# Patient Record
Sex: FEMALE | Race: WHITE | Employment: OTHER | URBAN - NONMETROPOLITAN AREA
[De-identification: names, ages, dates, MRNs, and addresses within clinical notes are randomized per-mention and may not be internally consistent; named-entity substitution may affect disease eponyms.]

---

## 2017-04-13 ENCOUNTER — OFFICE VISIT (OUTPATIENT)
Dept: FAMILY MEDICINE CLINIC | Age: 72
End: 2017-04-13
Payer: MEDICARE

## 2017-04-13 VITALS
WEIGHT: 154 LBS | BODY MASS INDEX: 27.29 KG/M2 | TEMPERATURE: 98.8 F | SYSTOLIC BLOOD PRESSURE: 128 MMHG | HEART RATE: 110 BPM | DIASTOLIC BLOOD PRESSURE: 84 MMHG | OXYGEN SATURATION: 96 % | HEIGHT: 63 IN

## 2017-04-13 DIAGNOSIS — K57.30 DIVERTICULOSIS OF LARGE INTESTINE WITHOUT HEMORRHAGE: ICD-10-CM

## 2017-04-13 DIAGNOSIS — I10 ESSENTIAL HYPERTENSION: Primary | ICD-10-CM

## 2017-04-13 DIAGNOSIS — C50.911 MALIGNANT NEOPLASM OF RIGHT FEMALE BREAST, UNSPECIFIED SITE OF BREAST: ICD-10-CM

## 2017-04-13 DIAGNOSIS — Z23 NEED FOR PNEUMOCOCCAL VACCINATION: ICD-10-CM

## 2017-04-13 DIAGNOSIS — R73.09 ELEVATED GLUCOSE: ICD-10-CM

## 2017-04-13 PROCEDURE — 99203 OFFICE O/P NEW LOW 30 MIN: CPT | Performed by: NURSE PRACTITIONER

## 2017-04-13 PROCEDURE — 3014F SCREEN MAMMO DOC REV: CPT | Performed by: NURSE PRACTITIONER

## 2017-04-13 PROCEDURE — 4040F PNEUMOC VAC/ADMIN/RCVD: CPT | Performed by: NURSE PRACTITIONER

## 2017-04-13 PROCEDURE — 1123F ACP DISCUSS/DSCN MKR DOCD: CPT | Performed by: NURSE PRACTITIONER

## 2017-04-13 PROCEDURE — 3017F COLORECTAL CA SCREEN DOC REV: CPT | Performed by: NURSE PRACTITIONER

## 2017-04-13 PROCEDURE — 1090F PRES/ABSN URINE INCON ASSESS: CPT | Performed by: NURSE PRACTITIONER

## 2017-04-13 PROCEDURE — G8427 DOCREV CUR MEDS BY ELIG CLIN: HCPCS | Performed by: NURSE PRACTITIONER

## 2017-04-13 PROCEDURE — G8420 CALC BMI NORM PARAMETERS: HCPCS | Performed by: NURSE PRACTITIONER

## 2017-04-13 PROCEDURE — G8399 PT W/DXA RESULTS DOCUMENT: HCPCS | Performed by: NURSE PRACTITIONER

## 2017-04-13 PROCEDURE — 1036F TOBACCO NON-USER: CPT | Performed by: NURSE PRACTITIONER

## 2017-04-13 RX ORDER — RALOXIFENE HYDROCHLORIDE 60 MG/1
60 TABLET, FILM COATED ORAL DAILY
COMMUNITY
End: 2017-04-13 | Stop reason: SDUPTHER

## 2017-04-13 RX ORDER — FUROSEMIDE 20 MG/1
20 TABLET ORAL DAILY
COMMUNITY
End: 2017-04-13 | Stop reason: SDUPTHER

## 2017-04-13 RX ORDER — KETOPROFEN 200 MG
200 CAPSULE, EXTENDED RELEASE PELLETS 24 HR ORAL PRN
COMMUNITY

## 2017-04-13 RX ORDER — LISINOPRIL 5 MG/1
5 TABLET ORAL 2 TIMES DAILY
Qty: 180 TABLET | Refills: 1 | Status: SHIPPED | OUTPATIENT
Start: 2017-04-13 | End: 2017-10-26 | Stop reason: SDUPTHER

## 2017-04-13 RX ORDER — FUROSEMIDE 20 MG/1
20 TABLET ORAL DAILY
Qty: 90 TABLET | Refills: 1 | Status: SHIPPED | OUTPATIENT
Start: 2017-04-13 | End: 2017-10-26 | Stop reason: SDUPTHER

## 2017-04-13 RX ORDER — LISINOPRIL 5 MG/1
5 TABLET ORAL 2 TIMES DAILY
COMMUNITY
End: 2017-04-13 | Stop reason: SDUPTHER

## 2017-04-13 RX ORDER — RALOXIFENE HYDROCHLORIDE 60 MG/1
60 TABLET, FILM COATED ORAL DAILY
Qty: 90 TABLET | Refills: 1 | Status: SHIPPED | OUTPATIENT
Start: 2017-04-13 | End: 2017-10-26 | Stop reason: SDUPTHER

## 2017-04-13 ASSESSMENT — ENCOUNTER SYMPTOMS
DIARRHEA: 0
ABDOMINAL PAIN: 0
SHORTNESS OF BREATH: 0
CONSTIPATION: 0
TROUBLE SWALLOWING: 0

## 2017-05-08 ENCOUNTER — HOSPITAL ENCOUNTER (OUTPATIENT)
Dept: WOMENS IMAGING | Age: 72
Discharge: HOME | End: 2017-05-08
Payer: MEDICARE

## 2017-05-08 DIAGNOSIS — Z12.31 VISIT FOR SCREENING MAMMOGRAM: ICD-10-CM

## 2017-05-08 PROCEDURE — 77063 BREAST TOMOSYNTHESIS BI: CPT

## 2017-05-12 ENCOUNTER — HOSPITAL ENCOUNTER (OUTPATIENT)
Dept: ULTRASOUND IMAGING | Age: 72
Discharge: HOME | End: 2017-05-12
Payer: MEDICARE

## 2017-05-12 ENCOUNTER — HOSPITAL ENCOUNTER (OUTPATIENT)
Dept: WOMENS IMAGING | Age: 72
End: 2017-05-12
Payer: MEDICARE

## 2017-05-12 DIAGNOSIS — Z85.3 PERSONAL HISTORY OF BREAST CANCER: ICD-10-CM

## 2017-05-12 DIAGNOSIS — C50.511 MALIGNANT NEOPLASM OF LOWER-OUTER QUADRANT OF RIGHT FEMALE BREAST (HCC): ICD-10-CM

## 2017-05-12 PROCEDURE — 76642 ULTRASOUND BREAST LIMITED: CPT

## 2017-06-01 ENCOUNTER — HOSPITAL ENCOUNTER (OUTPATIENT)
Dept: MRI IMAGING | Age: 72
Discharge: HOME | End: 2017-06-01
Payer: MEDICARE

## 2017-06-01 DIAGNOSIS — R93.89 ABNORMAL ULTRASOUND: ICD-10-CM

## 2017-06-01 LAB
GFR NON-AFRICAN AMERICAN: >60
PERFORMED ON: NORMAL
POC CREATININE: 0.6 MG/DL (ref 0.3–1.3)
POC SAMPLE TYPE: NORMAL

## 2017-06-01 PROCEDURE — 6360000004 HC RX CONTRAST MEDICATION: Performed by: INTERNAL MEDICINE

## 2017-06-01 PROCEDURE — C8908 MRI W/O FOL W/CONT, BREAST,: HCPCS

## 2017-06-01 PROCEDURE — 82565 ASSAY OF CREATININE: CPT

## 2017-06-01 PROCEDURE — A9579 GAD-BASE MR CONTRAST NOS,1ML: HCPCS | Performed by: INTERNAL MEDICINE

## 2017-06-01 RX ADMIN — GADOPENTETATE DIMEGLUMINE 20 ML: 469.01 INJECTION INTRAVENOUS at 15:02

## 2017-07-06 ENCOUNTER — HOSPITAL ENCOUNTER (OUTPATIENT)
Dept: ULTRASOUND IMAGING | Age: 72
Discharge: HOME | End: 2017-07-06
Payer: MEDICARE

## 2017-07-06 DIAGNOSIS — K76.89 OTHER CHRONIC NONALCOHOLIC LIVER DISEASE: ICD-10-CM

## 2017-07-06 DIAGNOSIS — R73.09 ELEVATED GLUCOSE: ICD-10-CM

## 2017-07-06 DIAGNOSIS — I10 ESSENTIAL HYPERTENSION: ICD-10-CM

## 2017-07-06 LAB
ALBUMIN SERPL-MCNC: 4.1 G/DL (ref 3.5–5.2)
ALP BLD-CCNC: 71 U/L (ref 35–104)
ALT SERPL-CCNC: 16 U/L (ref 5–33)
ANION GAP SERPL CALCULATED.3IONS-SCNC: 19 MMOL/L (ref 7–19)
AST SERPL-CCNC: 18 U/L (ref 5–32)
BACTERIA: NEGATIVE /HPF
BASOPHILS ABSOLUTE: 0.1 K/UL (ref 0–0.2)
BASOPHILS RELATIVE PERCENT: 1.1 % (ref 0–1)
BILIRUB SERPL-MCNC: 0.5 MG/DL (ref 0.2–1.2)
BILIRUBIN URINE: NEGATIVE
BLOOD, URINE: NEGATIVE
BUN BLDV-MCNC: 18 MG/DL (ref 8–23)
CALCIUM SERPL-MCNC: 9.3 MG/DL (ref 8.8–10.2)
CHLORIDE BLD-SCNC: 96 MMOL/L (ref 98–111)
CHOLESTEROL, TOTAL: 200 MG/DL (ref 160–199)
CLARITY: CLEAR
CO2: 24 MMOL/L (ref 22–29)
COLOR: YELLOW
CREAT SERPL-MCNC: 0.6 MG/DL (ref 0.5–0.9)
CREATININE URINE: 17.8 MG/DL (ref 4.2–622)
EOSINOPHILS ABSOLUTE: 0.2 K/UL (ref 0–0.6)
EOSINOPHILS RELATIVE PERCENT: 3.6 % (ref 0–5)
EPITHELIAL CELLS, UA: 1 /HPF (ref 0–5)
GFR NON-AFRICAN AMERICAN: >60
GLUCOSE BLD-MCNC: 324 MG/DL (ref 74–109)
GLUCOSE URINE: >=1000 MG/DL
HBA1C MFR BLD: 11.9 %
HCT VFR BLD CALC: 46.4 % (ref 37–47)
HDLC SERPL-MCNC: 60 MG/DL (ref 65–121)
HEMOGLOBIN: 15.4 G/DL (ref 12–16)
HYALINE CASTS: 1 /HPF (ref 0–8)
KETONES, URINE: NEGATIVE MG/DL
LDL CHOLESTEROL CALCULATED: 105 MG/DL
LEUKOCYTE ESTERASE, URINE: ABNORMAL
LYMPHOCYTES ABSOLUTE: 1.8 K/UL (ref 1.1–4.5)
LYMPHOCYTES RELATIVE PERCENT: 29.3 % (ref 20–40)
MCH RBC QN AUTO: 32.6 PG (ref 27–31)
MCHC RBC AUTO-ENTMCNC: 33.2 G/DL (ref 33–37)
MCV RBC AUTO: 98.1 FL (ref 81–99)
MICROALBUMIN UR-MCNC: <1.2 MG/DL (ref 0–19)
MICROALBUMIN/CREAT UR-RTO: NORMAL MG/G
MONOCYTES ABSOLUTE: 0.5 K/UL (ref 0–0.9)
MONOCYTES RELATIVE PERCENT: 7.4 % (ref 0–10)
NEUTROPHILS ABSOLUTE: 3.6 K/UL (ref 1.5–7.5)
NEUTROPHILS RELATIVE PERCENT: 58.4 % (ref 50–65)
NITRITE, URINE: NEGATIVE
PDW BLD-RTO: 12.2 % (ref 11.5–14.5)
PH UA: 5
PLATELET # BLD: 227 K/UL (ref 130–400)
PMV BLD AUTO: 9.7 FL (ref 9.4–12.3)
POTASSIUM SERPL-SCNC: 4.2 MMOL/L (ref 3.5–5)
PROTEIN UA: NEGATIVE MG/DL
RBC # BLD: 4.73 M/UL (ref 4.2–5.4)
RBC UA: 0 /HPF (ref 0–4)
SODIUM BLD-SCNC: 139 MMOL/L (ref 136–145)
SPECIFIC GRAVITY UA: 1.01
TOTAL PROTEIN: 8 G/DL (ref 6.6–8.7)
TRIGL SERPL-MCNC: 177 MG/DL (ref 150–199)
UROBILINOGEN, URINE: 0.2 E.U./DL
WBC # BLD: 6.1 K/UL (ref 4.8–10.8)
WBC UA: 6 /HPF (ref 0–5)

## 2017-07-06 PROCEDURE — 76705 ECHO EXAM OF ABDOMEN: CPT

## 2017-07-08 LAB — URINE CULTURE, ROUTINE: NORMAL

## 2017-07-10 ENCOUNTER — HOSPITAL ENCOUNTER (OUTPATIENT)
Dept: CT IMAGING | Age: 72
Discharge: HOME | End: 2017-07-10
Payer: MEDICARE

## 2017-07-10 DIAGNOSIS — R16.0 HYPODENSE MASS OF RIGHT LOBE OF LIVER: ICD-10-CM

## 2017-07-10 PROCEDURE — 74160 CT ABDOMEN W/CONTRAST: CPT

## 2017-07-10 PROCEDURE — 6360000004 HC RX CONTRAST MEDICATION: Performed by: INTERNAL MEDICINE

## 2017-07-10 RX ADMIN — IOVERSOL 90 ML: 741 INJECTION INTRA-ARTERIAL; INTRAVENOUS at 10:26

## 2017-07-11 ENCOUNTER — OFFICE VISIT (OUTPATIENT)
Dept: FAMILY MEDICINE CLINIC | Age: 72
End: 2017-07-11
Payer: MEDICARE

## 2017-07-11 VITALS
WEIGHT: 153 LBS | SYSTOLIC BLOOD PRESSURE: 120 MMHG | BODY MASS INDEX: 27.1 KG/M2 | TEMPERATURE: 98.7 F | HEART RATE: 100 BPM | DIASTOLIC BLOOD PRESSURE: 76 MMHG | OXYGEN SATURATION: 97 %

## 2017-07-11 DIAGNOSIS — E11.9 TYPE 2 DIABETES MELLITUS WITHOUT COMPLICATION, WITHOUT LONG-TERM CURRENT USE OF INSULIN (HCC): Primary | ICD-10-CM

## 2017-07-11 PROCEDURE — 3046F HEMOGLOBIN A1C LEVEL >9.0%: CPT | Performed by: NURSE PRACTITIONER

## 2017-07-11 PROCEDURE — 3017F COLORECTAL CA SCREEN DOC REV: CPT | Performed by: NURSE PRACTITIONER

## 2017-07-11 PROCEDURE — G8399 PT W/DXA RESULTS DOCUMENT: HCPCS | Performed by: NURSE PRACTITIONER

## 2017-07-11 PROCEDURE — 1090F PRES/ABSN URINE INCON ASSESS: CPT | Performed by: NURSE PRACTITIONER

## 2017-07-11 PROCEDURE — 3014F SCREEN MAMMO DOC REV: CPT | Performed by: NURSE PRACTITIONER

## 2017-07-11 PROCEDURE — 99213 OFFICE O/P EST LOW 20 MIN: CPT | Performed by: NURSE PRACTITIONER

## 2017-07-11 PROCEDURE — 1036F TOBACCO NON-USER: CPT | Performed by: NURSE PRACTITIONER

## 2017-07-11 PROCEDURE — G8419 CALC BMI OUT NRM PARAM NOF/U: HCPCS | Performed by: NURSE PRACTITIONER

## 2017-07-11 PROCEDURE — G8427 DOCREV CUR MEDS BY ELIG CLIN: HCPCS | Performed by: NURSE PRACTITIONER

## 2017-07-11 PROCEDURE — 1123F ACP DISCUSS/DSCN MKR DOCD: CPT | Performed by: NURSE PRACTITIONER

## 2017-07-11 PROCEDURE — 4040F PNEUMOC VAC/ADMIN/RCVD: CPT | Performed by: NURSE PRACTITIONER

## 2017-07-11 RX ORDER — LANCETS 30 GAUGE
EACH MISCELLANEOUS
Qty: 100 EACH | Refills: 5 | Status: SHIPPED | OUTPATIENT
Start: 2017-07-11

## 2017-07-11 RX ORDER — GLIMEPIRIDE 1 MG/1
1 TABLET ORAL
Qty: 30 TABLET | Refills: 3 | Status: SHIPPED | OUTPATIENT
Start: 2017-07-11 | End: 2017-10-26 | Stop reason: DRUGHIGH

## 2017-07-18 ENCOUNTER — TELEPHONE (OUTPATIENT)
Dept: FAMILY MEDICINE CLINIC | Age: 72
End: 2017-07-18

## 2017-10-26 ENCOUNTER — OFFICE VISIT (OUTPATIENT)
Dept: FAMILY MEDICINE CLINIC | Age: 72
End: 2017-10-26
Payer: MEDICARE

## 2017-10-26 VITALS
WEIGHT: 160 LBS | TEMPERATURE: 98 F | BODY MASS INDEX: 28.34 KG/M2 | OXYGEN SATURATION: 93 % | RESPIRATION RATE: 20 BRPM | HEART RATE: 111 BPM | DIASTOLIC BLOOD PRESSURE: 84 MMHG | SYSTOLIC BLOOD PRESSURE: 122 MMHG

## 2017-10-26 DIAGNOSIS — C50.911 MALIGNANT NEOPLASM OF RIGHT FEMALE BREAST, UNSPECIFIED ESTROGEN RECEPTOR STATUS, UNSPECIFIED SITE OF BREAST (HCC): Primary | ICD-10-CM

## 2017-10-26 DIAGNOSIS — I10 ESSENTIAL HYPERTENSION: ICD-10-CM

## 2017-10-26 DIAGNOSIS — E11.9 TYPE 2 DIABETES MELLITUS WITHOUT COMPLICATION, WITHOUT LONG-TERM CURRENT USE OF INSULIN (HCC): ICD-10-CM

## 2017-10-26 PROCEDURE — G8399 PT W/DXA RESULTS DOCUMENT: HCPCS | Performed by: NURSE PRACTITIONER

## 2017-10-26 PROCEDURE — G8484 FLU IMMUNIZE NO ADMIN: HCPCS | Performed by: NURSE PRACTITIONER

## 2017-10-26 PROCEDURE — 1090F PRES/ABSN URINE INCON ASSESS: CPT | Performed by: NURSE PRACTITIONER

## 2017-10-26 PROCEDURE — 99214 OFFICE O/P EST MOD 30 MIN: CPT | Performed by: NURSE PRACTITIONER

## 2017-10-26 PROCEDURE — G8427 DOCREV CUR MEDS BY ELIG CLIN: HCPCS | Performed by: NURSE PRACTITIONER

## 2017-10-26 PROCEDURE — 1123F ACP DISCUSS/DSCN MKR DOCD: CPT | Performed by: NURSE PRACTITIONER

## 2017-10-26 PROCEDURE — 1036F TOBACCO NON-USER: CPT | Performed by: NURSE PRACTITIONER

## 2017-10-26 PROCEDURE — 4040F PNEUMOC VAC/ADMIN/RCVD: CPT | Performed by: NURSE PRACTITIONER

## 2017-10-26 PROCEDURE — 3017F COLORECTAL CA SCREEN DOC REV: CPT | Performed by: NURSE PRACTITIONER

## 2017-10-26 PROCEDURE — 3046F HEMOGLOBIN A1C LEVEL >9.0%: CPT | Performed by: NURSE PRACTITIONER

## 2017-10-26 PROCEDURE — G8419 CALC BMI OUT NRM PARAM NOF/U: HCPCS | Performed by: NURSE PRACTITIONER

## 2017-10-26 PROCEDURE — 3014F SCREEN MAMMO DOC REV: CPT | Performed by: NURSE PRACTITIONER

## 2017-10-26 RX ORDER — RALOXIFENE HYDROCHLORIDE 60 MG/1
60 TABLET, FILM COATED ORAL DAILY
Qty: 90 TABLET | Refills: 1 | Status: SHIPPED | OUTPATIENT
Start: 2017-10-26 | End: 2018-04-16 | Stop reason: SDUPTHER

## 2017-10-26 RX ORDER — GLIMEPIRIDE 2 MG/1
2 TABLET ORAL
Qty: 30 TABLET | Refills: 5 | Status: SHIPPED | OUTPATIENT
Start: 2017-10-26 | End: 2017-10-26 | Stop reason: SINTOL

## 2017-10-26 RX ORDER — METFORMIN HYDROCHLORIDE 750 MG/1
750 TABLET, EXTENDED RELEASE ORAL
Qty: 30 TABLET | Refills: 5 | Status: SHIPPED | OUTPATIENT
Start: 2017-10-26 | End: 2018-04-19

## 2017-10-26 RX ORDER — LISINOPRIL 5 MG/1
5 TABLET ORAL 2 TIMES DAILY
Qty: 180 TABLET | Refills: 1 | Status: SHIPPED | OUTPATIENT
Start: 2017-10-26 | End: 2018-04-16 | Stop reason: SDUPTHER

## 2017-10-26 RX ORDER — FUROSEMIDE 20 MG/1
20 TABLET ORAL DAILY
Qty: 90 TABLET | Refills: 1 | Status: SHIPPED | OUTPATIENT
Start: 2017-10-26 | End: 2018-04-16 | Stop reason: SDUPTHER

## 2017-10-26 ASSESSMENT — ENCOUNTER SYMPTOMS
DIARRHEA: 1
TROUBLE SWALLOWING: 0
ABDOMINAL PAIN: 1
NAUSEA: 1
SHORTNESS OF BREATH: 0

## 2017-10-26 NOTE — PROGRESS NOTES
[address]                  [address]                 07/06/2017                . Chief Complaint   Patient presents with    Diabetes     3 month check up for diabetic , needs refills on prescriptions, Metformin is making her sick to her stomach          Review of Systems   Constitutional: Positive for diaphoresis. Negative for unexpected weight change. HENT: Negative for trouble swallowing. Respiratory: Negative for shortness of breath. Cardiovascular: Negative for chest pain. Gastrointestinal: Positive for abdominal pain, diarrhea and nausea. Musculoskeletal: Negative for arthralgias and myalgias. Past Medical History:   Diagnosis Date    Cancer [facility])     breast right breast    Hypertension     Osteoarthritis      Current Outpatient Prescriptions on File Prior to Visit   Medication Sig Dispense Refill    UNABLE TO FIND E11.9  glucomter Device  Insurance preference 1 Device 0    Lancets MISC 2 times a day 100 each 5    UNABLE TO FIND Test strips insurance preferred  2 times a day FBS and 2 hour after meal 100 strip 5    metFORMIN (GLUCOPHAGE) 500 MG tablet Take 1 tablet by mouth 2 times daily (with meals) 60 tablet 3    glimepiride (AMARYL) 1 MG tablet Take 1 tablet by mouth every morning (before breakfast) 30 tablet 3    aspirin 81 MG tablet Take 81 mg by mouth daily      Calcium Carbonate-Vitamin D (CALCIUM-VITAMIN D) 500-200 MG-UNIT per tablet Take 1 tablet by mouth 2 times daily (with meals)      Ketoprofen  MG CP24 Take 200 mg by mouth as needed      raloxifene (EVISTA) 60 MG tablet Take 1 tablet by mouth daily 90 tablet 1    lisinopril (PRINIVIL;ZESTRIL) 5 MG tablet Take 1 tablet by mouth 2 times daily 180 tablet 1    furosemide (LASIX) 20 MG tablet Take 1 tablet by mouth daily 90 tablet 1     No current facility-administered medications on file prior to visit.       No Known Allergies    Objective:   Physical Exam   Constitutional: She is oriented to person, place, and time. She appears well-developed and well-nourished. HENT:   Head: Normocephalic and atraumatic. Right Ear: External ear normal.   Left Ear: External ear normal.   Eyes: Conjunctivae and lids are normal.   Neck: Normal range of motion. Cardiovascular: Normal rate, regular rhythm and normal heart sounds. Pulmonary/Chest: Effort normal and breath sounds normal.   Abdominal: Normal appearance. Neurological: She is alert and oriented to person, place, and time. Skin: Skin is warm and dry. Psychiatric: She has a normal mood and affect. Her behavior is normal.     /84 (Site: Left Arm, Position: Sitting, Cuff Size: Large Adult)   Pulse 111   Temp 98 °F (36.7 °C) (Oral)   Resp 20   Wt 160 lb (72.6 kg)   SpO2 93%   BMI 28.34 kg/m²     Assessment:      1. Malignant neoplasm of right female breast, unspecified estrogen receptor status, unspecified site of breast ([facility]  raloxifene (EVISTA) 60 MG tablet   2. Type 2 diabetes mellitus without complication, without long-term current use of insulin (HCC)  metFORMIN (GLUCOPHAGE-XR) 750 MG extended release tablet    linagliptin (TRADJENTA) 5 MG tablet    Hemoglobin A1C    DISCONTINUED: glimepiride (AMARYL) 2 MG tablet   3. Essential hypertension  lisinopril (PRINIVIL;ZESTRIL) 5 MG tablet    furosemide (LASIX) 20 MG tablet    Lipid Panel           Plan:      Change med's due to side effects. Of Metformin made ER and Amaryl to [facility]   Review labs.

## 2017-11-09 DIAGNOSIS — E11.9 TYPE 2 DIABETES MELLITUS WITHOUT COMPLICATION, WITHOUT LONG-TERM CURRENT USE OF INSULIN (HCC): ICD-10-CM

## 2017-11-09 DIAGNOSIS — I10 ESSENTIAL HYPERTENSION: ICD-10-CM

## 2017-11-09 LAB
CHOLESTEROL, TOTAL: 173 MG/DL (ref 160–199)
HBA1C MFR BLD: 7.3 %
HDLC SERPL-MCNC: 61 MG/DL (ref 65–121)
LDL CHOLESTEROL CALCULATED: 97 MG/DL
TRIGL SERPL-MCNC: 75 MG/DL (ref 0–149)

## 2018-04-16 ENCOUNTER — OFFICE VISIT (OUTPATIENT)
Dept: FAMILY MEDICINE CLINIC | Age: 73
End: 2018-04-16
Payer: MEDICARE

## 2018-04-16 VITALS
RESPIRATION RATE: 20 BRPM | DIASTOLIC BLOOD PRESSURE: 82 MMHG | SYSTOLIC BLOOD PRESSURE: 124 MMHG | HEIGHT: 63 IN | BODY MASS INDEX: 28.17 KG/M2 | HEART RATE: 98 BPM | TEMPERATURE: 98 F | WEIGHT: 159 LBS | OXYGEN SATURATION: 98 %

## 2018-04-16 DIAGNOSIS — E11.9 TYPE 2 DIABETES MELLITUS WITHOUT COMPLICATION, WITHOUT LONG-TERM CURRENT USE OF INSULIN (HCC): Primary | ICD-10-CM

## 2018-04-16 DIAGNOSIS — B35.1 ONYCHOMYCOSIS: ICD-10-CM

## 2018-04-16 DIAGNOSIS — C50.911 MALIGNANT NEOPLASM OF RIGHT FEMALE BREAST, UNSPECIFIED ESTROGEN RECEPTOR STATUS, UNSPECIFIED SITE OF BREAST (HCC): ICD-10-CM

## 2018-04-16 DIAGNOSIS — I10 ESSENTIAL HYPERTENSION: ICD-10-CM

## 2018-04-16 DIAGNOSIS — L03.011 ONYCHIA OF FINGER OF RIGHT HAND: ICD-10-CM

## 2018-04-16 PROCEDURE — 3017F COLORECTAL CA SCREEN DOC REV: CPT | Performed by: NURSE PRACTITIONER

## 2018-04-16 PROCEDURE — 3014F SCREEN MAMMO DOC REV: CPT | Performed by: NURSE PRACTITIONER

## 2018-04-16 PROCEDURE — 3046F HEMOGLOBIN A1C LEVEL >9.0%: CPT | Performed by: NURSE PRACTITIONER

## 2018-04-16 PROCEDURE — 1090F PRES/ABSN URINE INCON ASSESS: CPT | Performed by: NURSE PRACTITIONER

## 2018-04-16 PROCEDURE — 4040F PNEUMOC VAC/ADMIN/RCVD: CPT | Performed by: NURSE PRACTITIONER

## 2018-04-16 PROCEDURE — 1036F TOBACCO NON-USER: CPT | Performed by: NURSE PRACTITIONER

## 2018-04-16 PROCEDURE — 1123F ACP DISCUSS/DSCN MKR DOCD: CPT | Performed by: NURSE PRACTITIONER

## 2018-04-16 PROCEDURE — 99214 OFFICE O/P EST MOD 30 MIN: CPT | Performed by: NURSE PRACTITIONER

## 2018-04-16 PROCEDURE — G8399 PT W/DXA RESULTS DOCUMENT: HCPCS | Performed by: NURSE PRACTITIONER

## 2018-04-16 PROCEDURE — G8419 CALC BMI OUT NRM PARAM NOF/U: HCPCS | Performed by: NURSE PRACTITIONER

## 2018-04-16 PROCEDURE — 2022F DILAT RTA XM EVC RTNOPTHY: CPT | Performed by: NURSE PRACTITIONER

## 2018-04-16 PROCEDURE — G8427 DOCREV CUR MEDS BY ELIG CLIN: HCPCS | Performed by: NURSE PRACTITIONER

## 2018-04-16 RX ORDER — TERBINAFINE HYDROCHLORIDE 250 MG/1
250 TABLET ORAL DAILY
Qty: 30 TABLET | Refills: 1 | Status: SHIPPED | OUTPATIENT
Start: 2018-04-16 | End: 2018-05-28

## 2018-04-16 RX ORDER — RALOXIFENE HYDROCHLORIDE 60 MG/1
60 TABLET, FILM COATED ORAL DAILY
Qty: 90 TABLET | Refills: 1 | Status: SHIPPED | OUTPATIENT
Start: 2018-04-16 | End: 2018-10-25 | Stop reason: SDUPTHER

## 2018-04-16 RX ORDER — FUROSEMIDE 20 MG/1
20 TABLET ORAL DAILY
Qty: 90 TABLET | Refills: 1 | Status: SHIPPED | OUTPATIENT
Start: 2018-04-16 | End: 2018-10-25 | Stop reason: SDUPTHER

## 2018-04-16 RX ORDER — LISINOPRIL 5 MG/1
5 TABLET ORAL 2 TIMES DAILY
Qty: 180 TABLET | Refills: 1 | Status: SHIPPED | OUTPATIENT
Start: 2018-04-16 | End: 2018-10-25 | Stop reason: SDUPTHER

## 2018-04-16 ASSESSMENT — PATIENT HEALTH QUESTIONNAIRE - PHQ9
1. LITTLE INTEREST OR PLEASURE IN DOING THINGS: 0
SUM OF ALL RESPONSES TO PHQ QUESTIONS 1-9: 0
SUM OF ALL RESPONSES TO PHQ9 QUESTIONS 1 & 2: 0
2. FEELING DOWN, DEPRESSED OR HOPELESS: 0

## 2018-04-17 PROBLEM — E11.9 TYPE 2 DIABETES MELLITUS WITHOUT COMPLICATION, WITHOUT LONG-TERM CURRENT USE OF INSULIN (HCC): Status: ACTIVE | Noted: 2018-04-17

## 2018-04-18 DIAGNOSIS — I10 ESSENTIAL HYPERTENSION: ICD-10-CM

## 2018-04-18 DIAGNOSIS — E11.9 TYPE 2 DIABETES MELLITUS WITHOUT COMPLICATION, WITHOUT LONG-TERM CURRENT USE OF INSULIN (HCC): ICD-10-CM

## 2018-04-18 LAB
ALBUMIN SERPL-MCNC: 4 G/DL (ref 3.5–5.2)
ALP BLD-CCNC: 62 U/L (ref 35–104)
ALT SERPL-CCNC: 14 U/L (ref 5–33)
ANION GAP SERPL CALCULATED.3IONS-SCNC: 17 MMOL/L (ref 7–19)
AST SERPL-CCNC: 17 U/L (ref 5–32)
BASOPHILS ABSOLUTE: 0.1 K/UL (ref 0–0.2)
BASOPHILS RELATIVE PERCENT: 0.7 % (ref 0–1)
BILIRUB SERPL-MCNC: 0.3 MG/DL (ref 0.2–1.2)
BUN BLDV-MCNC: 15 MG/DL (ref 8–23)
CALCIUM SERPL-MCNC: 9.7 MG/DL (ref 8.8–10.2)
CHLORIDE BLD-SCNC: 101 MMOL/L (ref 98–111)
CHOLESTEROL, TOTAL: 160 MG/DL (ref 160–199)
CO2: 23 MMOL/L (ref 22–29)
CREAT SERPL-MCNC: 0.5 MG/DL (ref 0.5–0.9)
CREATININE URINE: 21.4 MG/DL (ref 4.2–622)
EOSINOPHILS ABSOLUTE: 0.1 K/UL (ref 0–0.6)
EOSINOPHILS RELATIVE PERCENT: 1.7 % (ref 0–5)
GFR NON-AFRICAN AMERICAN: >60
GLUCOSE BLD-MCNC: 184 MG/DL (ref 74–109)
HBA1C MFR BLD: 8.7 %
HCT VFR BLD CALC: 44.7 % (ref 37–47)
HDLC SERPL-MCNC: 54 MG/DL (ref 65–121)
HEMOGLOBIN: 14.2 G/DL (ref 12–16)
LDL CHOLESTEROL CALCULATED: 83 MG/DL
LYMPHOCYTES ABSOLUTE: 1.7 K/UL (ref 1.1–4.5)
LYMPHOCYTES RELATIVE PERCENT: 25.3 % (ref 20–40)
MCH RBC QN AUTO: 31.8 PG (ref 27–31)
MCHC RBC AUTO-ENTMCNC: 31.8 G/DL (ref 33–37)
MCV RBC AUTO: 100.2 FL (ref 81–99)
MICROALBUMIN UR-MCNC: <1.2 MG/DL (ref 0–19)
MICROALBUMIN/CREAT UR-RTO: NORMAL MG/G
MONOCYTES ABSOLUTE: 0.6 K/UL (ref 0–0.9)
MONOCYTES RELATIVE PERCENT: 8 % (ref 0–10)
NEUTROPHILS ABSOLUTE: 4.4 K/UL (ref 1.5–7.5)
NEUTROPHILS RELATIVE PERCENT: 64 % (ref 50–65)
PDW BLD-RTO: 12.1 % (ref 11.5–14.5)
PLATELET # BLD: 201 K/UL (ref 130–400)
PMV BLD AUTO: 9.2 FL (ref 9.4–12.3)
POTASSIUM SERPL-SCNC: 4.3 MMOL/L (ref 3.5–5)
RBC # BLD: 4.46 M/UL (ref 4.2–5.4)
SODIUM BLD-SCNC: 141 MMOL/L (ref 136–145)
TOTAL PROTEIN: 7.2 G/DL (ref 6.6–8.7)
TRIGL SERPL-MCNC: 115 MG/DL (ref 0–149)
WBC # BLD: 6.9 K/UL (ref 4.8–10.8)

## 2018-05-09 ENCOUNTER — HOSPITAL ENCOUNTER (OUTPATIENT)
Dept: ULTRASOUND IMAGING | Age: 73
Discharge: HOME | End: 2018-05-09
Payer: MEDICARE

## 2018-05-09 ENCOUNTER — HOSPITAL ENCOUNTER (OUTPATIENT)
Dept: WOMENS IMAGING | Age: 73
Discharge: HOME | End: 2018-05-09
Payer: MEDICARE

## 2018-05-09 DIAGNOSIS — K76.89 OTHER SPECIFIED DISEASES OF LIVER (CODE): ICD-10-CM

## 2018-05-09 DIAGNOSIS — Z12.39 BREAST CANCER SCREENING, HIGH RISK PATIENT: ICD-10-CM

## 2018-05-09 DIAGNOSIS — C50.511 MALIGNANT NEOPLASM OF LOWER-OUTER QUADRANT OF RIGHT FEMALE BREAST, UNSPECIFIED ESTROGEN RECEPTOR STATUS (HCC): ICD-10-CM

## 2018-05-09 PROCEDURE — 77067 SCR MAMMO BI INCL CAD: CPT

## 2018-05-09 PROCEDURE — 76705 ECHO EXAM OF ABDOMEN: CPT

## 2018-05-21 ENCOUNTER — HOSPITAL ENCOUNTER (OUTPATIENT)
Dept: ULTRASOUND IMAGING | Age: 73
Discharge: HOME | End: 2018-05-21
Payer: MEDICARE

## 2018-05-21 ENCOUNTER — HOSPITAL ENCOUNTER (OUTPATIENT)
Dept: WOMENS IMAGING | Age: 73
Discharge: HOME | End: 2018-05-21
Payer: MEDICARE

## 2018-05-21 DIAGNOSIS — R92.8 ABNORMAL MAMMOGRAM: ICD-10-CM

## 2018-05-21 PROCEDURE — G0279 TOMOSYNTHESIS, MAMMO: HCPCS

## 2018-05-21 PROCEDURE — 76642 ULTRASOUND BREAST LIMITED: CPT

## 2018-05-24 ENCOUNTER — HOSPITAL ENCOUNTER (OUTPATIENT)
Dept: WOMENS IMAGING | Age: 73
Discharge: HOME | End: 2018-05-24
Payer: MEDICARE

## 2018-05-24 DIAGNOSIS — N63.23 MASS OF LOWER OUTER QUADRANT OF LEFT BREAST: ICD-10-CM

## 2018-05-24 DIAGNOSIS — Z85.3 PERSONAL HISTORY OF MALIGNANT NEOPLASM OF BREAST: ICD-10-CM

## 2018-05-24 PROCEDURE — 19083 BX BREAST 1ST LESION US IMAG: CPT

## 2018-05-24 PROCEDURE — 2720000001 MAMMOGRAM POST BX CLIP PLACEMENT LEFT

## 2018-05-24 PROCEDURE — 88305 TISSUE EXAM BY PATHOLOGIST: CPT

## 2018-06-04 ENCOUNTER — TELEPHONE (OUTPATIENT)
Dept: WOMENS IMAGING | Age: 73
End: 2018-06-04

## 2018-08-20 DIAGNOSIS — E11.9 TYPE 2 DIABETES MELLITUS WITHOUT COMPLICATION, WITHOUT LONG-TERM CURRENT USE OF INSULIN (HCC): Primary | ICD-10-CM

## 2018-10-25 ENCOUNTER — OFFICE VISIT (OUTPATIENT)
Dept: FAMILY MEDICINE CLINIC | Age: 73
End: 2018-10-25
Payer: MEDICARE

## 2018-10-25 VITALS
RESPIRATION RATE: 20 BRPM | SYSTOLIC BLOOD PRESSURE: 136 MMHG | WEIGHT: 148 LBS | OXYGEN SATURATION: 96 % | DIASTOLIC BLOOD PRESSURE: 84 MMHG | HEART RATE: 104 BPM | TEMPERATURE: 98.2 F | BODY MASS INDEX: 26.22 KG/M2

## 2018-10-25 DIAGNOSIS — Z23 NEED FOR SHINGLES VACCINE: ICD-10-CM

## 2018-10-25 DIAGNOSIS — Z23 NEEDS FLU SHOT: Primary | ICD-10-CM

## 2018-10-25 DIAGNOSIS — R59.1 LYMPHADENOPATHY: ICD-10-CM

## 2018-10-25 DIAGNOSIS — I10 ESSENTIAL HYPERTENSION: ICD-10-CM

## 2018-10-25 DIAGNOSIS — E11.9 TYPE 2 DIABETES MELLITUS WITHOUT COMPLICATION, WITHOUT LONG-TERM CURRENT USE OF INSULIN (HCC): ICD-10-CM

## 2018-10-25 DIAGNOSIS — Z78.0 MENOPAUSE: ICD-10-CM

## 2018-10-25 DIAGNOSIS — Z23 NEED FOR VACCINATION FOR PNEUMOCOCCUS: ICD-10-CM

## 2018-10-25 DIAGNOSIS — C50.911 MALIGNANT NEOPLASM OF RIGHT FEMALE BREAST, UNSPECIFIED ESTROGEN RECEPTOR STATUS, UNSPECIFIED SITE OF BREAST (HCC): ICD-10-CM

## 2018-10-25 LAB
ALBUMIN SERPL-MCNC: 3.9 G/DL (ref 3.5–5.2)
ALP BLD-CCNC: 56 U/L (ref 35–104)
ALT SERPL-CCNC: 13 U/L (ref 5–33)
ANION GAP SERPL CALCULATED.3IONS-SCNC: 18 MMOL/L (ref 7–19)
AST SERPL-CCNC: 17 U/L (ref 5–32)
BASOPHILS ABSOLUTE: 0.1 K/UL (ref 0–0.2)
BASOPHILS RELATIVE PERCENT: 1 % (ref 0–1)
BILIRUB SERPL-MCNC: 0.3 MG/DL (ref 0.2–1.2)
BUN BLDV-MCNC: 18 MG/DL (ref 8–23)
CALCIUM SERPL-MCNC: 9.3 MG/DL (ref 8.8–10.2)
CHLORIDE BLD-SCNC: 99 MMOL/L (ref 98–111)
CHOLESTEROL, TOTAL: 174 MG/DL (ref 160–199)
CO2: 24 MMOL/L (ref 22–29)
CREAT SERPL-MCNC: 0.6 MG/DL (ref 0.5–0.9)
CREATININE URINE: 90.9 MG/DL (ref 4.2–622)
EOSINOPHILS ABSOLUTE: 0.2 K/UL (ref 0–0.6)
EOSINOPHILS RELATIVE PERCENT: 2.8 % (ref 0–5)
GFR NON-AFRICAN AMERICAN: >60
GLUCOSE BLD-MCNC: 143 MG/DL (ref 74–109)
HBA1C MFR BLD: 6.4 % (ref 4–6)
HCT VFR BLD CALC: 45.3 % (ref 37–47)
HDLC SERPL-MCNC: 63 MG/DL (ref 65–121)
HEMOGLOBIN: 14.1 G/DL (ref 12–16)
LDL CHOLESTEROL CALCULATED: 86 MG/DL
LYMPHOCYTES ABSOLUTE: 1.6 K/UL (ref 1.1–4.5)
LYMPHOCYTES RELATIVE PERCENT: 27 % (ref 20–40)
MCH RBC QN AUTO: 31.6 PG (ref 27–31)
MCHC RBC AUTO-ENTMCNC: 31.1 G/DL (ref 33–37)
MCV RBC AUTO: 101.6 FL (ref 81–99)
MICROALBUMIN UR-MCNC: <1.2 MG/DL (ref 0–19)
MICROALBUMIN/CREAT UR-RTO: NORMAL MG/G
MONOCYTES ABSOLUTE: 0.5 K/UL (ref 0–0.9)
MONOCYTES RELATIVE PERCENT: 8.6 % (ref 0–10)
NEUTROPHILS ABSOLUTE: 3.7 K/UL (ref 1.5–7.5)
NEUTROPHILS RELATIVE PERCENT: 60.1 % (ref 50–65)
PDW BLD-RTO: 12.4 % (ref 11.5–14.5)
PLATELET # BLD: 218 K/UL (ref 130–400)
PMV BLD AUTO: 9.3 FL (ref 9.4–12.3)
POTASSIUM SERPL-SCNC: 4.4 MMOL/L (ref 3.5–5)
RBC # BLD: 4.46 M/UL (ref 4.2–5.4)
SODIUM BLD-SCNC: 141 MMOL/L (ref 136–145)
TOTAL PROTEIN: 7.3 G/DL (ref 6.6–8.7)
TRIGL SERPL-MCNC: 126 MG/DL (ref 0–149)
TSH SERPL DL<=0.05 MIU/L-ACNC: 2.16 UIU/ML (ref 0.27–4.2)
WBC # BLD: 6.1 K/UL (ref 4.8–10.8)

## 2018-10-25 PROCEDURE — 1101F PT FALLS ASSESS-DOCD LE1/YR: CPT | Performed by: NURSE PRACTITIONER

## 2018-10-25 PROCEDURE — 1036F TOBACCO NON-USER: CPT | Performed by: NURSE PRACTITIONER

## 2018-10-25 PROCEDURE — G8427 DOCREV CUR MEDS BY ELIG CLIN: HCPCS | Performed by: NURSE PRACTITIONER

## 2018-10-25 PROCEDURE — G8482 FLU IMMUNIZE ORDER/ADMIN: HCPCS | Performed by: NURSE PRACTITIONER

## 2018-10-25 PROCEDURE — 2022F DILAT RTA XM EVC RTNOPTHY: CPT | Performed by: NURSE PRACTITIONER

## 2018-10-25 PROCEDURE — 3017F COLORECTAL CA SCREEN DOC REV: CPT | Performed by: NURSE PRACTITIONER

## 2018-10-25 PROCEDURE — 3045F PR MOST RECENT HEMOGLOBIN A1C LEVEL 7.0-9.0%: CPT | Performed by: NURSE PRACTITIONER

## 2018-10-25 PROCEDURE — 90662 IIV NO PRSV INCREASED AG IM: CPT | Performed by: NURSE PRACTITIONER

## 2018-10-25 PROCEDURE — G0009 ADMIN PNEUMOCOCCAL VACCINE: HCPCS | Performed by: NURSE PRACTITIONER

## 2018-10-25 PROCEDURE — 90670 PCV13 VACCINE IM: CPT | Performed by: NURSE PRACTITIONER

## 2018-10-25 PROCEDURE — 99214 OFFICE O/P EST MOD 30 MIN: CPT | Performed by: NURSE PRACTITIONER

## 2018-10-25 PROCEDURE — 1123F ACP DISCUSS/DSCN MKR DOCD: CPT | Performed by: NURSE PRACTITIONER

## 2018-10-25 PROCEDURE — G8419 CALC BMI OUT NRM PARAM NOF/U: HCPCS | Performed by: NURSE PRACTITIONER

## 2018-10-25 PROCEDURE — G8399 PT W/DXA RESULTS DOCUMENT: HCPCS | Performed by: NURSE PRACTITIONER

## 2018-10-25 PROCEDURE — G0008 ADMIN INFLUENZA VIRUS VAC: HCPCS | Performed by: NURSE PRACTITIONER

## 2018-10-25 PROCEDURE — 4040F PNEUMOC VAC/ADMIN/RCVD: CPT | Performed by: NURSE PRACTITIONER

## 2018-10-25 PROCEDURE — 1090F PRES/ABSN URINE INCON ASSESS: CPT | Performed by: NURSE PRACTITIONER

## 2018-10-25 RX ORDER — LISINOPRIL 5 MG/1
5 TABLET ORAL 2 TIMES DAILY
Qty: 180 TABLET | Refills: 1 | Status: SHIPPED | OUTPATIENT
Start: 2018-10-25 | End: 2019-05-16 | Stop reason: SDUPTHER

## 2018-10-25 RX ORDER — RALOXIFENE HYDROCHLORIDE 60 MG/1
60 TABLET, FILM COATED ORAL DAILY
Qty: 90 TABLET | Refills: 1 | Status: SHIPPED | OUTPATIENT
Start: 2018-10-25 | End: 2019-06-06 | Stop reason: SDUPTHER

## 2018-10-25 RX ORDER — SULFAMETHOXAZOLE AND TRIMETHOPRIM 800; 160 MG/1; MG/1
1 TABLET ORAL 2 TIMES DAILY
Qty: 20 TABLET | Refills: 0 | Status: SHIPPED | OUTPATIENT
Start: 2018-10-25 | End: 2018-11-04

## 2018-10-25 RX ORDER — FUROSEMIDE 20 MG/1
20 TABLET ORAL DAILY
Qty: 90 TABLET | Refills: 1 | Status: SHIPPED | OUTPATIENT
Start: 2018-10-25 | End: 2019-05-16 | Stop reason: SDUPTHER

## 2018-10-25 NOTE — PROGRESS NOTES
Prior to Visit Medications    Medication Sig Taking? Authorizing Provider   blood glucose test strips (ONE TOUCH ULTRA TEST) strip USE TO TEST BLOOD SUGAR TWICE DAILY E11.9 Yes TREVON Izquierdo   linagliptin (TRADJENTA) 5 MG tablet Take 1 tablet by mouth daily Yes TREVON Izquierdo   raloxifene (EVISTA) 60 MG tablet Take 1 tablet by mouth daily Yes TREVON Izquierdo   lisinopril (PRINIVIL;ZESTRIL) 5 MG tablet Take 1 tablet by mouth 2 times daily Yes TREVON Izquierdo   furosemide (LASIX) 20 MG tablet Take 1 tablet by mouth daily Yes TREVON Izquierdo   metFORMIN (GLUCOPHAGE) 1000 MG tablet Take 1 tablet by mouth 2 times daily (with meals) Yes TREVON Izquierdo   zoster recombinant adjuvanted vaccine [facility]) 50 MCG/0.5ML SUSR injection Inject 0.5 mLs into the muscle once for 1 dose Yes TREVON Izquierdo   sulfamethoxazole-trimethoprim (BACTRIM DS) 800-160 MG per tablet Take 1 tablet by mouth 2 times daily for 10 days Yes TREVON Izquierdo   UNABLE TO FIND Test strips insurance preferred  2 times a day FBS and 2 hour after meal  E11.9 Yes TREVON Izquierdo   UNABLE TO FIND E11.9  glucomter Device  Insurance preference Yes TREVON Izquierdo   Lancets MISC 2 times a day Yes TREVON Izquierdo   aspirin 81 MG tablet Take 81 mg by mouth daily Yes Historical Provider, MD   Calcium Carbonate-Vitamin D (CALCIUM-VITAMIN D) 500-200 MG-UNIT per tablet Take 1 tablet by mouth 2 times daily (with meals) Yes Historical Provider, MD   Ketoprofen  MG CP24 Take 200 mg by mouth as needed Yes Historical Provider, MD     No Known Allergies    Review of Systems   Constitutional: Negative for unexpected weight change. OBJECTIVE:    Physical Exam   Constitutional: She is oriented to person, place, and time. Vital signs are normal. She appears well-developed and well-nourished. She is active. HENT:   Head: Normocephalic and atraumatic.    Eyes: Pupils are equal, round, injection   7. Lymphadenopathy R59.1 sulfamethoxazole-trimethoprim (BACTRIM DS) 800-160 MG per tablet   8. Menopause Z78.0 DEXA Bone Density Axial Skeleton       PLAN:    [address]: Diabetes (6 month check up and refills and flu shot ) and Hypertension (refills )    RTC in 6 months  Review labs  Diagnosis and orders for this visit are above. Patient given educationalmaterials - see patient instructions. Discussed use, benefit, and side effects of prescribed medications. All patient questions answered. Pt voiced understanding. Reviewed health maintenance. Instructed to continue currentmedications, diet and exercise. Patient agreed with treatment plan. Follow up as directed.

## 2018-11-26 ENCOUNTER — HOSPITAL ENCOUNTER (OUTPATIENT)
Dept: WOMENS IMAGING | Age: 73
Discharge: HOME | End: 2018-11-26
Payer: MEDICARE

## 2018-11-26 DIAGNOSIS — Z78.0 MENOPAUSE: ICD-10-CM

## 2018-11-26 PROCEDURE — 77080 DXA BONE DENSITY AXIAL: CPT

## 2018-12-06 ENCOUNTER — HOSPITAL ENCOUNTER (OUTPATIENT)
Dept: WOMENS IMAGING | Age: 73
Discharge: HOME | End: 2018-12-06
Payer: MEDICARE

## 2018-12-06 ENCOUNTER — HOSPITAL ENCOUNTER (OUTPATIENT)
Dept: NON INVASIVE DIAGNOSTICS | Age: 73
Discharge: HOME | End: 2018-12-06
Payer: MEDICARE

## 2018-12-06 DIAGNOSIS — N63.0 LUMP OR MASS IN BREAST: ICD-10-CM

## 2018-12-06 LAB
LV EF: 58 %
LVEF MODALITY: NORMAL

## 2018-12-06 PROCEDURE — 93306 TTE W/DOPPLER COMPLETE: CPT

## 2018-12-06 PROCEDURE — G0279 TOMOSYNTHESIS, MAMMO: HCPCS

## 2019-05-06 ENCOUNTER — HOSPITAL ENCOUNTER (OUTPATIENT)
Dept: ULTRASOUND IMAGING | Age: 74
Discharge: HOME | End: 2019-05-06
Payer: MEDICARE

## 2019-05-06 DIAGNOSIS — K76.89 OTHER SPECIFIED DISEASES OF LIVER: ICD-10-CM

## 2019-05-06 PROCEDURE — 76705 ECHO EXAM OF ABDOMEN: CPT

## 2019-05-10 ENCOUNTER — HOSPITAL ENCOUNTER (OUTPATIENT)
Dept: WOMENS IMAGING | Age: 74
Discharge: HOME | End: 2019-05-10
Payer: MEDICARE

## 2019-05-10 DIAGNOSIS — Z12.39 BREAST CANCER SCREENING: ICD-10-CM

## 2019-05-10 PROCEDURE — 77067 SCR MAMMO BI INCL CAD: CPT

## 2019-05-15 LAB — HBA1C MFR BLD: 7.2 % (ref 4–6)

## 2019-05-16 ENCOUNTER — OFFICE VISIT (OUTPATIENT)
Dept: FAMILY MEDICINE CLINIC | Age: 74
End: 2019-05-16
Payer: MEDICARE

## 2019-05-16 VITALS
WEIGHT: 156 LBS | OXYGEN SATURATION: 95 % | RESPIRATION RATE: 20 BRPM | TEMPERATURE: 98 F | BODY MASS INDEX: 27.64 KG/M2 | DIASTOLIC BLOOD PRESSURE: 88 MMHG | HEART RATE: 109 BPM | SYSTOLIC BLOOD PRESSURE: 134 MMHG | HEIGHT: 63 IN

## 2019-05-16 DIAGNOSIS — E11.9 TYPE 2 DIABETES MELLITUS WITHOUT COMPLICATION, WITHOUT LONG-TERM CURRENT USE OF INSULIN (HCC): ICD-10-CM

## 2019-05-16 DIAGNOSIS — I10 ESSENTIAL HYPERTENSION: ICD-10-CM

## 2019-05-16 PROCEDURE — 4040F PNEUMOC VAC/ADMIN/RCVD: CPT | Performed by: NURSE PRACTITIONER

## 2019-05-16 PROCEDURE — 1090F PRES/ABSN URINE INCON ASSESS: CPT | Performed by: NURSE PRACTITIONER

## 2019-05-16 PROCEDURE — 1123F ACP DISCUSS/DSCN MKR DOCD: CPT | Performed by: NURSE PRACTITIONER

## 2019-05-16 PROCEDURE — G8419 CALC BMI OUT NRM PARAM NOF/U: HCPCS | Performed by: NURSE PRACTITIONER

## 2019-05-16 PROCEDURE — 3017F COLORECTAL CA SCREEN DOC REV: CPT | Performed by: NURSE PRACTITIONER

## 2019-05-16 PROCEDURE — 2022F DILAT RTA XM EVC RTNOPTHY: CPT | Performed by: NURSE PRACTITIONER

## 2019-05-16 PROCEDURE — 3045F PR MOST RECENT HEMOGLOBIN A1C LEVEL 7.0-9.0%: CPT | Performed by: NURSE PRACTITIONER

## 2019-05-16 PROCEDURE — G8427 DOCREV CUR MEDS BY ELIG CLIN: HCPCS | Performed by: NURSE PRACTITIONER

## 2019-05-16 PROCEDURE — 1036F TOBACCO NON-USER: CPT | Performed by: NURSE PRACTITIONER

## 2019-05-16 PROCEDURE — 99214 OFFICE O/P EST MOD 30 MIN: CPT | Performed by: NURSE PRACTITIONER

## 2019-05-16 PROCEDURE — G8399 PT W/DXA RESULTS DOCUMENT: HCPCS | Performed by: NURSE PRACTITIONER

## 2019-05-16 RX ORDER — LISINOPRIL 5 MG/1
5 TABLET ORAL 2 TIMES DAILY
Qty: 180 TABLET | Refills: 1 | Status: SHIPPED | OUTPATIENT
Start: 2019-05-16 | End: 2019-11-26 | Stop reason: SDUPTHER

## 2019-05-16 RX ORDER — FUROSEMIDE 20 MG/1
20 TABLET ORAL DAILY
Qty: 90 TABLET | Refills: 1 | Status: SHIPPED | OUTPATIENT
Start: 2019-05-16 | End: 2019-11-26 | Stop reason: SDUPTHER

## 2019-05-16 RX ORDER — GLIMEPIRIDE 2 MG/1
2 TABLET ORAL
Qty: 90 TABLET | Refills: 1 | Status: SHIPPED | OUTPATIENT
Start: 2019-05-16 | End: 2019-11-26 | Stop reason: SDUPTHER

## 2019-05-16 ASSESSMENT — PATIENT HEALTH QUESTIONNAIRE - PHQ9
SUM OF ALL RESPONSES TO PHQ9 QUESTIONS 1 & 2: 0
1. LITTLE INTEREST OR PLEASURE IN DOING THINGS: 0
SUM OF ALL RESPONSES TO PHQ QUESTIONS 1-9: 0
SUM OF ALL RESPONSES TO PHQ QUESTIONS 1-9: 0
2. FEELING DOWN, DEPRESSED OR HOPELESS: 0

## 2019-05-16 ASSESSMENT — ENCOUNTER SYMPTOMS
ABDOMINAL PAIN: 0
TROUBLE SWALLOWING: 0
SHORTNESS OF BREATH: 0

## 2019-05-16 NOTE — PROGRESS NOTES
Treatment Adherence:  Medication compliance:  compliant all of the time  Diet compliance:  compliant most of the time  Weight trend: stable  Current exercise: walks 1 time(s) per day  Barriers: none    Diabetes Mellitus Type 2: Current symptoms/problems include none. Home blood sugar records: trend: stable  Any episodes of hypoglycemia? no  Eye exam current (within one year): unknown  Tobacco history: She  reports that she has never smoked. She has never used smokeless tobacco.   Daily Aspirin? Yes    Glucophage,     Hypertension:  Home blood pressuremonitoring: Yes - normal .  She is adherent to a low sodium diet. Patient denies chest pain, shortness of breath, headache, lightheadedness and peripheral edema. Antihypertensive medication side effects:no medication side effects noted. Use of agents associated with hypertension: none. furosemide (Lasix) and lisinopril (Prinivil)      Hyperlipidemia:  No new myalgias or GI upset on none. Lab Results   Component Value Date    LABA1C 7.2 (H) 05/15/2019    LABA1C 6.4 (H) 10/25/2018    LABA1C 8.7 (H) 04/18/2018     Lab Results   Component Value Date    LABMICR <1.20 10/25/2018    CREATININE 0.6 10/25/2018     Lab Results   Component Value Date    ALT 13 10/25/2018    AST 17 10/25/2018     Lab Results   Component Value Date    CHOL 174 10/25/2018    TRIG 126 10/25/2018    HDL 63 (L) 10/25/2018    LDLCALC 86 10/25/2018        Past Medical History:   Diagnosis Date    Cancer [facility])     breast right breast    Diabetes type 2, controlled ([facility] [address]     Hypertension     Osteoarthritis       Prior to Visit Medications    Medication Sig Taking?  Authorizing Provider   glimepiride (AMARYL) 2 MG tablet Take 1 tablet by mouth every morning (before breakfast) Yes TREVON Bui   lisinopril (PRINIVIL;ZESTRIL) 5 MG tablet Take 1 tablet by mouth 2 times daily Yes TREVON Bui   furosemide (LASIX) 20 MG tablet Take 1 tablet by mouth daily Yes Marylen Anger, APRN   metFORMIN (GLUCOPHAGE) 1000 MG tablet Take 1 tablet by mouth 2 times daily (with meals) Yes Marylen Anger, APRN   blood glucose test strips (ONE TOUCH ULTRA TEST) strip USE TO TEST BLOOD SUGAR TWICE DAILY E11.9 Yes Marylen Anger, APRN   raloxifene (EVISTA) 60 MG tablet Take 1 tablet by mouth daily Yes Marylen Anger, APRN   UNABLE TO FIND Test strips insurance preferred  2 times a day FBS and 2 hour after meal  E11.9 Yes Marylen Anger, APRN   UNABLE TO FIND E11.9  glucomter Device  Insurance preference Yes Marylen Anger, APRN   Lancets MISC 2 times a day Yes Marylen Anger, APRN   aspirin 81 MG tablet Take 81 mg by mouth daily Yes Historical Provider, MD   Calcium Carbonate-Vitamin D (CALCIUM-VITAMIN D) 500-200 MG-UNIT per tablet Take 1 tablet by mouth 2 times daily (with meals) Yes Historical Provider, MD   Ketoprofen  MG CP24 Take 200 mg by mouth as needed Yes Historical Provider, MD     No Known Allergies    Review of Systems   Constitutional: Negative for fever and unexpected weight change. HENT: Negative for trouble swallowing. Respiratory: Negative for shortness of breath. Cardiovascular: Negative for chest pain. Gastrointestinal: Negative for abdominal pain. Genitourinary: Negative for difficulty urinating and pelvic pain. Musculoskeletal: Positive for arthralgias (hip right). Neurological: Negative for headaches. Psychiatric/Behavioral: Negative for dysphoric mood. The patient is not nervous/anxious. OBJECTIVE:    Physical Exam   Constitutional: She is oriented to person, place, and time. Vital signs are normal. She appears well-developed and well-nourished. She is active. HENT:   Head: Normocephalic and atraumatic. Eyes: Pupils are equal, round, and reactive to light. Conjunctivae and EOM are normal.   Neck: Normal range of motion and full passive range of motion without pain. Neck supple.    Cardiovascular: Normal rate, regular

## 2019-06-06 DIAGNOSIS — C50.911 MALIGNANT NEOPLASM OF RIGHT FEMALE BREAST, UNSPECIFIED ESTROGEN RECEPTOR STATUS, UNSPECIFIED SITE OF BREAST (HCC): ICD-10-CM

## 2019-06-06 RX ORDER — RALOXIFENE HYDROCHLORIDE 60 MG/1
60 TABLET, FILM COATED ORAL DAILY
Qty: 90 TABLET | Refills: 1 | Status: SHIPPED | OUTPATIENT
Start: 2019-06-06 | End: 2019-11-26 | Stop reason: SDUPTHER

## 2019-10-29 ENCOUNTER — TELEPHONE (OUTPATIENT)
Dept: GASTROENTEROLOGY | Facility: CLINIC | Age: 74
End: 2019-10-29

## 2019-10-29 NOTE — TELEPHONE ENCOUNTER
SPOKE WITH PT ABOUT BEING DUE FOR A REPEAT COLONOSCOPY. SHE SAID SHE WILL NEED TO LOOK AT HER SCHEDULE TO SEE WHEN SHE CAN GET OUT OF THE OFFICE FOR AN OV. BUT SHE WILL CALL IN THE NEXT FEW DAYS TO SET UP AN OV.   NO PCP LISTED.

## 2019-11-19 ENCOUNTER — TELEPHONE (OUTPATIENT)
Dept: FAMILY MEDICINE CLINIC | Age: 74
End: 2019-11-19

## 2019-11-19 DIAGNOSIS — E11.9 TYPE 2 DIABETES MELLITUS WITHOUT COMPLICATION, WITHOUT LONG-TERM CURRENT USE OF INSULIN (HCC): Primary | ICD-10-CM

## 2019-11-19 DIAGNOSIS — I10 ESSENTIAL HYPERTENSION: ICD-10-CM

## 2019-11-20 DIAGNOSIS — I10 ESSENTIAL HYPERTENSION: ICD-10-CM

## 2019-11-20 DIAGNOSIS — E11.9 TYPE 2 DIABETES MELLITUS WITHOUT COMPLICATION, WITHOUT LONG-TERM CURRENT USE OF INSULIN (HCC): ICD-10-CM

## 2019-11-20 LAB
ALBUMIN SERPL-MCNC: 4.2 G/DL (ref 3.5–5.2)
ALP BLD-CCNC: 62 U/L (ref 35–104)
ALT SERPL-CCNC: 14 U/L (ref 5–33)
ANION GAP SERPL CALCULATED.3IONS-SCNC: 16 MMOL/L (ref 7–19)
AST SERPL-CCNC: 16 U/L (ref 5–32)
BASOPHILS ABSOLUTE: 0.1 K/UL (ref 0–0.2)
BASOPHILS RELATIVE PERCENT: 0.9 % (ref 0–1)
BILIRUB SERPL-MCNC: 0.3 MG/DL (ref 0.2–1.2)
BUN BLDV-MCNC: 13 MG/DL (ref 8–23)
CALCIUM SERPL-MCNC: 9.4 MG/DL (ref 8.8–10.2)
CHLORIDE BLD-SCNC: 101 MMOL/L (ref 98–111)
CHOLESTEROL, TOTAL: 167 MG/DL (ref 160–199)
CO2: 23 MMOL/L (ref 22–29)
CREAT SERPL-MCNC: 0.5 MG/DL (ref 0.5–0.9)
EOSINOPHILS ABSOLUTE: 0.1 K/UL (ref 0–0.6)
EOSINOPHILS RELATIVE PERCENT: 2.1 % (ref 0–5)
GFR NON-AFRICAN AMERICAN: >60
GLUCOSE BLD-MCNC: 112 MG/DL (ref 74–109)
HBA1C MFR BLD: 6.9 % (ref 4–6)
HCT VFR BLD CALC: 45.1 % (ref 37–47)
HDLC SERPL-MCNC: 56 MG/DL (ref 65–121)
HEMOGLOBIN: 14 G/DL (ref 12–16)
IMMATURE GRANULOCYTES #: 0 K/UL
LDL CHOLESTEROL CALCULATED: 81 MG/DL
LYMPHOCYTES ABSOLUTE: 2.1 K/UL (ref 1.1–4.5)
LYMPHOCYTES RELATIVE PERCENT: 31.4 % (ref 20–40)
MCH RBC QN AUTO: 32.1 PG (ref 27–31)
MCHC RBC AUTO-ENTMCNC: 31 G/DL (ref 33–37)
MCV RBC AUTO: 103.4 FL (ref 81–99)
MONOCYTES ABSOLUTE: 0.6 K/UL (ref 0–0.9)
MONOCYTES RELATIVE PERCENT: 8.3 % (ref 0–10)
NEUTROPHILS ABSOLUTE: 3.8 K/UL (ref 1.5–7.5)
NEUTROPHILS RELATIVE PERCENT: 57 % (ref 50–65)
PDW BLD-RTO: 12.4 % (ref 11.5–14.5)
PLATELET # BLD: 243 K/UL (ref 130–400)
PMV BLD AUTO: 9.3 FL (ref 9.4–12.3)
POTASSIUM SERPL-SCNC: 3.5 MMOL/L (ref 3.5–5)
RBC # BLD: 4.36 M/UL (ref 4.2–5.4)
SODIUM BLD-SCNC: 140 MMOL/L (ref 136–145)
TOTAL PROTEIN: 7.5 G/DL (ref 6.6–8.7)
TRIGL SERPL-MCNC: 149 MG/DL (ref 0–149)
TSH SERPL DL<=0.05 MIU/L-ACNC: 2.35 UIU/ML (ref 0.27–4.2)
WBC # BLD: 6.7 K/UL (ref 4.8–10.8)

## 2019-11-26 ENCOUNTER — OFFICE VISIT (OUTPATIENT)
Dept: FAMILY MEDICINE CLINIC | Age: 74
End: 2019-11-26
Payer: MEDICARE

## 2019-11-26 VITALS
HEART RATE: 114 BPM | RESPIRATION RATE: 20 BRPM | SYSTOLIC BLOOD PRESSURE: 124 MMHG | BODY MASS INDEX: 27.14 KG/M2 | DIASTOLIC BLOOD PRESSURE: 82 MMHG | WEIGHT: 159 LBS | OXYGEN SATURATION: 95 % | HEIGHT: 64 IN | TEMPERATURE: 98.3 F

## 2019-11-26 DIAGNOSIS — C50.911 MALIGNANT NEOPLASM OF RIGHT FEMALE BREAST, UNSPECIFIED ESTROGEN RECEPTOR STATUS, UNSPECIFIED SITE OF BREAST (HCC): ICD-10-CM

## 2019-11-26 DIAGNOSIS — Z00.00 ROUTINE GENERAL MEDICAL EXAMINATION AT A HEALTH CARE FACILITY: ICD-10-CM

## 2019-11-26 DIAGNOSIS — E11.9 TYPE 2 DIABETES MELLITUS WITHOUT COMPLICATION, WITHOUT LONG-TERM CURRENT USE OF INSULIN (HCC): ICD-10-CM

## 2019-11-26 DIAGNOSIS — I10 ESSENTIAL HYPERTENSION: ICD-10-CM

## 2019-11-26 DIAGNOSIS — Z23 FLU VACCINE NEED: ICD-10-CM

## 2019-11-26 DIAGNOSIS — Z23 NEED FOR 23-POLYVALENT PNEUMOCOCCAL POLYSACCHARIDE VACCINE: Primary | ICD-10-CM

## 2019-11-26 LAB
CREATININE URINE: 81.8 MG/DL (ref 4.2–622)
MICROALBUMIN UR-MCNC: <1.2 MG/DL (ref 0–19)
MICROALBUMIN/CREAT UR-RTO: NORMAL MG/G

## 2019-11-26 PROCEDURE — 3044F HG A1C LEVEL LT 7.0%: CPT | Performed by: NURSE PRACTITIONER

## 2019-11-26 PROCEDURE — 1123F ACP DISCUSS/DSCN MKR DOCD: CPT | Performed by: NURSE PRACTITIONER

## 2019-11-26 PROCEDURE — G0438 PPPS, INITIAL VISIT: HCPCS | Performed by: NURSE PRACTITIONER

## 2019-11-26 PROCEDURE — G8482 FLU IMMUNIZE ORDER/ADMIN: HCPCS | Performed by: NURSE PRACTITIONER

## 2019-11-26 PROCEDURE — 4040F PNEUMOC VAC/ADMIN/RCVD: CPT | Performed by: NURSE PRACTITIONER

## 2019-11-26 PROCEDURE — 90732 PPSV23 VACC 2 YRS+ SUBQ/IM: CPT | Performed by: NURSE PRACTITIONER

## 2019-11-26 PROCEDURE — 90653 IIV ADJUVANT VACCINE IM: CPT | Performed by: NURSE PRACTITIONER

## 2019-11-26 PROCEDURE — G0009 ADMIN PNEUMOCOCCAL VACCINE: HCPCS | Performed by: NURSE PRACTITIONER

## 2019-11-26 PROCEDURE — G0008 ADMIN INFLUENZA VIRUS VAC: HCPCS | Performed by: NURSE PRACTITIONER

## 2019-11-26 PROCEDURE — 3017F COLORECTAL CA SCREEN DOC REV: CPT | Performed by: NURSE PRACTITIONER

## 2019-11-26 RX ORDER — FUROSEMIDE 20 MG/1
20 TABLET ORAL DAILY
Qty: 90 TABLET | Refills: 1 | Status: SHIPPED | OUTPATIENT
Start: 2019-11-26 | End: 2020-06-01 | Stop reason: SDUPTHER

## 2019-11-26 RX ORDER — GLIMEPIRIDE 2 MG/1
2 TABLET ORAL
Qty: 90 TABLET | Refills: 1 | Status: SHIPPED | OUTPATIENT
Start: 2019-11-26 | End: 2020-06-01 | Stop reason: SDUPTHER

## 2019-11-26 RX ORDER — RALOXIFENE HYDROCHLORIDE 60 MG/1
60 TABLET, FILM COATED ORAL DAILY
Qty: 90 TABLET | Refills: 1 | Status: SHIPPED | OUTPATIENT
Start: 2019-11-26 | End: 2020-06-01 | Stop reason: SDUPTHER

## 2019-11-26 RX ORDER — LISINOPRIL 5 MG/1
5 TABLET ORAL 2 TIMES DAILY
Qty: 180 TABLET | Refills: 1 | Status: SHIPPED | OUTPATIENT
Start: 2019-11-26 | End: 2020-06-01 | Stop reason: SDUPTHER

## 2019-11-26 ASSESSMENT — LIFESTYLE VARIABLES
AUDIT TOTAL SCORE: 1
HOW OFTEN DURING THE LAST YEAR HAVE YOU FOUND THAT YOU WERE NOT ABLE TO STOP DRINKING ONCE YOU HAD STARTED: 0
HOW OFTEN DO YOU HAVE A DRINK CONTAINING ALCOHOL: 1
HAVE YOU OR SOMEONE ELSE BEEN INJURED AS A RESULT OF YOUR DRINKING: 0
HOW OFTEN DURING THE LAST YEAR HAVE YOU HAD A FEELING OF GUILT OR REMORSE AFTER DRINKING: 0
HOW OFTEN DURING THE LAST YEAR HAVE YOU NEEDED AN ALCOHOLIC DRINK FIRST THING IN THE MORNING TO GET YOURSELF GOING AFTER A NIGHT OF HEAVY DRINKING: 0
HAS A RELATIVE, FRIEND, DOCTOR, OR ANOTHER HEALTH PROFESSIONAL EXPRESSED CONCERN ABOUT YOUR DRINKING OR SUGGESTED YOU CUT DOWN: 0
HOW OFTEN DURING THE LAST YEAR HAVE YOU BEEN UNABLE TO REMEMBER WHAT HAPPENED THE NIGHT BEFORE BECAUSE YOU HAD BEEN DRINKING: 0
HOW MANY STANDARD DRINKS CONTAINING ALCOHOL DO YOU HAVE ON A TYPICAL DAY: 0
HOW OFTEN DURING THE LAST YEAR HAVE YOU FAILED TO DO WHAT WAS NORMALLY EXPECTED FROM YOU BECAUSE OF DRINKING: 0
AUDIT-C TOTAL SCORE: 1
HOW OFTEN DO YOU HAVE SIX OR MORE DRINKS ON ONE OCCASION: 0

## 2019-11-26 ASSESSMENT — PATIENT HEALTH QUESTIONNAIRE - PHQ9
SUM OF ALL RESPONSES TO PHQ QUESTIONS 1-9: 0
SUM OF ALL RESPONSES TO PHQ QUESTIONS 1-9: 0

## 2020-04-20 VITALS
BODY MASS INDEX: 27.63 KG/M2 | SYSTOLIC BLOOD PRESSURE: 122 MMHG | HEART RATE: 103 BPM | WEIGHT: 156 LBS | DIASTOLIC BLOOD PRESSURE: 72 MMHG

## 2020-04-20 PROBLEM — R16.0 LIVER MASS: Status: ACTIVE | Noted: 2020-04-20

## 2020-05-18 ENCOUNTER — HOSPITAL ENCOUNTER (OUTPATIENT)
Dept: INFUSION THERAPY | Age: 75
End: 2020-05-18

## 2020-06-01 ENCOUNTER — TELEPHONE (OUTPATIENT)
Dept: FAMILY MEDICINE CLINIC | Age: 75
End: 2020-06-01

## 2020-06-01 RX ORDER — GLIMEPIRIDE 2 MG/1
2 TABLET ORAL
Qty: 90 TABLET | Refills: 1 | Status: SHIPPED | OUTPATIENT
Start: 2020-06-01 | End: 2020-08-11 | Stop reason: SDUPTHER

## 2020-06-01 RX ORDER — FUROSEMIDE 20 MG/1
20 TABLET ORAL DAILY
Qty: 90 TABLET | Refills: 1 | Status: SHIPPED | OUTPATIENT
Start: 2020-06-01 | End: 2020-08-11 | Stop reason: SDUPTHER

## 2020-06-01 RX ORDER — LISINOPRIL 5 MG/1
5 TABLET ORAL 2 TIMES DAILY
Qty: 180 TABLET | Refills: 1 | Status: SHIPPED | OUTPATIENT
Start: 2020-06-01 | End: 2020-08-11 | Stop reason: SDUPTHER

## 2020-06-01 RX ORDER — RALOXIFENE HYDROCHLORIDE 60 MG/1
60 TABLET, FILM COATED ORAL DAILY
Qty: 90 TABLET | Refills: 1 | Status: SHIPPED | OUTPATIENT
Start: 2020-06-01 | End: 2020-08-11 | Stop reason: SDUPTHER

## 2020-06-01 NOTE — TELEPHONE ENCOUNTER
Please call Anastasiia Rodriguez and find out what she needs called in and we will do labs the day of OV.

## 2020-06-02 ENCOUNTER — HOSPITAL ENCOUNTER (OUTPATIENT)
Dept: WOMENS IMAGING | Age: 75
Discharge: HOME | End: 2020-06-02
Payer: MEDICARE

## 2020-06-02 PROCEDURE — 77067 SCR MAMMO BI INCL CAD: CPT

## 2020-06-03 ENCOUNTER — TELEPHONE (OUTPATIENT)
Dept: FAMILY MEDICINE CLINIC | Age: 75
End: 2020-06-03

## 2020-06-03 NOTE — TELEPHONE ENCOUNTER
Pt wants you to order her A1C and other labs so she can get her lab work then she will set up an appt with you for a check up

## 2020-06-18 NOTE — PROGRESS NOTES
%.    Physical Exam  Constitutional:       Appearance: Normal appearance. She is normal weight. HENT:      Head: Normocephalic and atraumatic. Right Ear: External ear normal.      Left Ear: External ear normal.      Nose: Nose normal.      Mouth/Throat:      Mouth: Mucous membranes are moist.      Pharynx: Oropharynx is clear. Eyes:      General: No scleral icterus. Extraocular Movements: Extraocular movements intact. Conjunctiva/sclera: Conjunctivae normal.   Neck:      Musculoskeletal: Normal range of motion and neck supple. Cardiovascular:      Rate and Rhythm: Normal rate and regular rhythm. Pulses: Normal pulses. Heart sounds: Normal heart sounds. Pulmonary:      Effort: Pulmonary effort is normal.      Breath sounds: Normal breath sounds. No stridor. No wheezing or rales. Abdominal:      General: Abdomen is flat. Bowel sounds are normal. There is no distension. Palpations: Abdomen is soft. Musculoskeletal: Normal range of motion. General: No swelling or tenderness. Right lower leg: No edema. Left lower leg: No edema. Skin:     General: Skin is warm and dry. Findings: No lesion or rash. Neurological:      General: No focal deficit present. Mental Status: She is alert and oriented to person, place, and time. Mental status is at baseline. Motor: No weakness. Psychiatric:         Mood and Affect: Mood normal.               Labs:  BMP: No results for input(s): NA, K, CL, CO2, PHOS, BUN, CREATININE, CALCIUM in the last 72 hours. CBC:   Recent Labs     06/22/20  1010   WBC 7.10   HGB 14.5   HCT 42.9   .7*        LIVER PROFILE: No results for input(s): AST, ALT, LIPASE, BILIDIR, BILITOT, ALKPHOS in the last 72 hours. Invalid input(s): AMYLASE,  ALB  PT/INR: No results for input(s): PROTIME, INR in the last 72 hours. APTT: No results for input(s): APTT in the last 72 hours.   BNP:  No results for input(s): BNP in the last 72 hours. Ionized Calcium:No results for input(s): IONCA in the last 72 hours. Magnesium:No results for input(s): MG in the last 72 hours. Phosphorus:No results for input(s): PHOS in the last 72 hours. HgbA1C: No results for input(s): LABA1C in the last 72 hours. Hepatic: No results for input(s): ALKPHOS, ALT, AST, PROT, BILITOT, BILIDIR, LABALBU in the last 72 hours. Lactic Acid: No results for input(s): LACTA in the last 72 hours. Troponin: No results for input(s): CKTOTAL, CKMB, TROPONINT in the last 72 hours. ABGs: No results for input(s): PH, PCO2, PO2, HCO3, O2SAT in the last 72 hours. CRP:  No results for input(s): CRP in the last 72 hours. Sed Rate:  No results for input(s): SEDRATE in the last 72 hours. Cultures:   No results for input(s): CULTURE in the last 72 hours. Radiology reports as per the Radiologist  Radiology: [facility] Digital Screen Unilateral Left    Result Date: 6/2/2020  EXAMINATION:  [facility] DIGITAL SCREEN UNILATERAL LEFT  6/2/2020 2:43 PM CLINICAL HISTORY: Screening. Prior right mastectomy. Prior surgical biopsy on the left. No family history of breast cancer. COMPARISON STUDIES: 5/10/2019, 12/6/2018, 5/24/2018, 5/8/2017 and 5/1/2015. BREAST DENSITY: The breasts are heterogeneously dense which may lower the sensitivity of mammography (Pattern C). Digital and 3-D mammography were performed. Computer aided detection was utilized. TECHNIQUE: Digital 2-D and 3-D mammography were performed. Computer aided detection was utilized. The patient's information has been added to a reminder sytem with a target due date for the next mammogram. FINDINGS: The parenchymal pattern appears stable. There is scarring in the upper outer quadrant of the left breast related to surgical biopsy. This appears stable. There is a HydroMark clip in the medial breast related to prior image guided biopsy. There are no new masses. There are few scattered benign calcifications. There are no suspicious clusters. There is no skin thickening. 1. Benign mammogram. BI-RADS 2. 2. Screening mammography recommended in one year. 3. Computer aided detection and 3-D tomosynthesis were utilized. 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 · Chronic liver mass abnormality  · Port flush yearly  · Tumor screening and health maintenance    Ja Angelo was treated with neoadjuvant chemotherapy consisting of FAC x 3 followed by Radiation therapy to the right breast and chest wall area. A Right mastectomy in April 1993 was followed by adjuvant chemotherapy with FAC x 3 and CMF x 6. She is here for continued follow-up today for examination. Review of systems is unremarkable for new developments. Physical examination does not reveal evidence of local or regional recurrence in the right chest wall, axilla, supra or interclavicular areas. The left breast, axilla, supra and infraclavicular areas is unremarkable for abnormalities. Specifically in the upper lateral aspect of the left breast. This area is examined in detail without abnormality being felt. CBC today (6/22/2020) reveals a WBC of 7.10. Hgb 14.5 with an MCV of 101.7 and platelet count of 235,378. Unilateral left mammogram on 6/2/2020 was BI-RADS 2. Today's exam is completely negative. 2D ECHO on 12/6/2018 had an EF of 55-60%. Followup imaging will be done next year in May    #2  Breast cancer screening    Unilateral left mammogram on 6/2/2020 was BI-RADS 2.       #3  GI cancer screening    Colonoscopy by Dr. Amy Gibson on 10/29/2014 was remarkable for only 1 small polyp that was destroyed      #4  Bone health    DEXA scan at [facility] on 11/26/2018 showed osteopenia  She is on calcium and vitamin D    #5  GYN cancer screening    Cyndie Manningr, her PCP

## 2020-06-22 ENCOUNTER — HOSPITAL ENCOUNTER (OUTPATIENT)
Dept: INFUSION THERAPY | Age: 75
Discharge: HOME | End: 2020-06-22
Payer: MEDICARE

## 2020-06-22 ENCOUNTER — OFFICE VISIT (OUTPATIENT)
Dept: HEMATOLOGY | Age: 75
End: 2020-06-22
Payer: MEDICARE

## 2020-06-22 VITALS
HEART RATE: 112 BPM | DIASTOLIC BLOOD PRESSURE: 72 MMHG | BODY MASS INDEX: 26.91 KG/M2 | WEIGHT: 157.6 LBS | HEIGHT: 64 IN | OXYGEN SATURATION: 93 % | SYSTOLIC BLOOD PRESSURE: 122 MMHG | TEMPERATURE: 98.6 F

## 2020-06-22 DIAGNOSIS — C50.911 MALIGNANT NEOPLASM OF RIGHT FEMALE BREAST, UNSPECIFIED ESTROGEN RECEPTOR STATUS, UNSPECIFIED SITE OF BREAST (HCC): ICD-10-CM

## 2020-06-22 LAB
BASOPHILS ABSOLUTE: 0.06 K/UL (ref 0.01–0.08)
BASOPHILS RELATIVE PERCENT: 0.8 % (ref 0.1–1.2)
EOSINOPHILS ABSOLUTE: 0.15 K/UL (ref 0.04–0.54)
EOSINOPHILS RELATIVE PERCENT: 2.1 % (ref 0.7–7)
HCT VFR BLD CALC: 42.9 % (ref 34.1–44.9)
HEMOGLOBIN: 14.5 G/DL (ref 11.2–15.7)
LYMPHOCYTES ABSOLUTE: 2.26 K/UL (ref 1.18–3.74)
LYMPHOCYTES RELATIVE PERCENT: 31.8 % (ref 19.3–53.1)
MCH RBC QN AUTO: 34.4 PG (ref 25.6–32.2)
MCHC RBC AUTO-ENTMCNC: 33.8 G/DL (ref 32.3–35.5)
MCV RBC AUTO: 101.7 FL (ref 79.4–94.8)
MONOCYTES ABSOLUTE: 0.53 K/UL (ref 0.24–0.82)
MONOCYTES RELATIVE PERCENT: 7.5 % (ref 4.7–12.5)
NEUTROPHILS ABSOLUTE: 4.1 K/UL (ref 1.56–6.13)
NEUTROPHILS RELATIVE PERCENT: 57.8 % (ref 34–71.1)
PDW BLD-RTO: 11.9 % (ref 11.7–14.4)
PLATELET # BLD: 191 K/UL (ref 182–369)
PMV BLD AUTO: 9.2 FL (ref 7.4–10.4)
RBC # BLD: 4.22 M/UL (ref 3.93–5.22)
WBC # BLD: 7.1 K/UL (ref 3.98–10.04)

## 2020-06-22 PROCEDURE — 99215 OFFICE O/P EST HI 40 MIN: CPT | Performed by: INTERNAL MEDICINE

## 2020-06-22 PROCEDURE — 3017F COLORECTAL CA SCREEN DOC REV: CPT | Performed by: INTERNAL MEDICINE

## 2020-06-22 PROCEDURE — G8417 CALC BMI ABV UP PARAM F/U: HCPCS | Performed by: INTERNAL MEDICINE

## 2020-06-22 PROCEDURE — 1123F ACP DISCUSS/DSCN MKR DOCD: CPT | Performed by: INTERNAL MEDICINE

## 2020-06-22 PROCEDURE — G8427 DOCREV CUR MEDS BY ELIG CLIN: HCPCS | Performed by: INTERNAL MEDICINE

## 2020-06-22 PROCEDURE — G8399 PT W/DXA RESULTS DOCUMENT: HCPCS | Performed by: INTERNAL MEDICINE

## 2020-06-22 PROCEDURE — 1036F TOBACCO NON-USER: CPT | Performed by: INTERNAL MEDICINE

## 2020-06-22 PROCEDURE — 85025 COMPLETE CBC W/AUTO DIFF WBC: CPT

## 2020-06-22 PROCEDURE — 99212 OFFICE O/P EST SF 10 MIN: CPT

## 2020-06-22 PROCEDURE — 1090F PRES/ABSN URINE INCON ASSESS: CPT | Performed by: INTERNAL MEDICINE

## 2020-06-22 PROCEDURE — 4040F PNEUMOC VAC/ADMIN/RCVD: CPT | Performed by: INTERNAL MEDICINE

## 2020-06-22 ASSESSMENT — ENCOUNTER SYMPTOMS
STRIDOR: 0
FACIAL SWELLING: 0
BACK PAIN: 0
EYE DISCHARGE: 0
RECTAL PAIN: 0
EYE ITCHING: 0
DIARRHEA: 0
COLOR CHANGE: 0
EYE PAIN: 0
CHEST TIGHTNESS: 0
NAUSEA: 0
VOMITING: 0
TROUBLE SWALLOWING: 0
CONSTIPATION: 0
VOICE CHANGE: 0
SHORTNESS OF BREATH: 0
SINUS PAIN: 0
WHEEZING: 0
ABDOMINAL PAIN: 0

## 2020-06-25 RX ORDER — FUROSEMIDE 20 MG/1
20 TABLET ORAL 2 TIMES DAILY
COMMUNITY

## 2020-06-25 RX ORDER — LISINOPRIL 5 MG/1
5 TABLET ORAL DAILY
COMMUNITY

## 2020-06-25 RX ORDER — RALOXIFENE HYDROCHLORIDE 60 MG/1
60 TABLET, FILM COATED ORAL DAILY
COMMUNITY

## 2020-06-25 RX ORDER — ASPIRIN 81 MG/1
81 TABLET ORAL DAILY
COMMUNITY

## 2020-07-13 DIAGNOSIS — E11.9 TYPE 2 DIABETES MELLITUS WITHOUT COMPLICATION, WITHOUT LONG-TERM CURRENT USE OF INSULIN (HCC): ICD-10-CM

## 2020-07-13 DIAGNOSIS — R73.09 ELEVATED GLUCOSE: ICD-10-CM

## 2020-07-13 DIAGNOSIS — I10 ESSENTIAL HYPERTENSION: ICD-10-CM

## 2020-07-13 LAB
ALBUMIN SERPL-MCNC: 3.9 G/DL (ref 3.5–5.2)
ALP BLD-CCNC: 58 U/L (ref 35–104)
ALT SERPL-CCNC: 15 U/L (ref 5–33)
ANION GAP SERPL CALCULATED.3IONS-SCNC: 14 MMOL/L (ref 7–19)
AST SERPL-CCNC: 16 U/L (ref 5–32)
BASOPHILS ABSOLUTE: 0.1 K/UL (ref 0–0.2)
BASOPHILS RELATIVE PERCENT: 0.9 % (ref 0–1)
BILIRUB SERPL-MCNC: <0.2 MG/DL (ref 0.2–1.2)
BUN BLDV-MCNC: 14 MG/DL (ref 8–23)
CALCIUM SERPL-MCNC: 8.9 MG/DL (ref 8.8–10.2)
CHLORIDE BLD-SCNC: 103 MMOL/L (ref 98–111)
CHOLESTEROL, TOTAL: 170 MG/DL (ref 160–199)
CO2: 26 MMOL/L (ref 22–29)
CREAT SERPL-MCNC: 0.5 MG/DL (ref 0.5–0.9)
CREATININE URINE: 21.8 MG/DL (ref 4.2–622)
EOSINOPHILS ABSOLUTE: 0.1 K/UL (ref 0–0.6)
EOSINOPHILS RELATIVE PERCENT: 2.4 % (ref 0–5)
GFR NON-AFRICAN AMERICAN: >60
GLUCOSE BLD-MCNC: 191 MG/DL (ref 74–109)
HBA1C MFR BLD: 7.9 % (ref 4–6)
HCT VFR BLD CALC: 43.3 % (ref 37–47)
HDLC SERPL-MCNC: 59 MG/DL (ref 65–121)
HEMOGLOBIN: 13.9 G/DL (ref 12–16)
IMMATURE GRANULOCYTES #: 0 K/UL
LDL CHOLESTEROL CALCULATED: 92 MG/DL
LYMPHOCYTES ABSOLUTE: 1.8 K/UL (ref 1.1–4.5)
LYMPHOCYTES RELATIVE PERCENT: 31.8 % (ref 20–40)
MCH RBC QN AUTO: 33.3 PG (ref 27–31)
MCHC RBC AUTO-ENTMCNC: 32.1 G/DL (ref 33–37)
MCV RBC AUTO: 103.6 FL (ref 81–99)
MICROALBUMIN UR-MCNC: <1.2 MG/DL (ref 0–19)
MICROALBUMIN/CREAT UR-RTO: NORMAL MG/G
MONOCYTES ABSOLUTE: 0.5 K/UL (ref 0–0.9)
MONOCYTES RELATIVE PERCENT: 8.2 % (ref 0–10)
NEUTROPHILS ABSOLUTE: 3.2 K/UL (ref 1.5–7.5)
NEUTROPHILS RELATIVE PERCENT: 56.4 % (ref 50–65)
PDW BLD-RTO: 12.2 % (ref 11.5–14.5)
PLATELET # BLD: 223 K/UL (ref 130–400)
PMV BLD AUTO: 9.6 FL (ref 9.4–12.3)
POTASSIUM SERPL-SCNC: 4.3 MMOL/L (ref 3.5–5)
RBC # BLD: 4.18 M/UL (ref 4.2–5.4)
SODIUM BLD-SCNC: 143 MMOL/L (ref 136–145)
TOTAL PROTEIN: 7.1 G/DL (ref 6.6–8.7)
TRIGL SERPL-MCNC: 93 MG/DL (ref 0–149)
TSH SERPL DL<=0.05 MIU/L-ACNC: 2.5 UIU/ML (ref 0.27–4.2)
WBC # BLD: 5.7 K/UL (ref 4.8–10.8)

## 2020-07-27 ENCOUNTER — OFFICE VISIT (OUTPATIENT)
Dept: GASTROENTEROLOGY | Facility: CLINIC | Age: 75
End: 2020-07-27

## 2020-07-27 VITALS
HEART RATE: 109 BPM | TEMPERATURE: 98 F | OXYGEN SATURATION: 96 % | SYSTOLIC BLOOD PRESSURE: 160 MMHG | DIASTOLIC BLOOD PRESSURE: 90 MMHG | WEIGHT: 158 LBS | BODY MASS INDEX: 28 KG/M2 | HEIGHT: 63 IN

## 2020-07-27 DIAGNOSIS — Z86.010 HISTORY OF ADENOMATOUS POLYP OF COLON: Primary | ICD-10-CM

## 2020-07-27 DIAGNOSIS — I10 ESSENTIAL HYPERTENSION: ICD-10-CM

## 2020-07-27 DIAGNOSIS — E11.9 TYPE 2 DIABETES MELLITUS WITHOUT COMPLICATION, WITHOUT LONG-TERM CURRENT USE OF INSULIN (HCC): ICD-10-CM

## 2020-07-27 PROBLEM — Z86.0101 HISTORY OF ADENOMATOUS POLYP OF COLON: Status: ACTIVE | Noted: 2020-07-27

## 2020-07-27 PROCEDURE — S0260 H&P FOR SURGERY: HCPCS | Performed by: NURSE PRACTITIONER

## 2020-07-27 RX ORDER — GLIMEPIRIDE 2 MG/1
2 TABLET ORAL
COMMUNITY
Start: 2020-06-01

## 2020-07-27 NOTE — PROGRESS NOTES
[facility]    Primary Physician Sera Preston APRN    2020    Nasreen Ayala   1945      Chief Complaint   Patient presents with   • Colonoscopy       Subjective     HPI    Nasreen Ayala is a 74 y.o. female who presents as a referral for preventative maintenance. She has no complaints of nausea or vomiting. No change in bowels. No wt loss. No BRBPR. No melena. No abdominal pain.        Last colonoscopy was 10/2014 noting a small polyp destroyed and sigmoid diverticulosis. The patient does have history of colon adenomatous polyps . The patient does not  have history of colon cancer.   There is not a  family history of colon polyps. Her sister had a pseudomyxoma involving her colon.     Past Medical History:   Diagnosis Date   • Breast cancer (CMS/HCC)    • Colon polyp    • Diabetes mellitus (CMS/HCC)    • Diverticulosis    • Hemorrhoids    • Hypertension        Past Surgical History:   Procedure Laterality Date   •  SECTION     • COLONOSCOPY  10/29/2014    polyp destroyed, mild diverticulosis   • COLONOSCOPY  2008    small internal hemorrhoid, 2 polyps, adenomatous, mild diverticulosis   • MASTECTOMY         Outpatient Medications Marked as Taking for the 20 encounter (Office Visit) with Rhonda Hansen APRN   Medication Sig Dispense Refill   • aspirin 81 MG EC tablet Take 81 mg by mouth Daily.     • furosemide (LASIX) 20 MG tablet Take 20 mg by mouth 2 (Two) Times a Day.     • glimepiride (AMARYL) 2 MG tablet Take 2 mg by mouth.     • lisinopril (PRINIVIL,ZESTRIL) 5 MG tablet Take 5 mg by mouth Daily.     • metFORMIN (GLUCOPHAGE) 1000 MG tablet TAKE 1 TABLET BY MOUTH TWICE DAILY WITH MEALS     • raloxifene (EVISTA) 60 MG tablet Take 60 mg by mouth Daily.         No Known Allergies    Social History     Socioeconomic History   • Marital status: Single     Spouse name: Not on file   • Number of children: Not on file   • Years of education: Not on file   •  Highest education level: Not on file   Tobacco Use   • Smoking status: Never Smoker   • Smokeless tobacco: Never Used   Substance and Sexual Activity   • Alcohol use: Yes     Comment: occ   • Drug use: Never   • Sexual activity: Defer       Family History   Problem Relation Age of Onset   • Colon cancer Sister         psuedomyxoma   • Colon polyps Neg Hx        Review of Systems   Constitutional: Negative for appetite change, chills, fatigue, fever and unexpected weight change.   HENT: Negative for sore throat and trouble swallowing.    Eyes: Negative for visual disturbance.   Respiratory: Negative for shortness of breath and wheezing.    Cardiovascular: Negative for chest pain and palpitations.   Gastrointestinal: Negative for abdominal distention, abdominal pain, anal bleeding, blood in stool, constipation, diarrhea, nausea and vomiting.        As mentioned in hpi   Genitourinary: Negative for difficulty urinating and hematuria.   Musculoskeletal: Negative for arthralgias and back pain.   Skin: Negative for color change and rash.   Neurological: Negative for dizziness, seizures, syncope, light-headedness and headaches.   Hematological: Negative for adenopathy.   Psychiatric/Behavioral: Negative for confusion. The patient is not nervous/anxious.        Objective     Vitals:    07/27/20 1358   BP: 160/90   Pulse: 109   Temp: 98 °F (36.7 °C)   SpO2: 96%         07/27/20  1358   Weight: 71.7 kg (158 lb)     Body mass index is 27.99 kg/m².    Physical Exam   Constitutional: She appears well-developed and well-nourished. No distress.   HENT:   Head: Normocephalic and atraumatic.   Eyes: EOM are normal. No scleral icterus.   Neck: Neck supple. No JVD present.   Cardiovascular: Normal rate and regular rhythm.   Murmur (systolic . echo 2018 mild aortic stenosis) heard.  Pulmonary/Chest: Effort normal and breath sounds normal.   Abdominal: Soft. Bowel sounds are normal. She exhibits no distension. There is no tenderness.    Musculoskeletal: Normal range of motion. She exhibits no deformity.   Neurological: She is alert.   Skin: Skin is warm and dry. No rash noted.   Psychiatric: She has a normal mood and affect. Her behavior is normal.   Vitals reviewed.      Imaging Results (Most Recent)     None          Assessment/Plan     Nasreen was seen today for colonoscopy.    Diagnoses and all orders for this visit:    History of adenomatous polyp of colon  -     Case Request; Standing  -     Case Request    Essential hypertension    Type 2 diabetes mellitus without complication, without long-term current use of insulin (CMS/[facility])    Other orders  -     Follow Anesthesia Guidelines / Protocol; Future  -     Obtain Informed Consent; Future  -     polyethylene glycol (Golytely) 236 g solution; Take 4,000 mL by mouth 1 (One) Time for 1 dose. Take as directed per instruction sheet.          Plan for colonoscopy. She will need to call back to schedule.  The patient was advised to take any blood pressure or heart  medications the morning of  procedure if that is when he/she normally takes. The patient was instructed how to adjust diabetes medications the am of procedure.               Body mass index is 27.99 kg/m².    Patient's Body mass index is 27.99 kg/m². BMI is within normal parameters. No follow-up required..      Colonoscopy   All risks, benefits, alternatives, and indications of colonoscopy procedure have been discussed with the patient. Risks to include perforation of the colon requiring possible surgery or colostomy, risk of bleeding from biopsies or removal of colon tissue, possibility of missing a colon polyp or cancer, or adverse drug reaction.  Benefits to include the diagnosis and management of disease of the colon and rectum. Alternatives to include barium enema, radiographic evaluation, lab testing or no intervention. Pt verbalizes understanding and agrees.         DEJUAN Sheldon Dragon/transcription disclaimer:   Much of this encounter note is electronic transcription/translation of spoken language to printed text.  The electronic translation of spoken language may be erroneous, or at times, nonsensical words or phrases may be inadvertently transcribed.  Although I have reviewed the note for such errors, some may still exist.

## 2020-08-11 ENCOUNTER — VIRTUAL VISIT (OUTPATIENT)
Dept: FAMILY MEDICINE CLINIC | Age: 75
End: 2020-08-11
Payer: MEDICARE

## 2020-08-11 PROCEDURE — 1036F TOBACCO NON-USER: CPT | Performed by: NURSE PRACTITIONER

## 2020-08-11 PROCEDURE — 1123F ACP DISCUSS/DSCN MKR DOCD: CPT | Performed by: NURSE PRACTITIONER

## 2020-08-11 PROCEDURE — G8428 CUR MEDS NOT DOCUMENT: HCPCS | Performed by: NURSE PRACTITIONER

## 2020-08-11 PROCEDURE — 2022F DILAT RTA XM EVC RTNOPTHY: CPT | Performed by: NURSE PRACTITIONER

## 2020-08-11 PROCEDURE — 1090F PRES/ABSN URINE INCON ASSESS: CPT | Performed by: NURSE PRACTITIONER

## 2020-08-11 PROCEDURE — 3017F COLORECTAL CA SCREEN DOC REV: CPT | Performed by: NURSE PRACTITIONER

## 2020-08-11 PROCEDURE — 99214 OFFICE O/P EST MOD 30 MIN: CPT | Performed by: NURSE PRACTITIONER

## 2020-08-11 PROCEDURE — 3051F HG A1C>EQUAL 7.0%<8.0%: CPT | Performed by: NURSE PRACTITIONER

## 2020-08-11 PROCEDURE — G8399 PT W/DXA RESULTS DOCUMENT: HCPCS | Performed by: NURSE PRACTITIONER

## 2020-08-11 PROCEDURE — 4040F PNEUMOC VAC/ADMIN/RCVD: CPT | Performed by: NURSE PRACTITIONER

## 2020-08-11 PROCEDURE — G8417 CALC BMI ABV UP PARAM F/U: HCPCS | Performed by: NURSE PRACTITIONER

## 2020-08-11 RX ORDER — RALOXIFENE HYDROCHLORIDE 60 MG/1
60 TABLET, FILM COATED ORAL DAILY
Qty: 90 TABLET | Refills: 1 | Status: SHIPPED | OUTPATIENT
Start: 2020-08-11 | End: 2021-06-09 | Stop reason: SDUPTHER

## 2020-08-11 RX ORDER — GLIMEPIRIDE 2 MG/1
2 TABLET ORAL
Qty: 90 TABLET | Refills: 1 | Status: SHIPPED | OUTPATIENT
Start: 2020-08-11 | End: 2021-06-09 | Stop reason: SDUPTHER

## 2020-08-11 RX ORDER — FUROSEMIDE 20 MG/1
20 TABLET ORAL DAILY
Qty: 90 TABLET | Refills: 1 | Status: SHIPPED | OUTPATIENT
Start: 2020-08-11 | End: 2021-06-09 | Stop reason: SDUPTHER

## 2020-08-11 RX ORDER — LISINOPRIL 5 MG/1
5 TABLET ORAL 2 TIMES DAILY
Qty: 180 TABLET | Refills: 1 | Status: SHIPPED | OUTPATIENT
Start: 2020-08-11 | End: 2021-06-09 | Stop reason: SDUPTHER

## 2020-08-11 NOTE — PROGRESS NOTES
affirm this is a Patient Initiated Episode with a Patient who has not had a related appointment within my department in the past 7 days or scheduled within the next 24 hours.     Patient identification was verified at the start of the visit: Yes    Total Time: minutes: 11-20 minutes    Note: not billable if this call serves to triage the patient into an appointment for the relevant concern      Kirk Tim

## 2020-08-25 ENCOUNTER — TRANSCRIBE ORDERS (OUTPATIENT)
Dept: ADMINISTRATIVE | Facility: HOSPITAL | Age: 75
End: 2020-08-25

## 2020-08-25 DIAGNOSIS — Z01.818 PRE-OP TESTING: Primary | ICD-10-CM

## 2020-08-29 ENCOUNTER — LAB (OUTPATIENT)
Dept: LAB | Facility: HOSPITAL | Age: 75
End: 2020-08-29

## 2020-08-29 PROCEDURE — U0003 INFECTIOUS AGENT DETECTION BY NUCLEIC ACID (DNA OR RNA); SEVERE ACUTE RESPIRATORY SYNDROME CORONAVIRUS 2 (SARS-COV-2) (CORONAVIRUS DISEASE [COVID-19]), AMPLIFIED PROBE TECHNIQUE, MAKING USE OF HIGH THROUGHPUT TECHNOLOGIES AS DESCRIBED BY CMS-2020-01-R: HCPCS | Performed by: INTERNAL MEDICINE

## 2020-08-29 PROCEDURE — C9803 HOPD COVID-19 SPEC COLLECT: HCPCS | Performed by: INTERNAL MEDICINE

## 2020-08-30 LAB
COVID LABCORP PRIORITY: NORMAL
SARS-COV-2 RNA RESP QL NAA+PROBE: NOT DETECTED

## 2020-09-01 ENCOUNTER — TELEPHONE (OUTPATIENT)
Dept: GASTROENTEROLOGY | Facility: CLINIC | Age: 75
End: 2020-09-01

## 2020-09-01 ENCOUNTER — ANESTHESIA (OUTPATIENT)
Dept: GASTROENTEROLOGY | Facility: HOSPITAL | Age: 75
End: 2020-09-01

## 2020-09-01 ENCOUNTER — ANESTHESIA EVENT (OUTPATIENT)
Dept: GASTROENTEROLOGY | Facility: HOSPITAL | Age: 75
End: 2020-09-01

## 2020-09-01 ENCOUNTER — HOSPITAL ENCOUNTER (OUTPATIENT)
Facility: HOSPITAL | Age: 75
Setting detail: HOSPITAL OUTPATIENT SURGERY
Discharge: HOME | End: 2020-09-01
Attending: INTERNAL MEDICINE | Admitting: INTERNAL MEDICINE

## 2020-09-01 VITALS
SYSTOLIC BLOOD PRESSURE: 169 MMHG | DIASTOLIC BLOOD PRESSURE: 96 MMHG | HEART RATE: 96 BPM | RESPIRATION RATE: 18 BRPM | HEIGHT: 64 IN | BODY MASS INDEX: 26.29 KG/M2 | OXYGEN SATURATION: 98 % | TEMPERATURE: 98.4 F | WEIGHT: 154 LBS

## 2020-09-01 DIAGNOSIS — Z86.010 HISTORY OF ADENOMATOUS POLYP OF COLON: ICD-10-CM

## 2020-09-01 LAB — GLUCOSE BLDC GLUCOMTR-MCNC: 154 MG/DL (ref 70–130)

## 2020-09-01 PROCEDURE — 45385 COLONOSCOPY W/LESION REMOVAL: CPT | Performed by: INTERNAL MEDICINE

## 2020-09-01 PROCEDURE — 45380 COLONOSCOPY AND BIOPSY: CPT | Performed by: INTERNAL MEDICINE

## 2020-09-01 PROCEDURE — 45381 COLONOSCOPY SUBMUCOUS NJX: CPT | Performed by: INTERNAL MEDICINE

## 2020-09-01 PROCEDURE — 82962 GLUCOSE BLOOD TEST: CPT

## 2020-09-01 PROCEDURE — 88305 TISSUE EXAM BY PATHOLOGIST: CPT | Performed by: INTERNAL MEDICINE

## 2020-09-01 PROCEDURE — 25010000002 PROPOFOL 10 MG/ML EMULSION: Performed by: NURSE ANESTHETIST, CERTIFIED REGISTERED

## 2020-09-01 RX ORDER — SODIUM CHLORIDE 9 MG/ML
500 INJECTION, SOLUTION INTRAVENOUS CONTINUOUS PRN
Status: DISCONTINUED | OUTPATIENT
Start: 2020-09-01 | End: 2020-09-01 | Stop reason: HOSPADM

## 2020-09-01 RX ORDER — SODIUM CHLORIDE 0.9 % (FLUSH) 0.9 %
10 SYRINGE (ML) INJECTION AS NEEDED
Status: DISCONTINUED | OUTPATIENT
Start: 2020-09-01 | End: 2020-09-01 | Stop reason: HOSPADM

## 2020-09-01 RX ORDER — LIDOCAINE HYDROCHLORIDE 10 MG/ML
0.5 INJECTION, SOLUTION EPIDURAL; INFILTRATION; INTRACAUDAL; PERINEURAL ONCE AS NEEDED
Status: CANCELLED | OUTPATIENT
Start: 2020-09-01

## 2020-09-01 RX ORDER — PROPOFOL 10 MG/ML
VIAL (ML) INTRAVENOUS AS NEEDED
Status: DISCONTINUED | OUTPATIENT
Start: 2020-09-01 | End: 2020-09-01 | Stop reason: SURG

## 2020-09-01 RX ORDER — ONDANSETRON 2 MG/ML
4 INJECTION INTRAMUSCULAR; INTRAVENOUS ONCE AS NEEDED
Status: DISCONTINUED | OUTPATIENT
Start: 2020-09-01 | End: 2020-09-01 | Stop reason: HOSPADM

## 2020-09-01 RX ADMIN — PROPOFOL 350 MG: 10 INJECTION, EMULSION INTRAVENOUS at 10:39

## 2020-09-01 RX ADMIN — SODIUM CHLORIDE 500 ML: 9 INJECTION, SOLUTION INTRAVENOUS at 10:14

## 2020-09-01 RX ADMIN — SODIUM CHLORIDE: 9 INJECTION, SOLUTION INTRAVENOUS at 10:46

## 2020-09-01 RX ADMIN — LIDOCAINE HYDROCHLORIDE 100 MG: 20 INJECTION, SOLUTION INTRAVENOUS at 10:40

## 2020-09-01 NOTE — ANESTHESIA POSTPROCEDURE EVALUATION
"Patient: Nasreen Ayala    Procedure Summary     Date:  09/01/20 Room / Location:  [facility] ENDOSCOPY 5 / [facility] ENDOS[facility]    Anesthesia Start:  1035 Anesthesia Stop:  1118    Procedure:  COLONOSCOPY WITH ANESTHESIA (N/A ) Diagnosis:       History of adenomatous polyp of colon      (History of adenomatous polyp of colon [Z86.010])    Surgeon:  Efraín Beck MD Provider:  Janna Allan CRNA    Anesthesia Type:  MAC ASA Status:  2          Anesthesia Type: MAC    Vitals  Vitals Value Taken Time   /100 9/1/2020 11:45 AM   Temp     Pulse 89 9/1/2020 11:49 AM   Resp 18 9/1/2020 11:35 AM   SpO2 95 % 9/1/2020 11:49 AM   Vitals shown include unvalidated device data.        Post Anesthesia Care and Evaluation    Patient location during evaluation: PHASE II  Patient participation: complete - patient participated  Level of consciousness: awake and awake and alert  Pain score: 0  Pain management: adequate  Airway patency: patent  Anesthetic complications: No anesthetic complications  PONV Status: none  Cardiovascular status: acceptable  Respiratory status: acceptable  Hydration status: acceptable    Comments: Patient discharged according to acceptable Anderson score per RN assessment. See nursing records for further information.     Blood pressure 169/96, pulse 96, temperature 98.4 °F (36.9 °C), temperature source Temporal, resp. rate 18, height 162.6 cm (64\"), weight 69.9 kg (154 lb), SpO2 98 %, not currently breastfeeding.        "

## 2020-09-01 NOTE — TELEPHONE ENCOUNTER
She is considering going to [place] to see Dr. siddiqi's group about colonoscopy with polypectomy attempt for large hepatic flexure polyp.  She is to call us in a couple days to discuss options.  But she want to know exactly where the doctor was located.  Can you give her a call and let her know where Dr. siddiqi's group is located and would be performing the colonoscopy.  Thanks

## 2020-09-01 NOTE — ANESTHESIA PREPROCEDURE EVALUATION
Anesthesia Evaluation     Patient summary reviewed   no history of anesthetic complications:  NPO Solid Status: > 8 hours  NPO Liquid Status: > 2 hours           Airway   Mallampati: I  TM distance: >3 FB  Neck ROM: full  No difficulty expected  Dental      Comment: Front upper caps    Pulmonary - negative pulmonary ROS   Cardiovascular   Exercise tolerance: good (4-7 METS)    (+) hypertension,       Neuro/Psych- negative ROS  GI/Hepatic/Renal/Endo    (+)   diabetes mellitus,   (-) liver disease, no renal disease    Musculoskeletal     Abdominal    Substance History      OB/GYN          Other      history of cancer (breast) remission                    Anesthesia Plan    ASA 2     MAC     intravenous induction     Anesthetic plan, all risks, benefits, and alternatives have been provided, discussed and informed consent has been obtained with: patient.

## 2020-09-01 NOTE — H&P
[facility] Gastroenterology  Pre Procedure History & Physical    Chief Complaint:   Colon polyps    Subjective     HPI:   The patient has a history of colon polyps who presents for exam.    Past Medical History:   Past Medical History:   Diagnosis Date   • Breast cancer (CMS/HCC)    • Colon polyp    • Diabetes mellitus (CMS/HCC)    • Diverticulosis    • Hemorrhoids    • Hypertension        Past Surgical History:  Past Surgical History:   Procedure Laterality Date   • BREAST BIOPSY     •  SECTION     • COLONOSCOPY  10/29/2014    polyp destroyed, mild diverticulosis   • COLONOSCOPY  2008    small internal hemorrhoid, 2 polyps, adenomatous, mild diverticulosis   • MASTECTOMY         Family History:  Family History   Problem Relation Age of Onset   • Colon cancer Sister         psuedomyxoma   • No Known Problems Mother    • Diabetes Father    • Colon polyps Neg Hx        Social History:   reports that she has never smoked. She has never used smokeless tobacco. She reports that she drinks alcohol. She reports that she does not use drugs.    Medications:   Prior to Admission medications    Medication Sig Start Date End Date Taking? Authorizing Provider   aspirin 81 MG EC tablet Take 81 mg by mouth Daily.   Yes Ashish Patel MD   furosemide (LASIX) 20 MG tablet Take 20 mg by mouth 2 (Two) Times a Day.   Yes Ashish Patel MD   glimepiride (AMARYL) 2 MG tablet Take 2 mg by mouth. 20  Yes Ashish Patel MD   KRILL OIL PO Take  by mouth.   Yes Ashish Patel MD   lisinopril (PRINIVIL,ZESTRIL) 5 MG tablet Take 5 mg by mouth Daily.   Yes Ashish Patel MD   metFORMIN (GLUCOPHAGE) 1000 MG tablet TAKE 1 TABLET BY MOUTH TWICE DAILY WITH MEALS 20  Yes Ashish Patel MD   raloxifene (EVISTA) 60 MG tablet Take 60 mg by mouth Daily.   Yes Ashish Patel MD   VITAMIN D PO Take  by mouth.  20  Ashish Patel MD       Allergies:  Patient has no known  "allergies.    ROS:    General: Weight stable  Resp: No SOA  Cardiovascular: No CP    Objective     Blood pressure (!) 186/98, pulse 105, temperature 98.4 °F (36.9 °C), temperature source Temporal, resp. rate 18, height 162.6 cm (64\"), weight 69.9 kg (154 lb), SpO2 96 %, not currently breastfeeding.    Physical Exam   Constitutional: Pt is oriented to person, place, and in no distress.   HENT: Mouth/Throat: Oropharynx is clear.   Cardiovascular: Normal rate, regular rhythm.    Pulmonary/Chest: Effort normal. No respiratory distress. No  wheezes.   Abdominal: Soft. Non-distended.  Skin: Skin is warm and dry.   Psychiatric: Mood, memory, affect and judgment appear normal.     Assessment/Plan     Diagnosis:  Colon polyps    Anticipated Surgical Procedure:  Colonoscopy    The risks, benefits, and alternatives of this procedure have been discussed with the patient or the responsible party- the patient understands and agrees to proceed.      EMR Dragon/transcription disclaimer:  Much of this encounter note is electronic transcription/translation of spoken language to printed text.  The electronic translation of spoken language may be erroneous, or at times, nonsensical words or phrases may be inadvertently transcribed.  Although I have reviewed the note for such errors, some may still exist.  "

## 2020-09-02 LAB
CYTO UR: NORMAL
LAB AP CASE REPORT: NORMAL
PATH REPORT.FINAL DX SPEC: NORMAL
PATH REPORT.GROSS SPEC: NORMAL

## 2020-09-02 NOTE — TELEPHONE ENCOUNTER
PT called .  I gave her Dr. Reese's address.  She is going to google her.      PT has concerns.  She is wanting to have the procedure/OV all in one day.  PT has a three hour drive each way.    She is concerned about finding a .   She was hoping they could use all your paperwork.

## 2020-09-03 ENCOUNTER — TELEPHONE (OUTPATIENT)
Dept: GASTROENTEROLOGY | Facility: CLINIC | Age: 75
End: 2020-09-03

## 2020-09-03 DIAGNOSIS — Z98.890 H/O COLONOSCOPY WITH POLYPECTOMY: Primary | ICD-10-CM

## 2020-09-03 DIAGNOSIS — Z86.010 H/O COLONOSCOPY WITH POLYPECTOMY: Primary | ICD-10-CM

## 2020-09-03 NOTE — TELEPHONE ENCOUNTER
It is up to Dr. Reese's group if they will pursue colonoscopy without office visit.  I talked to the patient at length about the risks and the option of surgery.

## 2020-09-03 NOTE — TELEPHONE ENCOUNTER
"MARY ELLEN, I called and discussed the pathology with the patient.  We discussed the options again of reattempted polypectomy via colonoscopy versus surgery.  We previously discussed this clean the risks of possible perforation bleeding with colonoscopy attempt and the possible complications that can occur surgery, expectations either way.  And questions have been answered.    She tells me today that she wishes to go ahead and see Dr. Springer"s group in [place] for attempted polypectomy via colonoscopy technique.  If they are not able to remove it, then she be willing to investigate surgical resection.    Please go ahead and make the appointment with Dr. Reese's group.  I asked the patient to call us when she is heard from their group when her appointment is.    I also tentatively recommend a colonoscopy examination again in 1 year.  I told her though that she should follow Dr. Torres's group for recommendation after colonoscopy for when the next colonoscopy should be.  Timing may change based on her findings and what they are able to do.  She expressed understanding.  Tentatively we currently have her in recall for 1 year.    Patient will call us with any questions or concerns and an update when she gets her appointment or if she does not hear anything.  "

## 2020-09-21 ENCOUNTER — TELEPHONE (OUTPATIENT)
Dept: FAMILY MEDICINE CLINIC | Age: 75
End: 2020-09-21

## 2020-09-21 NOTE — TELEPHONE ENCOUNTER
Poornima Oconnell needs a new blood sugar monitor . She uses the one touch ultra mini .  Please send to pharmacy

## 2020-09-24 DIAGNOSIS — C50.911 MALIGNANT NEOPLASM OF RIGHT FEMALE BREAST, UNSPECIFIED ESTROGEN RECEPTOR STATUS, UNSPECIFIED SITE OF BREAST (HCC): ICD-10-CM

## 2020-09-24 DIAGNOSIS — D64.9 ANEMIA, UNSPECIFIED TYPE: ICD-10-CM

## 2020-09-24 LAB
BASOPHILS ABSOLUTE: 0.1 K/UL (ref 0–0.2)
BASOPHILS RELATIVE PERCENT: 0.8 % (ref 0–1)
EOSINOPHILS ABSOLUTE: 0.1 K/UL (ref 0–0.6)
EOSINOPHILS RELATIVE PERCENT: 2 % (ref 0–5)
FERRITIN: 122.6 NG/ML (ref 13–150)
FOLATE: 17 NG/ML (ref 4.8–37.3)
HCT VFR BLD CALC: 42.6 % (ref 37–47)
HEMOGLOBIN: 13.9 G/DL (ref 12–16)
IMMATURE GRANULOCYTES #: 0 K/UL
IRON SATURATION: 31 % (ref 14–50)
IRON: 97 UG/DL (ref 37–145)
LYMPHOCYTES ABSOLUTE: 2 K/UL (ref 1.1–4.5)
LYMPHOCYTES RELATIVE PERCENT: 31.8 % (ref 20–40)
MCH RBC QN AUTO: 32.3 PG (ref 27–31)
MCHC RBC AUTO-ENTMCNC: 32.6 G/DL (ref 33–37)
MCV RBC AUTO: 99.1 FL (ref 81–99)
MONOCYTES ABSOLUTE: 0.5 K/UL (ref 0–0.9)
MONOCYTES RELATIVE PERCENT: 7.4 % (ref 0–10)
NEUTROPHILS ABSOLUTE: 3.7 K/UL (ref 1.5–7.5)
NEUTROPHILS RELATIVE PERCENT: 57.5 % (ref 50–65)
PDW BLD-RTO: 12.5 % (ref 11.5–14.5)
PLATELET # BLD: 232 K/UL (ref 130–400)
PMV BLD AUTO: 9.5 FL (ref 9.4–12.3)
RBC # BLD: 4.3 M/UL (ref 4.2–5.4)
RETICULOCYTE ABSOLUTE COUNT: 0.12 M/UL (ref 0.03–0.12)
RETICULOCYTE COUNT PCT: 2.74 % (ref 0.5–1.5)
TOTAL IRON BINDING CAPACITY: 310 UG/DL (ref 250–400)
VITAMIN B-12: 327 PG/ML (ref 211–946)
WBC # BLD: 6.4 K/UL (ref 4.8–10.8)

## 2020-09-29 ENCOUNTER — TELEPHONE (OUTPATIENT)
Dept: GASTROENTEROLOGY | Facility: CLINIC | Age: 75
End: 2020-09-29

## 2020-12-08 ENCOUNTER — TRANSCRIBE ORDERS (OUTPATIENT)
Dept: LAB | Facility: HOSPITAL | Age: 75
End: 2020-12-08

## 2020-12-08 DIAGNOSIS — Z01.818 PREOPERATIVE TESTING: Primary | ICD-10-CM

## 2020-12-14 ENCOUNTER — LAB (OUTPATIENT)
Dept: LAB | Facility: HOSPITAL | Age: 75
End: 2020-12-14

## 2020-12-14 DIAGNOSIS — Z01.818 PREOPERATIVE TESTING: ICD-10-CM

## 2020-12-14 LAB — SARS-COV-2 ORF1AB RESP QL NAA+PROBE: NOT DETECTED

## 2020-12-14 PROCEDURE — C9803 HOPD COVID-19 SPEC COLLECT: HCPCS

## 2020-12-14 PROCEDURE — U0004 COV-19 TEST NON-CDC HGH THRU: HCPCS | Performed by: SURGERY

## 2021-01-05 ENCOUNTER — TELEPHONE (OUTPATIENT)
Dept: GASTROENTEROLOGY | Facility: CLINIC | Age: 76
End: 2021-01-05

## 2021-01-05 NOTE — TELEPHONE ENCOUNTER
This is a [facility] chart update note:  I reviewed surgical report from Dr. Graf in [place].  He performed a right hemicolectomy for the unresectable hepatic flexure polyp.  She was referred to [place] for reattempt a colonoscopy resection of the polyp.  I do not have all the records but apparently she was referred to Dr. Graf for surgical resection.  I did see Dr. Graf note stating that this was a stage I cancer but he was referring her to GI oncology in [place] for opinion.

## 2021-05-26 DIAGNOSIS — I10 ESSENTIAL HYPERTENSION: ICD-10-CM

## 2021-05-26 DIAGNOSIS — E11.9 TYPE 2 DIABETES MELLITUS WITHOUT COMPLICATION, WITHOUT LONG-TERM CURRENT USE OF INSULIN (HCC): ICD-10-CM

## 2021-05-26 DIAGNOSIS — C50.911 MALIGNANT NEOPLASM OF RIGHT FEMALE BREAST, UNSPECIFIED ESTROGEN RECEPTOR STATUS, UNSPECIFIED SITE OF BREAST (HCC): ICD-10-CM

## 2021-06-03 RX ORDER — RALOXIFENE HYDROCHLORIDE 60 MG/1
TABLET, FILM COATED ORAL
Qty: 90 TABLET | Refills: 0 | OUTPATIENT
Start: 2021-06-03

## 2021-06-03 RX ORDER — GLIMEPIRIDE 2 MG/1
TABLET ORAL
Qty: 90 TABLET | Refills: 0 | OUTPATIENT
Start: 2021-06-03

## 2021-06-03 RX ORDER — FUROSEMIDE 20 MG/1
TABLET ORAL
Qty: 90 TABLET | Refills: 0 | OUTPATIENT
Start: 2021-06-03

## 2021-06-09 ENCOUNTER — OFFICE VISIT (OUTPATIENT)
Dept: FAMILY MEDICINE CLINIC | Age: 76
End: 2021-06-09
Payer: MEDICARE

## 2021-06-09 VITALS
HEART RATE: 108 BPM | DIASTOLIC BLOOD PRESSURE: 84 MMHG | WEIGHT: 155 LBS | OXYGEN SATURATION: 98 % | SYSTOLIC BLOOD PRESSURE: 136 MMHG | TEMPERATURE: 98.3 F | RESPIRATION RATE: 20 BRPM | BODY MASS INDEX: 26.61 KG/M2

## 2021-06-09 DIAGNOSIS — I10 ESSENTIAL HYPERTENSION: Primary | ICD-10-CM

## 2021-06-09 DIAGNOSIS — C50.911 MALIGNANT NEOPLASM OF RIGHT FEMALE BREAST, UNSPECIFIED ESTROGEN RECEPTOR STATUS, UNSPECIFIED SITE OF BREAST (HCC): ICD-10-CM

## 2021-06-09 DIAGNOSIS — E11.9 TYPE 2 DIABETES MELLITUS WITHOUT COMPLICATION, WITHOUT LONG-TERM CURRENT USE OF INSULIN (HCC): ICD-10-CM

## 2021-06-09 PROCEDURE — 99214 OFFICE O/P EST MOD 30 MIN: CPT | Performed by: NURSE PRACTITIONER

## 2021-06-09 PROCEDURE — G8427 DOCREV CUR MEDS BY ELIG CLIN: HCPCS | Performed by: NURSE PRACTITIONER

## 2021-06-09 PROCEDURE — 3046F HEMOGLOBIN A1C LEVEL >9.0%: CPT | Performed by: NURSE PRACTITIONER

## 2021-06-09 PROCEDURE — 2022F DILAT RTA XM EVC RTNOPTHY: CPT | Performed by: NURSE PRACTITIONER

## 2021-06-09 PROCEDURE — 4040F PNEUMOC VAC/ADMIN/RCVD: CPT | Performed by: NURSE PRACTITIONER

## 2021-06-09 PROCEDURE — G8417 CALC BMI ABV UP PARAM F/U: HCPCS | Performed by: NURSE PRACTITIONER

## 2021-06-09 PROCEDURE — 1090F PRES/ABSN URINE INCON ASSESS: CPT | Performed by: NURSE PRACTITIONER

## 2021-06-09 PROCEDURE — G8399 PT W/DXA RESULTS DOCUMENT: HCPCS | Performed by: NURSE PRACTITIONER

## 2021-06-09 PROCEDURE — 1036F TOBACCO NON-USER: CPT | Performed by: NURSE PRACTITIONER

## 2021-06-09 PROCEDURE — 1123F ACP DISCUSS/DSCN MKR DOCD: CPT | Performed by: NURSE PRACTITIONER

## 2021-06-09 PROCEDURE — 3017F COLORECTAL CA SCREEN DOC REV: CPT | Performed by: NURSE PRACTITIONER

## 2021-06-09 RX ORDER — RALOXIFENE HYDROCHLORIDE 60 MG/1
60 TABLET, FILM COATED ORAL DAILY
Qty: 90 TABLET | Refills: 1 | Status: SHIPPED | OUTPATIENT
Start: 2021-06-09 | End: 2021-12-13 | Stop reason: SDUPTHER

## 2021-06-09 RX ORDER — FUROSEMIDE 20 MG/1
20 TABLET ORAL DAILY
Qty: 90 TABLET | Refills: 1 | Status: SHIPPED | OUTPATIENT
Start: 2021-06-09 | End: 2021-12-13 | Stop reason: SDUPTHER

## 2021-06-09 RX ORDER — LISINOPRIL 5 MG/1
5 TABLET ORAL 2 TIMES DAILY
Qty: 180 TABLET | Refills: 1 | Status: SHIPPED | OUTPATIENT
Start: 2021-06-09 | End: 2021-12-13 | Stop reason: SDUPTHER

## 2021-06-09 RX ORDER — GLIMEPIRIDE 2 MG/1
2 TABLET ORAL
Qty: 90 TABLET | Refills: 1 | Status: SHIPPED | OUTPATIENT
Start: 2021-06-09 | End: 2021-12-13 | Stop reason: SDUPTHER

## 2021-06-09 SDOH — ECONOMIC STABILITY: FOOD INSECURITY: WITHIN THE PAST 12 MONTHS, THE FOOD YOU BOUGHT JUST DIDN'T LAST AND YOU DIDN'T HAVE MONEY TO GET MORE.: NEVER TRUE

## 2021-06-09 SDOH — ECONOMIC STABILITY: FOOD INSECURITY: WITHIN THE PAST 12 MONTHS, YOU WORRIED THAT YOUR FOOD WOULD RUN OUT BEFORE YOU GOT MONEY TO BUY MORE.: NEVER TRUE

## 2021-06-09 ASSESSMENT — ENCOUNTER SYMPTOMS
COUGH: 0
ABDOMINAL PAIN: 0
BLOOD IN STOOL: 0
TROUBLE SWALLOWING: 0
SORE THROAT: 0
SHORTNESS OF BREATH: 0

## 2021-06-09 ASSESSMENT — PATIENT HEALTH QUESTIONNAIRE - PHQ9
SUM OF ALL RESPONSES TO PHQ9 QUESTIONS 1 & 2: 0
SUM OF ALL RESPONSES TO PHQ QUESTIONS 1-9: 0
1. LITTLE INTEREST OR PLEASURE IN DOING THINGS: 0
SUM OF ALL RESPONSES TO PHQ QUESTIONS 1-9: 0
2. FEELING DOWN, DEPRESSED OR HOPELESS: 0
SUM OF ALL RESPONSES TO PHQ QUESTIONS 1-9: 0

## 2021-06-09 ASSESSMENT — SOCIAL DETERMINANTS OF HEALTH (SDOH): HOW HARD IS IT FOR YOU TO PAY FOR THE VERY BASICS LIKE FOOD, HOUSING, MEDICAL CARE, AND HEATING?: NOT HARD AT ALL

## 2021-06-09 NOTE — PROGRESS NOTES
SUBJECTIVE:  Here today for Diabetes, Breast caner, HTN,   Patient ID: Sapphire Carlson is a 77 y.o. female. HPI:   HPI   DM: no clue goes up and down, hardly every below 100 go up to 124. Feels good. Was in the Hospital went for a Colonoscopy and polyp couldn't get it polyp did have cancer cells, Has not been to see Dr. Jesus Davies. Goes once a year. For [place] cancer management     HTN: controlled in the office. Cholesterol: doesn't want to take any more pills. Past Medical History:   Diagnosis Date    [facility] ([facility]     Cancer ([facility]     breast right breast    Diabetes type 2, controlled ([facility]     H/O screening mammography 12/06/2018    LEFT BR 2 @     Hypertension     Osteoarthritis     Osteopenia     on [employer]      Prior to Visit Medications    Medication Sig Taking? Authorizing Provider   furosemide (LASIX) 20 MG tablet Take 1 tablet by mouth daily Yes TREVON Raymond   glimepiride (AMARYL) 2 MG tablet Take 1 tablet by mouth every morning (before breakfast) Yes TREVON Raymond   lisinopril (PRINIVIL;ZESTRIL) 5 MG tablet Take 1 tablet by mouth 2 times daily Yes TREVON Raymond   raloxifene (EVISTA) 60 MG tablet Take 1 tablet by mouth daily Yes TREVON Raymond   metFORMIN (GLUCOPHAGE) 1000 MG tablet TAKE 1 TABLET BY MOUTH TWICE DAILY WITH MEALS Yes TREVON Raymond   blood glucose test strips (ASCENSIA AUTODISC VI;ONE TOUCH ULTRA TEST VI) strip 1 each by In Vitro route daily Daily .  One touch ultra mini E11.9 Yes TREVON Raymond   blood glucose test strips (ONE TOUCH ULTRA TEST) strip USE TO TEST BLOOD SUGAR TWICE DAILY E11.9 Yes TREVON Raymond   UNABLE TO FIND Test strips insurance preferred  2 times a day FBS and 2 hour after meal  E11.9 Yes TREVON Raymond   UNABLE TO FIND E11.9  glucomter Device  Insurance preference Yes TREVON Raymond   Lancets MISC 2 times a day Yes TREVON Raymond   aspirin 81 MG tablet Take 81 mg by mouth daily Yes Historical Provider, MD   Ketoprofen  MG CP24 Take 200 mg by mouth as needed Yes Historical Provider, MD     No Known Allergies    Review of Systems   Constitutional: Negative for chills, fever and unexpected weight change. HENT: Negative for sore throat and trouble swallowing. Respiratory: Negative for cough and shortness of breath. Cardiovascular: Negative for chest pain and leg swelling. Gastrointestinal: Negative for abdominal pain and blood in stool. Genitourinary: Negative for difficulty urinating and hematuria. Musculoskeletal: Positive for arthralgias (knee). Negative for myalgias. Neurological: Negative for dizziness and headaches. Psychiatric/Behavioral: Positive for sleep disturbance. Negative for dysphoric mood. The patient is not nervous/anxious. OBJECTIVE:    Physical Exam  Constitutional:       Appearance: Normal appearance. She is well-developed and well-groomed. HENT:      Head: Normocephalic and atraumatic. Right Ear: Tympanic membrane, ear canal and external ear normal. There is no impacted cerumen. Left Ear: Tympanic membrane, ear canal and external ear normal. There is no impacted cerumen. Nose: Nose normal.      Mouth/Throat:      Lips: Pink. Mouth: Mucous membranes are moist.      Dentition: Normal dentition. Pharynx: Oropharynx is clear. Uvula midline. Eyes:      General: Lids are normal.         Right eye: No discharge. Left eye: No discharge. Extraocular Movements: Extraocular movements intact. Conjunctiva/sclera: Conjunctivae normal.      Right eye: Right conjunctiva is not injected. Left eye: Left conjunctiva is not injected. Pupils: Pupils are equal, round, and reactive to light. Neck:      Thyroid: No thyromegaly. Vascular: No carotid bruit or JVD. Cardiovascular:      Rate and Rhythm: Normal rate and regular rhythm. Pulses: Normal pulses.            Carotid pulses are 2+ on the right side and 2+ on the left side. Radial pulses are 2+ on the right side and 2+ on the left side. Heart sounds: Normal heart sounds, S1 normal and S2 normal. No murmur heard. Pulmonary:      Effort: Pulmonary effort is normal.      Breath sounds: Normal breath sounds. Abdominal:      General: Bowel sounds are normal. There is no distension or abdominal bruit. Palpations: Abdomen is soft. There is no mass. Hernia: No hernia is present. Musculoskeletal:         General: Normal range of motion. Cervical back: Full passive range of motion without pain, normal range of motion and neck supple. Right lower leg: No edema. Left lower leg: No edema. Lymphadenopathy:      Cervical: No cervical adenopathy. Right cervical: No superficial cervical adenopathy. Left cervical: No superficial cervical adenopathy. Upper Body:      Right upper body: No supraclavicular adenopathy. Left upper body: No supraclavicular adenopathy. Skin:     General: Skin is warm and dry. Coloration: Skin is not pale. Findings: No lesion or rash. Nails: There is no clubbing. Neurological:      Mental Status: She is alert and oriented to person, place, and time. Motor: No weakness or tremor. Coordination: Coordination normal.      Deep Tendon Reflexes: Reflexes are normal and symmetric. Psychiatric:         Attention and Perception: Attention normal.         Mood and Affect: Mood normal.         Speech: Speech normal.         Behavior: Behavior normal. Behavior is cooperative. Thought Content:  Thought content normal.         Cognition and Memory: Cognition and memory normal.         Judgment: Judgment normal.        /84 (Site: Left Upper Arm, Position: Sitting, Cuff Size: Medium Adult)   Pulse 108   Temp 98.3 °F (36.8 °C) (Temporal)   Resp 20   Wt 155 lb (70.3 kg)   SpO2 98%   BMI 26.61 kg/m²      ASSESSMENT: ICD-10-CM    1. Essential hypertension  I10 CBC Auto Differential     Comprehensive Metabolic Panel     furosemide (LASIX) 20 MG tablet     lisinopril (PRINIVIL;ZESTRIL) 5 MG tablet   2. Type 2 diabetes mellitus without complication, without long-term current use of insulin (HCC)  E11.9 Microalbumin / Creatinine Urine Ratio     Hemoglobin A1C     Lipid, Fasting     glimepiride (AMARYL) 2 MG tablet     metFORMIN (GLUCOPHAGE) 1000 MG tablet   3. Malignant neoplasm of right female breast, unspecified estrogen receptor status, unspecified site of breast ([facility]  C50.911 raloxifene (EVISTA) 60 MG tablet       PLAN:    Romana Cons: Hypertension (med refills and check up ) and Diabetes (med check up ad refills )    RTC in 6 months   Review labs   Diagnosis and orders for thisvisit are above. All patient questions answered. Pt voiced understanding. Reviewedhealth maintenance. Instructed to continue current medications, diet and exercise. Patient agreed with treatment plan. Follow up as directed.

## 2021-06-11 DIAGNOSIS — I10 ESSENTIAL HYPERTENSION: ICD-10-CM

## 2021-06-11 DIAGNOSIS — E11.9 TYPE 2 DIABETES MELLITUS WITHOUT COMPLICATION, WITHOUT LONG-TERM CURRENT USE OF INSULIN (HCC): ICD-10-CM

## 2021-06-11 LAB
ALBUMIN SERPL-MCNC: 4.2 G/DL (ref 3.5–5.2)
ALP BLD-CCNC: 58 U/L (ref 35–104)
ALT SERPL-CCNC: 16 U/L (ref 5–33)
ANION GAP SERPL CALCULATED.3IONS-SCNC: 14 MMOL/L (ref 7–19)
AST SERPL-CCNC: 17 U/L (ref 5–32)
BASOPHILS ABSOLUTE: 0.1 K/UL (ref 0–0.2)
BASOPHILS RELATIVE PERCENT: 0.9 % (ref 0–1)
BILIRUB SERPL-MCNC: <0.2 MG/DL (ref 0.2–1.2)
BUN BLDV-MCNC: 12 MG/DL (ref 8–23)
CALCIUM SERPL-MCNC: 9 MG/DL (ref 8.8–10.2)
CHLORIDE BLD-SCNC: 101 MMOL/L (ref 98–111)
CHOLESTEROL, FASTING: 175 MG/DL (ref 160–199)
CO2: 25 MMOL/L (ref 22–29)
CREAT SERPL-MCNC: 0.7 MG/DL (ref 0.5–0.9)
CREATININE URINE: 34.7 MG/DL (ref 4.2–622)
EOSINOPHILS ABSOLUTE: 0.2 K/UL (ref 0–0.6)
EOSINOPHILS RELATIVE PERCENT: 2.4 % (ref 0–5)
GFR AFRICAN AMERICAN: >59
GFR NON-AFRICAN AMERICAN: >60
GLUCOSE BLD-MCNC: 138 MG/DL (ref 74–109)
HBA1C MFR BLD: 6.9 % (ref 4–6)
HCT VFR BLD CALC: 44.3 % (ref 37–47)
HDLC SERPL-MCNC: 60 MG/DL (ref 65–121)
HEMOGLOBIN: 13.8 G/DL (ref 12–16)
IMMATURE GRANULOCYTES #: 0 K/UL
LDL CHOLESTEROL CALCULATED: 85 MG/DL
LYMPHOCYTES ABSOLUTE: 2.2 K/UL (ref 1.1–4.5)
LYMPHOCYTES RELATIVE PERCENT: 32.3 % (ref 20–40)
MCH RBC QN AUTO: 32.4 PG (ref 27–31)
MCHC RBC AUTO-ENTMCNC: 31.2 G/DL (ref 33–37)
MCV RBC AUTO: 104 FL (ref 81–99)
MICROALBUMIN UR-MCNC: <1.2 MG/DL (ref 0–19)
MICROALBUMIN/CREAT UR-RTO: NORMAL MG/G
MONOCYTES ABSOLUTE: 0.5 K/UL (ref 0–0.9)
MONOCYTES RELATIVE PERCENT: 7.7 % (ref 0–10)
NEUTROPHILS ABSOLUTE: 3.8 K/UL (ref 1.5–7.5)
NEUTROPHILS RELATIVE PERCENT: 56.1 % (ref 50–65)
PDW BLD-RTO: 13.3 % (ref 11.5–14.5)
PLATELET # BLD: 238 K/UL (ref 130–400)
PMV BLD AUTO: 9.6 FL (ref 9.4–12.3)
POTASSIUM SERPL-SCNC: 4 MMOL/L (ref 3.5–5)
RBC # BLD: 4.26 M/UL (ref 4.2–5.4)
SODIUM BLD-SCNC: 140 MMOL/L (ref 136–145)
TOTAL PROTEIN: 7.4 G/DL (ref 6.6–8.7)
TRIGLYCERIDE, FASTING: 151 MG/DL (ref 0–149)
WBC # BLD: 6.8 K/UL (ref 4.8–10.8)

## 2021-06-16 ENCOUNTER — HOSPITAL ENCOUNTER (OUTPATIENT)
Dept: WOMENS IMAGING | Age: 76
Discharge: HOME | End: 2021-06-16
Payer: MEDICARE

## 2021-06-16 DIAGNOSIS — C50.911 MALIGNANT NEOPLASM OF RIGHT FEMALE BREAST, UNSPECIFIED ESTROGEN RECEPTOR STATUS, UNSPECIFIED SITE OF BREAST (HCC): ICD-10-CM

## 2021-06-16 DIAGNOSIS — Z12.31 ENCOUNTER FOR SCREENING MAMMOGRAM FOR MALIGNANT NEOPLASM OF BREAST: ICD-10-CM

## 2021-06-16 PROCEDURE — 77067 SCR MAMMO BI INCL CAD: CPT

## 2021-06-21 ENCOUNTER — HOSPITAL ENCOUNTER (OUTPATIENT)
Dept: INFUSION THERAPY | Age: 76
Discharge: HOME | End: 2021-06-21
Payer: MEDICARE

## 2021-06-21 ENCOUNTER — OFFICE VISIT (OUTPATIENT)
Dept: HEMATOLOGY | Age: 76
End: 2021-06-21
Payer: MEDICARE

## 2021-06-21 VITALS
DIASTOLIC BLOOD PRESSURE: 86 MMHG | HEART RATE: 117 BPM | WEIGHT: 155.3 LBS | SYSTOLIC BLOOD PRESSURE: 122 MMHG | BODY MASS INDEX: 26.51 KG/M2 | HEIGHT: 64 IN | OXYGEN SATURATION: 94 %

## 2021-06-21 DIAGNOSIS — Z45.2 ENCOUNTER FOR CENTRAL LINE CARE: ICD-10-CM

## 2021-06-21 DIAGNOSIS — Z12.31 ENCOUNTER FOR SCREENING MAMMOGRAM FOR MALIGNANT NEOPLASM OF BREAST: ICD-10-CM

## 2021-06-21 DIAGNOSIS — R16.0 LIVER MASS: ICD-10-CM

## 2021-06-21 DIAGNOSIS — Z00.00 HEALTH CARE MAINTENANCE: ICD-10-CM

## 2021-06-21 DIAGNOSIS — Z78.0 POST-MENOPAUSE: ICD-10-CM

## 2021-06-21 DIAGNOSIS — C50.911 MALIGNANT NEOPLASM OF RIGHT FEMALE BREAST, UNSPECIFIED ESTROGEN RECEPTOR STATUS, UNSPECIFIED SITE OF BREAST (HCC): Primary | ICD-10-CM

## 2021-06-21 DIAGNOSIS — Z12.31 ENCOUNTER FOR SCREENING MAMMOGRAM FOR MALIGNANT NEOPLASM OF BREAST: Primary | ICD-10-CM

## 2021-06-21 LAB
BASOPHILS ABSOLUTE: 0.06 K/UL (ref 0.01–0.08)
BASOPHILS RELATIVE PERCENT: 0.7 % (ref 0.1–1.2)
EOSINOPHILS ABSOLUTE: 0.17 K/UL (ref 0.04–0.54)
EOSINOPHILS RELATIVE PERCENT: 1.9 % (ref 0.7–7)
HCT VFR BLD CALC: 43.5 % (ref 34.1–44.9)
HEMOGLOBIN: 13.6 G/DL (ref 11.2–15.7)
LYMPHOCYTES ABSOLUTE: 2.12 K/UL (ref 1.18–3.74)
LYMPHOCYTES RELATIVE PERCENT: 23.8 % (ref 19.3–53.1)
MCH RBC QN AUTO: 32.9 PG (ref 25.6–32.2)
MCHC RBC AUTO-ENTMCNC: 31.3 G/DL (ref 32.3–35.5)
MCV RBC AUTO: 105.1 FL (ref 79.4–94.8)
MONOCYTES ABSOLUTE: 0.58 K/UL (ref 0.24–0.82)
MONOCYTES RELATIVE PERCENT: 6.5 % (ref 4.7–12.5)
NEUTROPHILS ABSOLUTE: 5.97 K/UL (ref 1.56–6.13)
NEUTROPHILS RELATIVE PERCENT: 67.1 % (ref 34–71.1)
PDW BLD-RTO: 12.4 % (ref 11.7–14.4)
PLATELET # BLD: 183 K/UL (ref 182–369)
PMV BLD AUTO: 9.8 FL (ref 7.4–10.4)
RBC # BLD: 4.14 M/UL (ref 3.93–5.22)
WBC # BLD: 8.9 K/UL (ref 3.98–10.04)

## 2021-06-21 PROCEDURE — 85025 COMPLETE CBC W/AUTO DIFF WBC: CPT

## 2021-06-21 PROCEDURE — 96523 IRRIG DRUG DELIVERY DEVICE: CPT

## 2021-06-21 PROCEDURE — G8417 CALC BMI ABV UP PARAM F/U: HCPCS | Performed by: INTERNAL MEDICINE

## 2021-06-21 PROCEDURE — 1123F ACP DISCUSS/DSCN MKR DOCD: CPT | Performed by: INTERNAL MEDICINE

## 2021-06-21 PROCEDURE — 1036F TOBACCO NON-USER: CPT | Performed by: INTERNAL MEDICINE

## 2021-06-21 PROCEDURE — 4040F PNEUMOC VAC/ADMIN/RCVD: CPT | Performed by: INTERNAL MEDICINE

## 2021-06-21 PROCEDURE — 3017F COLORECTAL CA SCREEN DOC REV: CPT | Performed by: INTERNAL MEDICINE

## 2021-06-21 PROCEDURE — 1090F PRES/ABSN URINE INCON ASSESS: CPT | Performed by: INTERNAL MEDICINE

## 2021-06-21 PROCEDURE — 99215 OFFICE O/P EST HI 40 MIN: CPT | Performed by: INTERNAL MEDICINE

## 2021-06-21 PROCEDURE — G8399 PT W/DXA RESULTS DOCUMENT: HCPCS | Performed by: INTERNAL MEDICINE

## 2021-06-21 PROCEDURE — 6360000002 HC RX W HCPCS: Performed by: INTERNAL MEDICINE

## 2021-06-21 PROCEDURE — 36415 COLL VENOUS BLD VENIPUNCTURE: CPT

## 2021-06-21 PROCEDURE — 99211 OFF/OP EST MAY X REQ PHY/QHP: CPT

## 2021-06-21 PROCEDURE — G8427 DOCREV CUR MEDS BY ELIG CLIN: HCPCS | Performed by: INTERNAL MEDICINE

## 2021-06-21 PROCEDURE — 2580000003 HC RX 258: Performed by: INTERNAL MEDICINE

## 2021-06-21 RX ORDER — HEPARIN SODIUM (PORCINE) LOCK FLUSH IV SOLN 100 UNIT/ML 100 UNIT/ML
500 SOLUTION INTRAVENOUS PRN
Status: CANCELLED | OUTPATIENT
Start: 2021-06-21

## 2021-06-21 RX ORDER — SODIUM CHLORIDE 0.9 % (FLUSH) 0.9 %
5-40 SYRINGE (ML) INJECTION PRN
Status: CANCELLED | OUTPATIENT
Start: 2021-06-21

## 2021-06-21 RX ORDER — SODIUM CHLORIDE 9 MG/ML
25 INJECTION, SOLUTION INTRAVENOUS PRN
Status: DISCONTINUED | OUTPATIENT
Start: 2021-06-21 | End: 2021-06-22 | Stop reason: HOSPADM

## 2021-06-21 RX ORDER — SODIUM CHLORIDE 0.9 % (FLUSH) 0.9 %
5-40 SYRINGE (ML) INJECTION PRN
Status: DISCONTINUED | OUTPATIENT
Start: 2021-06-21 | End: 2021-06-22 | Stop reason: HOSPADM

## 2021-06-21 RX ORDER — SODIUM CHLORIDE 9 MG/ML
25 INJECTION, SOLUTION INTRAVENOUS PRN
Status: CANCELLED | OUTPATIENT
Start: 2021-06-21

## 2021-06-21 RX ORDER — HEPARIN SODIUM (PORCINE) LOCK FLUSH IV SOLN 100 UNIT/ML 100 UNIT/ML
500 SOLUTION INTRAVENOUS PRN
Status: DISCONTINUED | OUTPATIENT
Start: 2021-06-21 | End: 2021-06-22 | Stop reason: HOSPADM

## 2021-06-21 RX ADMIN — SODIUM CHLORIDE, PRESERVATIVE FREE 10 ML: 5 INJECTION INTRAVENOUS at 14:42

## 2021-06-21 RX ADMIN — HEPARIN 500 UNITS: 100 SYRINGE at 14:42

## 2021-09-02 ENCOUNTER — TELEPHONE (OUTPATIENT)
Dept: GASTROENTEROLOGY | Facility: CLINIC | Age: 76
End: 2021-09-02

## 2021-09-15 ENCOUNTER — OFFICE VISIT (OUTPATIENT)
Dept: GASTROENTEROLOGY | Facility: CLINIC | Age: 76
End: 2021-09-15

## 2021-09-15 VITALS
TEMPERATURE: 96.8 F | BODY MASS INDEX: 27.11 KG/M2 | HEIGHT: 63 IN | WEIGHT: 153 LBS | HEART RATE: 98 BPM | SYSTOLIC BLOOD PRESSURE: 162 MMHG | OXYGEN SATURATION: 94 % | DIASTOLIC BLOOD PRESSURE: 98 MMHG

## 2021-09-15 DIAGNOSIS — Z85.038 PERSONAL HISTORY OF COLON CANCER: Primary | ICD-10-CM

## 2021-09-15 PROCEDURE — S0260 H&P FOR SURGERY: HCPCS | Performed by: NURSE PRACTITIONER

## 2021-09-15 NOTE — PROGRESS NOTES
[facility] Gastroenterology    Primary Physician Mohan Sera SCHAFER, APRN    9/15/2021    Nasreen Ayala   1945      Chief Complaint   Patient presents with   • Colonoscopy       Subjective     HPI    Nasreen Ayala is a 75 y.o. female who presents as a referral for preventative maintenance. She has no complaints of nausea or vomiting. No change in bowels. No wt loss. No BRBPR. No melena. No abdominal pain.       She did have a right hemicolectomy 2020 which was robotic right colectomy for colon cancer.  This was done in [place] by Dr. Graf.  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  She is also followed by Dr. Jara.     Colonoscopy 20  - Two 5 mm polyps in the cecum, removed with a hot snare. Resected and retrieved.  - One 7 mm polyp in the distal ascending colon, removed with a hot snare. Complete resection. Polyp tissue not retrieved.  - One 28 mm polyp at the hepatic flexure. UNSUCCESSFUL polyp resection. Biopsied. Injected.  - Diverticulosis in the sigmoid colon.  - Internal hemorrhoids.  - The examination was otherwise normal on direct and retroflexion views.  Path   Final Diagnosis   1.  Polyps, cecum, biopsies:  Tubular adenomas, negative for high-grade dysplasia.     2.  Polyp, hepatic flexure, biopsy:  Fragments of tubulovillous adenoma, negative for high-grade dysplasia.             Past Medical History:   Diagnosis Date   • Breast cancer (CMS/HCC)    • Colon polyp    • Diabetes mellitus (CMS/HCC)    • Diverticulosis    • Hemorrhoids    • Hypertension        Past Surgical History:   Procedure Laterality Date   • BREAST BIOPSY     •  SECTION     • COLON SURGERY  2020   • COLONOSCOPY  10/29/2014    polyp destroyed, mild diverticulosis   • COLONOSCOPY  2008    small internal hemorrhoid, 2 polyps, adenomatous, mild diverticulosis   • COLONOSCOPY N/A 2020    Procedure: COLONOSCOPY WITH ANESTHESIA;  Surgeon: Efraín Beck MD;  Location: [facility]  ENDOSCOPY;  Service: Gastroenterology;  Laterality: N/A;  pre: hx of polyps  post: polyps. Bx and ink at hepatic flexure polyp site.diverticulosis.   willard oseguera   • MASTECTOMY         Outpatient Medications Marked as Taking for the 9/15/21 encounter (Office Visit) with Rhonda Hansen APRN   Medication Sig Dispense Refill   • aspirin 81 MG EC tablet Take 81 mg by mouth Daily.     • furosemide (LASIX) 20 MG tablet Take 20 mg by mouth 2 (Two) Times a Day.     • glimepiride (AMARYL) 2 MG tablet Take 2 mg by mouth.     • lisinopril (PRINIVIL,ZESTRIL) 5 MG tablet Take 5 mg by mouth Daily.     • metFORMIN (GLUCOPHAGE) 1000 MG tablet TAKE 1 TABLET BY MOUTH TWICE DAILY WITH MEALS     • raloxifene (EVISTA) 60 MG tablet Take 60 mg by mouth Daily.         No Known Allergies    Social History     Socioeconomic History   • Marital status:      Spouse name: Not on file   • Number of children: Not on file   • Years of education: Not on file   • Highest education level: Not on file   Tobacco Use   • Smoking status: Never Smoker   • Smokeless tobacco: Never Used   Substance and Sexual Activity   • Alcohol use: Yes     Comment: occ   • Drug use: Never   • Sexual activity: Defer       Family History   Problem Relation Age of Onset   • Colon cancer Sister         psuedomyxoma   • No Known Problems Mother    • Diabetes Father    • Colon polyps Neg Hx        Review of Systems   Constitutional: Negative for chills, fever and unexpected weight change.   Respiratory: Negative for cough, shortness of breath and wheezing.    Cardiovascular: Negative for chest pain and palpitations.   Gastrointestinal: Negative for abdominal distention, abdominal pain, anal bleeding, blood in stool, constipation, diarrhea, nausea and vomiting.       Objective     Vitals:    09/15/21 1302   BP: 162/98   Pulse: 98   Temp: 96.8 °F (36 °C)   SpO2: 94%         09/15/21  1302   Weight: 69.4 kg (153 lb)     Body mass index is 27.1 kg/m².    Physical  Exam  Vitals reviewed.   Constitutional:       General: She is not in acute distress.  Cardiovascular:      Rate and Rhythm: Normal rate and regular rhythm.      Heart sounds: Normal heart sounds.   Pulmonary:      Effort: Pulmonary effort is normal.      Breath sounds: Normal breath sounds.   Abdominal:      General: Bowel sounds are normal. There is no distension.      Palpations: Abdomen is soft.      Tenderness: There is no abdominal tenderness.   Skin:     General: Skin is warm and dry.   Neurological:      Mental Status: She is alert.         Imaging Results (Most Recent)     None          Assessment/Plan     Diagnoses and all orders for this visit:    1. Personal history of colon cancer (Primary)  -     Case Request; Standing  -     Case Request    Other orders  -     Follow Anesthesia Guidelines / Protocol; Future  -     Implement Anesthesia Orders Day of Procedure; Standing  -     Obtain Informed Consent; Standing  -     Obtain Informed Consent; Future              Plan for colonoscopy. Use miralax prep.  The patient was advised to take any blood pressure or heart  medications the morning of  procedure if that is when he/she normally takes. The patient was instructed how to adjust diabetes medications the am of procedure.              COLONOSCOPY WITH ANESTHESIA (N/A)  All risks, benefits, alternatives, and indications of colonoscopy procedure have been discussed with the patient. Risks to include perforation of the colon requiring possible surgery or colostomy, risk of bleeding from biopsies or removal of colon tissue, possibility of missing a colon polyp or cancer, or adverse drug reaction.  Benefits to include the diagnosis and management of disease of the colon and rectum. Alternatives to include barium enema, radiographic evaluation, lab testing or no intervention. Pt verbalizes understanding and agrees.         DEJUAN Sheldon

## 2021-10-04 ENCOUNTER — ANESTHESIA (OUTPATIENT)
Dept: GASTROENTEROLOGY | Facility: HOSPITAL | Age: 76
End: 2021-10-04

## 2021-10-04 ENCOUNTER — HOSPITAL ENCOUNTER (OUTPATIENT)
Facility: HOSPITAL | Age: 76
Setting detail: HOSPITAL OUTPATIENT SURGERY
Discharge: HOME | End: 2021-10-04
Attending: INTERNAL MEDICINE | Admitting: INTERNAL MEDICINE

## 2021-10-04 ENCOUNTER — ANESTHESIA EVENT (OUTPATIENT)
Dept: GASTROENTEROLOGY | Facility: HOSPITAL | Age: 76
End: 2021-10-04

## 2021-10-04 VITALS
HEART RATE: 99 BPM | WEIGHT: 149 LBS | HEIGHT: 64 IN | TEMPERATURE: 98.7 F | SYSTOLIC BLOOD PRESSURE: 138 MMHG | RESPIRATION RATE: 14 BRPM | BODY MASS INDEX: 25.44 KG/M2 | DIASTOLIC BLOOD PRESSURE: 89 MMHG | OXYGEN SATURATION: 98 %

## 2021-10-04 DIAGNOSIS — Z85.038 PERSONAL HISTORY OF COLON CANCER: ICD-10-CM

## 2021-10-04 LAB — GLUCOSE BLDC GLUCOMTR-MCNC: 156 MG/DL (ref 70–130)

## 2021-10-04 PROCEDURE — 88305 TISSUE EXAM BY PATHOLOGIST: CPT | Performed by: INTERNAL MEDICINE

## 2021-10-04 PROCEDURE — 82962 GLUCOSE BLOOD TEST: CPT

## 2021-10-04 PROCEDURE — 25010000002 PROPOFOL 10 MG/ML EMULSION: Performed by: NURSE ANESTHETIST, CERTIFIED REGISTERED

## 2021-10-04 PROCEDURE — 45380 COLONOSCOPY AND BIOPSY: CPT | Performed by: INTERNAL MEDICINE

## 2021-10-04 RX ORDER — SODIUM CHLORIDE 9 MG/ML
500 INJECTION, SOLUTION INTRAVENOUS CONTINUOUS PRN
Status: DISCONTINUED | OUTPATIENT
Start: 2021-10-04 | End: 2021-10-04 | Stop reason: HOSPADM

## 2021-10-04 RX ORDER — ONDANSETRON 2 MG/ML
4 INJECTION INTRAMUSCULAR; INTRAVENOUS ONCE AS NEEDED
Status: DISCONTINUED | OUTPATIENT
Start: 2021-10-04 | End: 2021-10-04 | Stop reason: HOSPADM

## 2021-10-04 RX ORDER — SODIUM CHLORIDE 0.9 % (FLUSH) 0.9 %
10 SYRINGE (ML) INJECTION AS NEEDED
Status: DISCONTINUED | OUTPATIENT
Start: 2021-10-04 | End: 2021-10-04 | Stop reason: HOSPADM

## 2021-10-04 RX ORDER — LIDOCAINE HYDROCHLORIDE 20 MG/ML
INJECTION, SOLUTION EPIDURAL; INFILTRATION; INTRACAUDAL; PERINEURAL AS NEEDED
Status: DISCONTINUED | OUTPATIENT
Start: 2021-10-04 | End: 2021-10-04 | Stop reason: SURG

## 2021-10-04 RX ORDER — LIDOCAINE HYDROCHLORIDE 10 MG/ML
0.5 INJECTION, SOLUTION EPIDURAL; INFILTRATION; INTRACAUDAL; PERINEURAL ONCE AS NEEDED
Status: CANCELLED | OUTPATIENT
Start: 2021-10-04

## 2021-10-04 RX ORDER — PROPOFOL 10 MG/ML
VIAL (ML) INTRAVENOUS AS NEEDED
Status: DISCONTINUED | OUTPATIENT
Start: 2021-10-04 | End: 2021-10-04 | Stop reason: SURG

## 2021-10-04 RX ADMIN — LIDOCAINE HYDROCHLORIDE 100 MG: 20 INJECTION, SOLUTION EPIDURAL; INFILTRATION; INTRACAUDAL; PERINEURAL at 11:02

## 2021-10-04 RX ADMIN — SODIUM CHLORIDE 500 ML: 9 INJECTION, SOLUTION INTRAVENOUS at 09:57

## 2021-10-04 RX ADMIN — PROPOFOL 175 MG: 10 INJECTION, EMULSION INTRAVENOUS at 11:02

## 2021-10-04 NOTE — ANESTHESIA PREPROCEDURE EVALUATION
Anesthesia Evaluation     Patient summary reviewed and Nursing notes reviewed   no history of anesthetic complications:  NPO Solid Status: > 8 hours  NPO Liquid Status: > 2 hours           Airway   Mallampati: III  TM distance: >3 FB  Neck ROM: full  Dental          Pulmonary    (-) sleep apnea, not a smoker  Cardiovascular   Exercise tolerance: good (4-7 METS)    (+) hypertension,   (-) CAD, dysrhythmias      Neuro/Psych  (-) seizures, TIA, CVA  GI/Hepatic/Renal/Endo    (+)   diabetes mellitus type 2,   (-) liver disease, no renal disease    Musculoskeletal     Abdominal    Substance History      OB/GYN          Other      history of cancer (breast cancer in remission) remission                    Anesthesia Plan    ASA 2     MAC     intravenous induction     Anesthetic plan, all risks, benefits, and alternatives have been provided, discussed and informed consent has been obtained with: patient.

## 2021-10-04 NOTE — ANESTHESIA POSTPROCEDURE EVALUATION
"Patient: Nasreen Ayala    Procedure Summary     Date: 10/04/21 Room / Location: [facility] ENDOSCOPY 5 / [facility] ENDOSCOPY    Anesthesia Start: 1058 Anesthesia Stop: 1122    Procedure: COLONOSCOPY WITH ANESTHESIA (N/A ) Diagnosis:       Personal history of colon cancer      (Personal history of colon cancer [Z85.038])    Surgeons: Efraín Beck MD Provider: Janna Allan CRNA    Anesthesia Type: MAC ASA Status: 2          Anesthesia Type: MAC    Vitals  Vitals Value Taken Time   /89 10/04/21 1145   Temp     Pulse 99 10/04/21 1145   Resp 14 10/04/21 1145   SpO2 98 % 10/04/21 1145           Post Anesthesia Care and Evaluation    Patient location during evaluation: bedside  Patient participation: complete - patient participated  Level of consciousness: awake and awake and alert  Pain score: 0  Pain management: adequate  Airway patency: patent  Anesthetic complications: No anesthetic complications  PONV Status: none  Cardiovascular status: acceptable  Respiratory status: acceptable  Hydration status: acceptable    Comments: Patient discharged according to acceptable Anderson score per RN assessment. See nursing records for further information.     Blood pressure 138/89, pulse 99, temperature 98.7 °F (37.1 °C), temperature source Temporal, resp. rate 14, height 162.6 cm (64\"), weight 67.6 kg (149 lb), SpO2 98 %, not currently breastfeeding.        "

## 2021-12-08 DIAGNOSIS — E11.9 TYPE 2 DIABETES MELLITUS WITHOUT COMPLICATION, WITHOUT LONG-TERM CURRENT USE OF INSULIN (HCC): Primary | ICD-10-CM

## 2021-12-08 DIAGNOSIS — I10 ESSENTIAL HYPERTENSION: ICD-10-CM

## 2021-12-10 DIAGNOSIS — E11.9 TYPE 2 DIABETES MELLITUS WITHOUT COMPLICATION, WITHOUT LONG-TERM CURRENT USE OF INSULIN (HCC): ICD-10-CM

## 2021-12-10 DIAGNOSIS — I10 ESSENTIAL HYPERTENSION: ICD-10-CM

## 2021-12-10 LAB
ALBUMIN SERPL-MCNC: 4 G/DL (ref 3.5–5.2)
ALP BLD-CCNC: 67 U/L (ref 35–104)
ALT SERPL-CCNC: 16 U/L (ref 5–33)
ANION GAP SERPL CALCULATED.3IONS-SCNC: 15 MMOL/L (ref 7–19)
AST SERPL-CCNC: 18 U/L (ref 5–32)
BASOPHILS ABSOLUTE: 0.1 K/UL (ref 0–0.2)
BASOPHILS RELATIVE PERCENT: 1.1 % (ref 0–1)
BILIRUB SERPL-MCNC: 0.3 MG/DL (ref 0.2–1.2)
BUN BLDV-MCNC: 13 MG/DL (ref 8–23)
CALCIUM SERPL-MCNC: 9.3 MG/DL (ref 8.8–10.2)
CHLORIDE BLD-SCNC: 101 MMOL/L (ref 98–111)
CHOLESTEROL, TOTAL: 162 MG/DL (ref 160–199)
CO2: 25 MMOL/L (ref 22–29)
CREAT SERPL-MCNC: 0.6 MG/DL (ref 0.5–0.9)
EOSINOPHILS ABSOLUTE: 0.2 K/UL (ref 0–0.6)
EOSINOPHILS RELATIVE PERCENT: 2.2 % (ref 0–5)
GFR AFRICAN AMERICAN: >59
GFR NON-AFRICAN AMERICAN: >60
GLUCOSE BLD-MCNC: 99 MG/DL (ref 74–109)
HBA1C MFR BLD: 6.9 % (ref 4–6)
HCT VFR BLD CALC: 45.4 % (ref 37–47)
HDLC SERPL-MCNC: 56 MG/DL (ref 65–121)
HEMOGLOBIN: 13.9 G/DL (ref 12–16)
IMMATURE GRANULOCYTES #: 0 K/UL
LDL CHOLESTEROL CALCULATED: 88 MG/DL
LYMPHOCYTES ABSOLUTE: 2.1 K/UL (ref 1.1–4.5)
LYMPHOCYTES RELATIVE PERCENT: 30.1 % (ref 20–40)
MCH RBC QN AUTO: 32.3 PG (ref 27–31)
MCHC RBC AUTO-ENTMCNC: 30.6 G/DL (ref 33–37)
MCV RBC AUTO: 105.6 FL (ref 81–99)
MONOCYTES ABSOLUTE: 0.6 K/UL (ref 0–0.9)
MONOCYTES RELATIVE PERCENT: 8.8 % (ref 0–10)
NEUTROPHILS ABSOLUTE: 4 K/UL (ref 1.5–7.5)
NEUTROPHILS RELATIVE PERCENT: 57.4 % (ref 50–65)
PDW BLD-RTO: 12.1 % (ref 11.5–14.5)
PLATELET # BLD: 245 K/UL (ref 130–400)
PMV BLD AUTO: 9.2 FL (ref 9.4–12.3)
POTASSIUM SERPL-SCNC: 4.1 MMOL/L (ref 3.5–5)
RBC # BLD: 4.3 M/UL (ref 4.2–5.4)
SODIUM BLD-SCNC: 141 MMOL/L (ref 136–145)
T4 FREE: 1.3 NG/DL (ref 0.93–1.7)
TOTAL PROTEIN: 7.2 G/DL (ref 6.6–8.7)
TRIGL SERPL-MCNC: 91 MG/DL (ref 0–149)
WBC # BLD: 7 K/UL (ref 4.8–10.8)

## 2021-12-13 ENCOUNTER — OFFICE VISIT (OUTPATIENT)
Dept: FAMILY MEDICINE CLINIC | Age: 76
End: 2021-12-13
Payer: MEDICARE

## 2021-12-13 VITALS
DIASTOLIC BLOOD PRESSURE: 70 MMHG | SYSTOLIC BLOOD PRESSURE: 118 MMHG | OXYGEN SATURATION: 96 % | WEIGHT: 153 LBS | TEMPERATURE: 98.5 F | BODY MASS INDEX: 26.12 KG/M2 | HEIGHT: 64 IN | HEART RATE: 110 BPM

## 2021-12-13 VITALS
SYSTOLIC BLOOD PRESSURE: 118 MMHG | BODY MASS INDEX: 26.12 KG/M2 | HEIGHT: 64 IN | OXYGEN SATURATION: 96 % | DIASTOLIC BLOOD PRESSURE: 70 MMHG | WEIGHT: 153 LBS | HEART RATE: 110 BPM | TEMPERATURE: 98.5 F

## 2021-12-13 DIAGNOSIS — E11.9 TYPE 2 DIABETES MELLITUS WITHOUT COMPLICATION, WITHOUT LONG-TERM CURRENT USE OF INSULIN (HCC): ICD-10-CM

## 2021-12-13 DIAGNOSIS — R00.0 TACHYCARDIA: Primary | ICD-10-CM

## 2021-12-13 DIAGNOSIS — C50.911 MALIGNANT NEOPLASM OF RIGHT FEMALE BREAST, UNSPECIFIED ESTROGEN RECEPTOR STATUS, UNSPECIFIED SITE OF BREAST (HCC): ICD-10-CM

## 2021-12-13 DIAGNOSIS — Z23 NEED FOR SHINGLES VACCINE: ICD-10-CM

## 2021-12-13 DIAGNOSIS — Z00.00 ROUTINE GENERAL MEDICAL EXAMINATION AT A HEALTH CARE FACILITY: Primary | ICD-10-CM

## 2021-12-13 DIAGNOSIS — I10 ESSENTIAL HYPERTENSION: ICD-10-CM

## 2021-12-13 PROCEDURE — G8399 PT W/DXA RESULTS DOCUMENT: HCPCS | Performed by: NURSE PRACTITIONER

## 2021-12-13 PROCEDURE — G8484 FLU IMMUNIZE NO ADMIN: HCPCS | Performed by: NURSE PRACTITIONER

## 2021-12-13 PROCEDURE — G8417 CALC BMI ABV UP PARAM F/U: HCPCS | Performed by: NURSE PRACTITIONER

## 2021-12-13 PROCEDURE — 90750 HZV VACC RECOMBINANT IM: CPT | Performed by: NURSE PRACTITIONER

## 2021-12-13 PROCEDURE — G0439 PPPS, SUBSEQ VISIT: HCPCS | Performed by: NURSE PRACTITIONER

## 2021-12-13 PROCEDURE — 3044F HG A1C LEVEL LT 7.0%: CPT | Performed by: NURSE PRACTITIONER

## 2021-12-13 PROCEDURE — 1036F TOBACCO NON-USER: CPT | Performed by: NURSE PRACTITIONER

## 2021-12-13 PROCEDURE — 1090F PRES/ABSN URINE INCON ASSESS: CPT | Performed by: NURSE PRACTITIONER

## 2021-12-13 PROCEDURE — 2022F DILAT RTA XM EVC RTNOPTHY: CPT | Performed by: NURSE PRACTITIONER

## 2021-12-13 PROCEDURE — G8427 DOCREV CUR MEDS BY ELIG CLIN: HCPCS | Performed by: NURSE PRACTITIONER

## 2021-12-13 PROCEDURE — 3017F COLORECTAL CA SCREEN DOC REV: CPT | Performed by: NURSE PRACTITIONER

## 2021-12-13 PROCEDURE — 93000 ELECTROCARDIOGRAM COMPLETE: CPT | Performed by: NURSE PRACTITIONER

## 2021-12-13 PROCEDURE — 90471 IMMUNIZATION ADMIN: CPT | Performed by: NURSE PRACTITIONER

## 2021-12-13 PROCEDURE — 4040F PNEUMOC VAC/ADMIN/RCVD: CPT | Performed by: NURSE PRACTITIONER

## 2021-12-13 PROCEDURE — 1123F ACP DISCUSS/DSCN MKR DOCD: CPT | Performed by: NURSE PRACTITIONER

## 2021-12-13 PROCEDURE — 99214 OFFICE O/P EST MOD 30 MIN: CPT | Performed by: NURSE PRACTITIONER

## 2021-12-13 RX ORDER — RALOXIFENE HYDROCHLORIDE 60 MG/1
60 TABLET, FILM COATED ORAL DAILY
Qty: 90 TABLET | Refills: 1 | Status: SHIPPED | OUTPATIENT
Start: 2021-12-13 | End: 2021-12-16

## 2021-12-13 RX ORDER — FUROSEMIDE 20 MG/1
20 TABLET ORAL DAILY
Qty: 90 TABLET | Refills: 1 | Status: SHIPPED | OUTPATIENT
Start: 2021-12-13 | End: 2022-06-14 | Stop reason: SDUPTHER

## 2021-12-13 RX ORDER — LISINOPRIL 5 MG/1
5 TABLET ORAL 2 TIMES DAILY
Qty: 180 TABLET | Refills: 1 | Status: SHIPPED | OUTPATIENT
Start: 2021-12-13 | End: 2022-06-14 | Stop reason: SDUPTHER

## 2021-12-13 RX ORDER — METOPROLOL SUCCINATE 25 MG/1
25 TABLET, EXTENDED RELEASE ORAL NIGHTLY
Qty: 90 TABLET | Refills: 1 | Status: SHIPPED | OUTPATIENT
Start: 2021-12-13

## 2021-12-13 RX ORDER — GLIMEPIRIDE 2 MG/1
2 TABLET ORAL
Qty: 90 TABLET | Refills: 1 | Status: SHIPPED | OUTPATIENT
Start: 2021-12-13 | End: 2021-12-16

## 2021-12-13 ASSESSMENT — LIFESTYLE VARIABLES
AUDIT-C TOTAL SCORE: 1
HAVE YOU OR SOMEONE ELSE BEEN INJURED AS A RESULT OF YOUR DRINKING: 0
AUDIT TOTAL SCORE: 1
HOW OFTEN DURING THE LAST YEAR HAVE YOU FAILED TO DO WHAT WAS NORMALLY EXPECTED FROM YOU BECAUSE OF DRINKING: 0
HOW OFTEN DO YOU HAVE SIX OR MORE DRINKS ON ONE OCCASION: 0
HOW MANY STANDARD DRINKS CONTAINING ALCOHOL DO YOU HAVE ON A TYPICAL DAY: 0
HAS A RELATIVE, FRIEND, DOCTOR, OR ANOTHER HEALTH PROFESSIONAL EXPRESSED CONCERN ABOUT YOUR DRINKING OR SUGGESTED YOU CUT DOWN: 0
HOW OFTEN DO YOU HAVE A DRINK CONTAINING ALCOHOL: 1
HOW OFTEN DURING THE LAST YEAR HAVE YOU NEEDED AN ALCOHOLIC DRINK FIRST THING IN THE MORNING TO GET YOURSELF GOING AFTER A NIGHT OF HEAVY DRINKING: 0
HOW OFTEN DURING THE LAST YEAR HAVE YOU FOUND THAT YOU WERE NOT ABLE TO STOP DRINKING ONCE YOU HAD STARTED: 0
HOW OFTEN DURING THE LAST YEAR HAVE YOU BEEN UNABLE TO REMEMBER WHAT HAPPENED THE NIGHT BEFORE BECAUSE YOU HAD BEEN DRINKING: 0
HOW OFTEN DURING THE LAST YEAR HAVE YOU HAD A FEELING OF GUILT OR REMORSE AFTER DRINKING: 0

## 2021-12-13 ASSESSMENT — PATIENT HEALTH QUESTIONNAIRE - PHQ9
SUM OF ALL RESPONSES TO PHQ QUESTIONS 1-9: 0
SUM OF ALL RESPONSES TO PHQ9 QUESTIONS 1 & 2: 0
2. FEELING DOWN, DEPRESSED OR HOPELESS: 0
SUM OF ALL RESPONSES TO PHQ QUESTIONS 1-9: 0
SUM OF ALL RESPONSES TO PHQ QUESTIONS 1-9: 0
1. LITTLE INTEREST OR PLEASURE IN DOING THINGS: 0

## 2021-12-13 ASSESSMENT — ENCOUNTER SYMPTOMS
TROUBLE SWALLOWING: 0
COUGH: 0
SHORTNESS OF BREATH: 0
CONSTIPATION: 0
ABDOMINAL PAIN: 0

## 2021-12-13 NOTE — PROGRESS NOTES
SUBJECTIVE:  Hre today for DM follow up   Patient ID: Rio Kauffman is a 77 y.o. female. HPI:   HPI   DM: flucates 120-145 to morning to nighttime. Tests 2 times a day. doesn't feel bad   No side effects, Metformin, Glimepiride   HTN: check every now and then 135-145 ? Diastolic. Thinks that the diastolic is staying greater than 90 will check for 2 weeks then call with numbers. Follows up with Dr. Darcy Kocher for breast cancer going well. I have reviewed her lab and have sent a message to Dr. Darcy Kocher on her elevated MCV. For suggestions,. She states her blood pressure has been staying diastolic above in the 79O and her heart rate has always been really tachycardic and if you look back over the last few years it has been staying above 100 for blood pressure control and to lower that heart rate below 100 we discussed starting Toprol XL 25 mg at night she is to monitor her blood pressure and see me results after a couple of the weeks will see if that helps with her heart rate as well as her blood pressure  Past Medical History:   Diagnosis Date    [facility]     Cancer ([facility]     breast right breast    Diabetes type 2, controlled ([facility]     Encounter for central line care 6/21/2021    H/O screening mammography 12/06/2018    LEFT BR 2 @LH     History of therapeutic radiation 2000    Hx antineoplastic chemo 2000    Hypertension     Osteoarthritis     Osteopenia     on [facility]      Prior to Visit Medications    Medication Sig Taking?  Authorizing Provider   furosemide (LASIX) 20 MG tablet Take 1 tablet by mouth daily Yes TREVON Cameron   glimepiride (AMARYL) 2 MG tablet Take 1 tablet by mouth every morning (before breakfast) Yes TREVON Cameron   lisinopril (PRINIVIL;ZESTRIL) 5 MG tablet Take 1 tablet by mouth 2 times daily Yes TREVON Cameron   raloxifene (EVISTA) 60 MG tablet Take 1 tablet by mouth daily Yes TREVON Cameron   metFORMIN (GLUCOPHAGE) 1000 MG Nose: Nose normal.      Mouth/Throat:      Lips: Pink. Mouth: Mucous membranes are moist.      Dentition: Normal dentition. Pharynx: Oropharynx is clear. Uvula midline. Eyes:      General: Lids are normal.         Right eye: No discharge. Left eye: No discharge. Extraocular Movements: Extraocular movements intact. Conjunctiva/sclera: Conjunctivae normal.      Right eye: Right conjunctiva is not injected. Left eye: Left conjunctiva is not injected. Pupils: Pupils are equal, round, and reactive to light. Neck:      Thyroid: No thyromegaly. Vascular: No carotid bruit or JVD. Cardiovascular:      Rate and Rhythm: Normal rate and regular rhythm. Pulses: Normal pulses. Carotid pulses are 2+ on the right side and 2+ on the left side. Radial pulses are 2+ on the right side and 2+ on the left side. Heart sounds: Normal heart sounds, S1 normal and S2 normal. No murmur heard. Pulmonary:      Effort: Pulmonary effort is normal.      Breath sounds: Normal breath sounds. Chest:   Breasts:      Right: No supraclavicular adenopathy. Left: No supraclavicular adenopathy. Abdominal:      General: Bowel sounds are normal. There is no distension or abdominal bruit. Palpations: Abdomen is soft. There is no mass. Hernia: No hernia is present. Musculoskeletal:         General: Normal range of motion. Cervical back: Full passive range of motion without pain, normal range of motion and neck supple. Right lower leg: No edema. Left lower leg: No edema. Lymphadenopathy:      Cervical: No cervical adenopathy. Right cervical: No superficial cervical adenopathy. Left cervical: No superficial cervical adenopathy. Upper Body:      Right upper body: No supraclavicular adenopathy. Left upper body: No supraclavicular adenopathy. Skin:     General: Skin is warm and dry. Coloration: Skin is not pale. Findings: No lesion or rash. Nails: There is no clubbing. Neurological:      Mental Status: She is alert and oriented to person, place, and time. Motor: No weakness or tremor. Coordination: Coordination normal.      Deep Tendon Reflexes: Reflexes are normal and symmetric. Psychiatric:         Attention and Perception: Attention normal.         Mood and Affect: Mood normal.         Speech: Speech normal.         Behavior: Behavior normal. Behavior is cooperative. Thought Content: Thought content normal.         Cognition and Memory: Cognition and memory normal.         Judgment: Judgment normal.        /70 (Site: Left Upper Arm, Position: Sitting, Cuff Size: Medium Adult)   Pulse 110   Temp 98.5 °F (36.9 °C) (Temporal)   Ht 5' 4\" (1.626 m)   Wt 153 lb (69.4 kg)   SpO2 96%   BMI 26.26 kg/m²      ASSESSMENT:      ICD-10-CM    1. Tachycardia  R00.0 metoprolol succinate (TOPROL XL) 25 MG extended release tablet   2. Essential hypertension  I10 furosemide (LASIX) 20 MG tablet     lisinopril (PRINIVIL;ZESTRIL) 5 MG tablet   3. Type 2 diabetes mellitus without complication, without long-term current use of insulin (HCC)  E11.9 glimepiride (AMARYL) 2 MG tablet     metFORMIN (GLUCOPHAGE) 1000 MG tablet     blood glucose test strips (ASCENSIA AUTODISC VI;ONE TOUCH ULTRA TEST VI) strip   4. Malignant neoplasm of right female breast, unspecified estrogen receptor status, unspecified site of breast [facility]  C50.911 raloxifene (EVISTA) 60 MG tablet       PLAN:    [facility]: Diabetes (follow up)  Reviewed labs in office   Diagnosis and orders for thisvisit are above. Consult Dr. Venice Cardenas over [facility]  To call with reading of BP in 2 weeks    All patient questions answered. Pt voiced understanding. Reviewedhealth maintenance. Instructed to continue current medications, diet and exercise. Patient agreed with treatment plan. Follow up as directed.

## 2021-12-13 NOTE — PATIENT INSTRUCTIONS
Personalized Preventive Plan for [facility] - 12/13/2021  Medicare offers a range of preventive health benefits. Some of the tests and screenings are paid in full while other may be subject to a deductible, co-insurance, and/or copay. Some of these benefits include a comprehensive review of your medical history including lifestyle, illnesses that may run in your family, and various assessments and screenings as appropriate. After reviewing your medical record and screening and assessments performed today your provider may have ordered immunizations, labs, imaging, and/or referrals for you. A list of these orders (if applicable) as well as your Preventive Care list are included within your After Visit Summary for your review. Other Preventive Recommendations:    · A preventive eye exam performed by an eye specialist is recommended every 1-2 years to screen for glaucoma; cataracts, macular degeneration, and other eye disorders. · A preventive dental visit is recommended every 6 months. · Try to get at least 150 minutes of exercise per week or 10,000 steps per day on a pedometer . · Order or download the FREE \"Exercise & Physical Activity: Your Everyday Guide\" from [employer]. Call 0-754.995.1928 or search [employer] online. · You need 4173-5189 mg of calcium and 7768-0609 IU of vitamin D per day. It is possible to meet your calcium requirement with diet alone, but a vitamin D supplement is usually necessary to meet this goal.  · When exposed to the sun, use a sunscreen that protects against both UVA and UVB radiation with an SPF of 30 or greater. Reapply every 2 to 3 hours or after sweating, drying off with a towel, or swimming. · Always wear a seat belt when traveling in a car. Always wear a helmet when riding a bicycle or motorcycle.

## 2021-12-13 NOTE — PROGRESS NOTES
Medicare Annual Wellness Visit  Name: Chiquita Loaiza Date: 2021   MRN: [id number] Sex: Female   Age: 77 y.o. Ethnicity: Non- / Non    : 1945 Race: White (non-)      Darlyn Garcia is here for Medicare AWV    Screenings for behavioral, psychosocial and functional/safety risks, and cognitive dysfunction are all negative except as indicated below. These results, as well as other patient data from the [address] [facility] [place] by [facility] form, are documented in Flowsheets linked to this Encounter. No Known Allergies    Prior to Visit Medications    Medication Sig Taking? Authorizing Provider   furosemide (LASIX) 20 MG tablet Take 1 tablet by mouth daily Yes TREVON Thomas   glimepiride (AMARYL) 2 MG tablet Take 1 tablet by mouth every morning (before breakfast) Yes TREVON Thomas   lisinopril (PRINIVIL;ZESTRIL) 5 MG tablet Take 1 tablet by mouth 2 times daily Yes TREVON Thomas   raloxifene (EVISTA) 60 MG tablet Take 1 tablet by mouth daily Yes TREVON Thomas   metFORMIN (GLUCOPHAGE) 1000 MG tablet TAKE 1 TABLET BY MOUTH TWICE DAILY WITH MEALS Yes TREVON Thomas   blood glucose test strips (ASCENSIA AUTODISC VI;ONE TOUCH ULTRA TEST VI) strip 1 each by In Vitro route daily Daily .  One touch ultra mini E11.9 Yes TREVON Thomas   blood glucose test strips (ONE TOUCH ULTRA TEST) strip USE TO TEST BLOOD SUGAR TWICE DAILY E11.9 Yes TREVON Thomas   UNABLE TO FIND Test strips insurance preferred  2 times a day FBS and 2 hour after meal  E11.9 Yes TREVON Thomas   UNABLE TO FIND E11.9  glucomter Device  Insurance preference Yes TREVON Thomas   Lancets MISC 2 times a day Yes TREVON Thomas   aspirin 81 MG tablet Take 81 mg by mouth daily Yes Historical Provider, MD   Ketoprofen  MG CP24 Take 200 mg by mouth as needed Yes Historical Provider, MD       Past Medical History:   Diagnosis Date    [facility]     Cancer [facility])     breast right breast    Diabetes type 2, controlled ([address] [address]     Encounter for central line care 2021    H/O screening mammography 2018    LEFT BR 2 @LH     History of therapeutic radiation     Hx antineoplastic chemo     Hypertension     Osteoarthritis     Osteopenia     on [employer]       Past Surgical History:   Procedure Laterality Date    BREAST BIOPSY Left     NEGATIVE PER DR. MARTINS    BREAST SURGERY Right     masectomy      SECTION      COLECTOMY      COLONOSCOPY  10/29/2014    PER DR. Arroyo Morris RECALL 5 YEARS     MASTECTOMY Right 2000    TUBAL LIGATION      TUNNELED VENOUS PORT PLACEMENT         Family History   Problem Relation Age of Onset    Cancer Sister     Cancer Brother     Cancer Maternal Grandmother         OVARIAN       CareTeam (Including outside providers/suppliers regularly involved in providing care):   Patient Care Team:  TREVON Dotson as PCP - General (Family Nurse Practitioner)  [facility]    Wt Readings from Last 3 Encounters:   21 153 lb (69.4 kg)   21 153 lb (69.4 kg)   21 155 lb 4.8 oz (70.4 kg)     Vitals:    21 1311   BP: 118/70   Site: Left Upper Arm   Position: Sitting   Cuff Size: Medium Adult   Pulse: 110   Temp: 98.5 °F (36.9 °C)   TempSrc: Temporal   SpO2: 96%   Weight: 153 lb (69.4 kg)   Height: 5' 4\" (1.626 m)     Body mass index is 26.26 kg/m². Based upon direct observation of the patient, evaluation of cognition reveals recent and remote memory intact.     General Appearance: alert and oriented to person, place and time, well developed and well- nourished, in no acute distress  Skin: warm and dry, no rash or erythema  Head: normocephalic and atraumatic  Eyes: pupils equal, round, and reactive to light, extraocular eye movements intact, conjunctivae normal  ENT: tympanic membrane, external ear and ear canal normal bilaterally, nose without deformity, nasal mucosa and turbinates normal without polyps  Neck: supple and non-tender without mass, no thyromegaly or thyroid nodules, no cervical lymphadenopathy  Pulmonary/Chest: clear to auscultation bilaterally- no wheezes, rales or rhonchi, normal air movement, no respiratory distress  Cardiovascular: normal rate, regular rhythm, normal S1 and S2, murmur present, rubs, clicks, or gallops, distal pulses intact, no carotid bruits  Abdomen: soft, non-tender, non-distended, normal bowel sounds, no masses or organomegaly  Extremities: no cyanosis, clubbing or edema  Musculoskeletal: normal range of motion, no joint swelling, deformity or tenderness  Neurologic: reflexes normal and symmetric, no cranial nerve deficit, gait, coordination and speech normal    Patient's complete Health Risk Assessment and screening values have been reviewed and are found in Flowsheets. The following problems were reviewed today and where indicated follow up appointments were made and/or referrals ordered. Positive Risk Factor Screenings with Interventions:          General Health and ACP:  General  In general, how would you say your health is?: Very Good  In the past 7 days, have you experienced any of the following?  New or Increased Pain, New or Increased Fatigue, Loneliness, Social Isolation, Stress or Anger?: None of These  Do you get the social and emotional support that you need?: Yes  Do you have a Living Will?: (!) No  Advance Directives     Power of [place] [employer] Will ACP-Advance Directive ACP-Power of     Not on File Not on File Not on File Not on File      General Health Risk Interventions:  · No Living Will: Advance Care Planning addressed with patient today    Health Habits/Nutrition:  Health Habits/Nutrition  Do you exercise for at least 20 minutes 2-3 times per week?: Yes  Have you lost any weight without trying in the past 3 months?: No  Do you eat only one meal per day?: No  Have you seen the dentist within the past year?: (!) No  Body mass index: (!) 26.26  Health Habits/Nutrition Interventions:  · none    Hearing/Vision:  No exam data present  Hearing/Vision  Do you or your family notice any trouble with your hearing that hasn't been managed with hearing aids?: No  Do you have difficulty driving, watching TV, or doing any of your daily activities because of your eyesight?: No  Have you had an eye exam within the past year?: (!) No  Hearing/Vision Interventions:  · none    Safety:  Safety  Do you have working smoke detectors?: Yes  Have all throw rugs been removed or fastened?: (!) No  Do you have non-slip mats or surfaces in all bathtubs/showers?: Yes  Do all of your stairways have a railing or banister?: Yes  Are your doorways, halls and stairs free of clutter?: Yes  Do you always fasten your seatbelt when you are in a car?: Yes  Safety Interventions:  · Patient declines any further evaluation/treatment for this issue     Personalized Preventive Plan   Current Health Maintenance Status  Immunization History   Administered Date(s) Administered    Influenza, High Dose (Fluzone 65 yrs and older) 10/25/2018    Influenza, Triv, inactivated, subunit, adjuvanted, IM (Fluad 65 yrs and older) 11/26/2019    Pneumococcal Conjugate 13-valent (Norman Neth) 10/25/2018    Pneumococcal Polysaccharide ([id number]) 11/26/2019    Zoster Live (Zostavax) 11/08/2008        Health Maintenance   Topic Date Due    Hepatitis C screen  Never done    Diabetic foot exam  Never done    COVID-19 Vaccine (1) Never done    Diabetic retinal exam  Never done    DTaP/Tdap/Td vaccine (1 - Tdap) Never done    Shingles Vaccine (2 of 3) 01/03/2009    Annual Wellness Visit (AWV)  11/26/2020    Flu vaccine (1) 09/01/2021    A1C test (Diabetic or Prediabetic)  12/10/2022    Lipid screen  12/10/2022    Potassium monitoring  12/10/2022    Creatinine monitoring  12/10/2022    Colon cancer screen colonoscopy  10/04/2031    DEXA pressure is controlled   Provided a follow up plan.   Time spent (minutes): 10 minutes

## 2021-12-13 NOTE — PROGRESS NOTES
After obtaining consent, and per orders of Promise LESTER, injection of shingles given in Left deltoid by Stanley Mckoy MA. Patient instructed to remain in clinic for 20 minutes afterwards, and to report any adverse reaction to me immediately.

## 2022-06-14 ENCOUNTER — OFFICE VISIT (OUTPATIENT)
Dept: FAMILY MEDICINE CLINIC | Age: 77
End: 2022-06-14
Payer: MEDICARE

## 2022-06-14 VITALS
SYSTOLIC BLOOD PRESSURE: 142 MMHG | OXYGEN SATURATION: 95 % | HEART RATE: 91 BPM | RESPIRATION RATE: 20 BRPM | DIASTOLIC BLOOD PRESSURE: 88 MMHG | BODY MASS INDEX: 26.09 KG/M2 | WEIGHT: 152 LBS | TEMPERATURE: 97.6 F

## 2022-06-14 DIAGNOSIS — I10 ESSENTIAL HYPERTENSION: ICD-10-CM

## 2022-06-14 DIAGNOSIS — E11.9 TYPE 2 DIABETES MELLITUS WITHOUT COMPLICATION, WITHOUT LONG-TERM CURRENT USE OF INSULIN (HCC): ICD-10-CM

## 2022-06-14 DIAGNOSIS — C50.911 MALIGNANT NEOPLASM OF RIGHT FEMALE BREAST, UNSPECIFIED ESTROGEN RECEPTOR STATUS, UNSPECIFIED SITE OF BREAST (HCC): ICD-10-CM

## 2022-06-14 LAB
ALBUMIN SERPL-MCNC: 4 G/DL (ref 3.5–5.2)
ALP BLD-CCNC: 60 U/L (ref 35–104)
ALT SERPL-CCNC: 12 U/L (ref 5–33)
ANION GAP SERPL CALCULATED.3IONS-SCNC: 17 MMOL/L (ref 7–19)
AST SERPL-CCNC: 15 U/L (ref 5–32)
BILIRUB SERPL-MCNC: <0.2 MG/DL (ref 0.2–1.2)
BUN BLDV-MCNC: 15 MG/DL (ref 8–23)
CALCIUM SERPL-MCNC: 9 MG/DL (ref 8.8–10.2)
CHLORIDE BLD-SCNC: 103 MMOL/L (ref 98–111)
CHOLESTEROL, FASTING: 173 MG/DL (ref 160–199)
CO2: 22 MMOL/L (ref 22–29)
CREAT SERPL-MCNC: 0.6 MG/DL (ref 0.5–0.9)
GFR AFRICAN AMERICAN: >59
GFR NON-AFRICAN AMERICAN: >60
GLUCOSE BLD-MCNC: 108 MG/DL (ref 74–109)
HBA1C MFR BLD: 6.3 % (ref 4–6)
HCT VFR BLD CALC: 44 % (ref 37–47)
HDLC SERPL-MCNC: 56 MG/DL (ref 65–121)
HEMOGLOBIN: 13.3 G/DL (ref 12–16)
LDL CHOLESTEROL CALCULATED: 81 MG/DL
MCH RBC QN AUTO: 32 PG (ref 27–31)
MCHC RBC AUTO-ENTMCNC: 30.2 G/DL (ref 33–37)
MCV RBC AUTO: 105.8 FL (ref 81–99)
PDW BLD-RTO: 12.4 % (ref 11.5–14.5)
PLATELET # BLD: 228 K/UL (ref 130–400)
PMV BLD AUTO: 8.9 FL (ref 9.4–12.3)
POTASSIUM SERPL-SCNC: 4.6 MMOL/L (ref 3.5–5)
RBC # BLD: 4.16 M/UL (ref 4.2–5.4)
SODIUM BLD-SCNC: 142 MMOL/L (ref 136–145)
TOTAL PROTEIN: 7.1 G/DL (ref 6.6–8.7)
TRIGLYCERIDE, FASTING: 182 MG/DL (ref 0–149)
WBC # BLD: 7.2 K/UL (ref 4.8–10.8)

## 2022-06-14 PROCEDURE — 1123F ACP DISCUSS/DSCN MKR DOCD: CPT | Performed by: NURSE PRACTITIONER

## 2022-06-14 PROCEDURE — G8399 PT W/DXA RESULTS DOCUMENT: HCPCS | Performed by: NURSE PRACTITIONER

## 2022-06-14 PROCEDURE — 2022F DILAT RTA XM EVC RTNOPTHY: CPT | Performed by: NURSE PRACTITIONER

## 2022-06-14 PROCEDURE — 1090F PRES/ABSN URINE INCON ASSESS: CPT | Performed by: NURSE PRACTITIONER

## 2022-06-14 PROCEDURE — 3017F COLORECTAL CA SCREEN DOC REV: CPT | Performed by: NURSE PRACTITIONER

## 2022-06-14 PROCEDURE — 3046F HEMOGLOBIN A1C LEVEL >9.0%: CPT | Performed by: NURSE PRACTITIONER

## 2022-06-14 PROCEDURE — G8417 CALC BMI ABV UP PARAM F/U: HCPCS | Performed by: NURSE PRACTITIONER

## 2022-06-14 PROCEDURE — 99214 OFFICE O/P EST MOD 30 MIN: CPT | Performed by: NURSE PRACTITIONER

## 2022-06-14 PROCEDURE — 1036F TOBACCO NON-USER: CPT | Performed by: NURSE PRACTITIONER

## 2022-06-14 PROCEDURE — G8427 DOCREV CUR MEDS BY ELIG CLIN: HCPCS | Performed by: NURSE PRACTITIONER

## 2022-06-14 RX ORDER — GLIMEPIRIDE 2 MG/1
TABLET ORAL
Qty: 90 TABLET | Refills: 1 | Status: SHIPPED | OUTPATIENT
Start: 2022-06-14

## 2022-06-14 RX ORDER — FUROSEMIDE 20 MG/1
20 TABLET ORAL DAILY
Qty: 90 TABLET | Refills: 1 | Status: SHIPPED | OUTPATIENT
Start: 2022-06-14

## 2022-06-14 RX ORDER — RALOXIFENE HYDROCHLORIDE 60 MG/1
TABLET, FILM COATED ORAL
Qty: 90 TABLET | Refills: 1 | Status: SHIPPED | OUTPATIENT
Start: 2022-06-14

## 2022-06-14 RX ORDER — LISINOPRIL 5 MG/1
5 TABLET ORAL 2 TIMES DAILY
Qty: 180 TABLET | Refills: 1 | Status: SHIPPED | OUTPATIENT
Start: 2022-06-14

## 2022-06-14 SDOH — ECONOMIC STABILITY: FOOD INSECURITY: WITHIN THE PAST 12 MONTHS, YOU WORRIED THAT YOUR FOOD WOULD RUN OUT BEFORE YOU GOT MONEY TO BUY MORE.: NEVER TRUE

## 2022-06-14 SDOH — ECONOMIC STABILITY: FOOD INSECURITY: WITHIN THE PAST 12 MONTHS, THE FOOD YOU BOUGHT JUST DIDN'T LAST AND YOU DIDN'T HAVE MONEY TO GET MORE.: NEVER TRUE

## 2022-06-14 ASSESSMENT — PATIENT HEALTH QUESTIONNAIRE - PHQ9
SUM OF ALL RESPONSES TO PHQ QUESTIONS 1-9: 0
SUM OF ALL RESPONSES TO PHQ9 QUESTIONS 1 & 2: 0
SUM OF ALL RESPONSES TO PHQ QUESTIONS 1-9: 0
SUM OF ALL RESPONSES TO PHQ QUESTIONS 1-9: 0
1. LITTLE INTEREST OR PLEASURE IN DOING THINGS: 0
2. FEELING DOWN, DEPRESSED OR HOPELESS: 0
SUM OF ALL RESPONSES TO PHQ QUESTIONS 1-9: 0

## 2022-06-14 ASSESSMENT — ENCOUNTER SYMPTOMS
PHOTOPHOBIA: 0
SHORTNESS OF BREATH: 0
COUGH: 0
ABDOMINAL PAIN: 0
TROUBLE SWALLOWING: 0
BACK PAIN: 0
BLOOD IN STOOL: 0

## 2022-06-14 ASSESSMENT — SOCIAL DETERMINANTS OF HEALTH (SDOH): HOW HARD IS IT FOR YOU TO PAY FOR THE VERY BASICS LIKE FOOD, HOUSING, MEDICAL CARE, AND HEATING?: NOT HARD AT ALL

## 2022-06-14 NOTE — PROGRESS NOTES
SUBJECTIVE:  Diabetic check up   Patient ID: Michael Mead is a 77 y.o. female. HPI:   HPI   Diabetes Same Checking Average higher 140 afternoon lower to 110-112. Eats supper at 6 pm and then have a snack. HTN: Fluctuates 135-145/Type of work Stress and on the go Sleeps well. No headaches or chest pain Headaches from EYE strain. Pedal edema not a problem. Breast Cancer 22 years free. Of cancer   Past Medical History:   Diagnosis Date    [facility] ([facility]     Cancer ([facility]     breast right breast    Diabetes type 2, controlled ([facility]     Encounter for central line care 6/21/2021    H/O screening mammography 12/06/2018    LEFT BR 2 @     History of therapeutic radiation 2000    Hx antineoplastic chemo 2000    Hypertension     Osteoarthritis     Osteopenia     on [employer]      Prior to Visit Medications    Medication Sig Taking? Authorizing Provider   raloxifene (EVISTA) 60 MG tablet Take 1 tablet by mouth once daily Yes TREVON Cheung   glimepiride (AMARYL) 2 MG tablet TAKE 1 TABLET BY MOUTH ONCE DAILY IN THE MORNING BEFORE BREAKFAST Yes TREVON Cheung   furosemide (LASIX) 20 MG tablet Take 1 tablet by mouth daily Yes TREVON Cheung   lisinopril (PRINIVIL;ZESTRIL) 5 MG tablet Take 1 tablet by mouth 2 times daily Yes TREVON Cheung   metFORMIN (GLUCOPHAGE) 1000 MG tablet TAKE 1 TABLET BY MOUTH TWICE DAILY WITH MEALS Yes TREVON Cheung   blood glucose test strips (ASCENSIA AUTODISC VI;ONE TOUCH ULTRA TEST VI) strip 1 each by In Vitro route daily Daily .  One touch ultra mini E11.9 Yes TREVON Cheung   metoprolol succinate (TOPROL XL) 25 MG extended release tablet Take 1 tablet by mouth nightly Yes TREVON Cheung   blood glucose test strips (ONE TOUCH ULTRA TEST) strip USE TO TEST BLOOD SUGAR TWICE DAILY E11.9 Yes TREVON Cheung   UNABLE TO FIND Test strips insurance preferred  2 times a day FBS and 2 hour after meal  E11.9 Yes TREVON Nur   UNABLE TO FIND E11.9  glucomter Device  Insurance preference Yes TREVON Nur   Lancets MISC 2 times a day Yes TREVON Nur   aspirin 81 MG tablet Take 81 mg by mouth daily Yes Historical Provider, MD   Ketoprofen  MG CP24 Take 200 mg by mouth as needed Yes Historical Provider, MD     No Known Allergies    Review of Systems   Constitutional: Positive for fatigue. Negative for unexpected weight change. HENT: Positive for hearing loss. Negative for tinnitus and trouble swallowing. Eyes: Positive for visual disturbance. Negative for photophobia. Respiratory: Negative for cough and shortness of breath. Cardiovascular: Negative for chest pain. Gastrointestinal: Negative for abdominal pain and blood in stool. Genitourinary: Negative for difficulty urinating and hematuria. Musculoskeletal: Positive for arthralgias. Negative for back pain. Neurological: Positive for headaches. Negative for dizziness. Psychiatric/Behavioral: Negative for dysphoric mood and sleep disturbance. The patient is not nervous/anxious. OBJECTIVE:    Physical Exam  Constitutional:       Appearance: Normal appearance. She is well-developed and well-groomed. HENT:      Head: Normocephalic and atraumatic. Right Ear: Tympanic membrane, ear canal and external ear normal. There is no impacted cerumen. Left Ear: Tympanic membrane, ear canal and external ear normal. There is no impacted cerumen. Nose: Nose normal.      Mouth/Throat:      Lips: Pink. Mouth: Mucous membranes are moist.      Dentition: Normal dentition. Pharynx: Oropharynx is clear. Uvula midline. Eyes:      General: Lids are normal.         Right eye: No discharge. Left eye: No discharge. Extraocular Movements: Extraocular movements intact. Conjunctiva/sclera: Conjunctivae normal.      Right eye: Right conjunctiva is not injected.       Left eye: Left conjunctiva is not injected. Pupils: Pupils are equal, round, and reactive to light. Neck:      Thyroid: No thyromegaly. Vascular: No carotid bruit or JVD. Cardiovascular:      Rate and Rhythm: Normal rate and regular rhythm. Pulses: Normal pulses. Carotid pulses are 2+ on the right side and 2+ on the left side. Radial pulses are 2+ on the right side and 2+ on the left side. Heart sounds: S1 normal and S2 normal. Murmur heard. Pulmonary:      Effort: Pulmonary effort is normal.      Breath sounds: Normal breath sounds. Chest:   Breasts:      Right: No supraclavicular adenopathy. Left: No supraclavicular adenopathy. Abdominal:      General: Bowel sounds are normal. There is no distension or abdominal bruit. Palpations: Abdomen is soft. There is no mass. Hernia: No hernia is present. Musculoskeletal:         General: Normal range of motion. Cervical back: Full passive range of motion without pain, normal range of motion and neck supple. Right lower leg: No edema. Left lower leg: No edema. Lymphadenopathy:      Cervical: No cervical adenopathy. Right cervical: No superficial cervical adenopathy. Left cervical: No superficial cervical adenopathy. Upper Body:      Right upper body: No supraclavicular adenopathy. Left upper body: No supraclavicular adenopathy. Skin:     General: Skin is warm and dry. Coloration: Skin is not pale. Findings: No lesion or rash. Nails: There is no clubbing. Neurological:      Mental Status: She is alert and oriented to person, place, and time. Motor: No weakness or tremor. Coordination: Coordination normal.      Deep Tendon Reflexes: Reflexes are normal and symmetric.    Psychiatric:         Attention and Perception: Attention normal.         Mood and Affect: Mood normal.         Speech: Speech normal.         Behavior: Behavior normal. Behavior is cooperative. Thought Content: Thought content normal.         Cognition and Memory: Cognition and memory normal.         Judgment: Judgment normal.        BP (!) 142/88 (Site: Left Upper Arm, Position: Sitting, Cuff Size: Medium Adult)   Pulse 91   Temp 97.6 °F (36.4 °C) (Temporal)   Resp 20   Wt 152 lb (68.9 kg)   SpO2 95%   BMI 26.09 kg/m²      ASSESSMENT:      ICD-10-CM    1. Malignant neoplasm of right female breast, unspecified estrogen receptor status, unspecified site of breast (HCC)  C50.911 raloxifene (EVISTA) 60 MG tablet   2. Type 2 diabetes mellitus without complication, without long-term current use of insulin (HCC)  E11.9 glimepiride (AMARYL) 2 MG tablet     metFORMIN (GLUCOPHAGE) 1000 MG tablet     Hemoglobin A1C     Microalbumin / Creatinine Urine Ratio     Lipid, Fasting   3. Essential hypertension  I10 furosemide (LASIX) 20 MG tablet     lisinopril (PRINIVIL;ZESTRIL) 5 MG tablet     CBC     Comprehensive Metabolic Panel       PLAN:    [place]: Diabetes (check up and med refills . C/O feeling drained )  RTC in 6 months     Diagnosis and orders for thisvisit are above. All patient questions answered. Pt voiced understanding. Reviewedhealth maintenance. Instructed to continue current medications, diet and exercise. Patient agreed with treatment plan. Follow up as directed.

## 2022-06-16 ENCOUNTER — HOSPITAL ENCOUNTER (OUTPATIENT)
Dept: WOMENS IMAGING | Age: 77
Discharge: HOME | End: 2022-06-16
Payer: MEDICARE

## 2022-06-16 DIAGNOSIS — Z12.31 ENCOUNTER FOR SCREENING MAMMOGRAM FOR MALIGNANT NEOPLASM OF BREAST: ICD-10-CM

## 2022-06-22 ENCOUNTER — HOSPITAL ENCOUNTER (OUTPATIENT)
Dept: WOMENS IMAGING | Age: 77
Discharge: HOME | End: 2022-06-22
Payer: MEDICARE

## 2022-06-22 PROCEDURE — 77063 BREAST TOMOSYNTHESIS BI: CPT

## 2022-06-30 DIAGNOSIS — D64.9 ANEMIA, UNSPECIFIED TYPE: Primary | ICD-10-CM

## 2022-07-06 ENCOUNTER — TELEPHONE (OUTPATIENT)
Dept: FAMILY MEDICINE CLINIC | Age: 77
End: 2022-07-06

## 2022-07-07 RX ORDER — PREDNISONE 20 MG/1
20 TABLET ORAL 2 TIMES DAILY
Qty: 10 TABLET | Refills: 0 | Status: SHIPPED | OUTPATIENT
Start: 2022-07-07 | End: 2022-07-12

## 2022-07-11 ENCOUNTER — TELEPHONE (OUTPATIENT)
Dept: FAMILY MEDICINE CLINIC | Age: 77
End: 2022-07-11

## 2022-07-11 NOTE — TELEPHONE ENCOUNTER
----- Message from Valeria Parks sent at 7/6/2022  3:04 PM CDT -----  Subject: Message to Provider    QUESTIONS  Information for Provider? pt just tested positive for covid on 7/6/22, she   doesn't feel good but does not have a temp. she is having symptoms of sore   throat and drainage. pt is not sure if she should be seen or not, please   advise pt on what dr thinks she should do.  ---------------------------------------------------------------------------  --------------  [address]  [phone]; OK to leave message on voicemail  ---------------------------------------------------------------------------  --------------  SCRIPT ANSWERS  Relationship to Patient?  Self

## 2022-07-15 DIAGNOSIS — C50.911 MALIGNANT NEOPLASM OF RIGHT FEMALE BREAST, UNSPECIFIED ESTROGEN RECEPTOR STATUS, UNSPECIFIED SITE OF BREAST (HCC): Primary | ICD-10-CM

## 2022-07-15 NOTE — PROGRESS NOTES
Patient:  Esha Esteban  YOB: 1945  Date of Service: 7/18/2022  MRN: [id number]    Primary Care Physician: TREVON Menjivar    Chief Complaint   Patient presents with    Follow-up     Malignant neoplasm of right female breast, unspecified estrogen receptor status, unspecified site of breast [facility])         Patient Seen, Chart, Consults notes, Labs, Radiology studies reviewed. 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 A Right mastectomy in April 1993 was followed by adjuvant chemotherapy with FAC x 3 and CMF x 6. She is here for review of this and follow-up of her previous history of right inflammatory breast cancer and resected colon cancer. 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 Incomplete removal with piecemeal EMR as described above, with a portion of the proximal margin of the polyp that was not amenable to endoscopic resection. 2.   One 2 mm polyp in the rectum, removed with a jumbo cold forceps. Resected and retrieved. Pathology:  1.    Colon, hepatic flexure polyp, endoscopic mucosal resection - Fragments of tubular adenoma, see note    Note: Margins of excision cannot be commented upon due to fragmented nature of the biopsy  2.  . Rectum, polyp, polypectomy - Hyperplastic polyp   Recommendation:  Refer to colorectal surgery for consideration of surgical management of incompletely resected large hepatic flexure polyp. CT Abd & Pelvis on 12/9/2020 at [facility] in [place] revealed:  No metastatic disease identified in the abdomen or pelvis. Multiple hepatic cysts, the largest of which demonstrates wall calcification and may represent a biliary cystadenoma, celiac foregut cyst, or a cyst complicated by prior hemorrhage/inflammation. Follow-up MRI in 3-6 months is recommended. Alternatively, comparison with prior studies may be helpful if available. 12/17/2020 - Right hemicolectomy at [facility] in [address] [place] [address]):  Rashid Georges. Maritza Michelle M.D., Edilia Freitas M.D., Gregory Crawford M.D. Diagnosis:  A. Colon, right colon, right colectomy      - Adenocarcinoma with signet ring features, associated with tubular adenoma  - Tumor invades submucosa, pT1  - Perineural invasion present  - Lymphovascular space invasion is not identified  - Margins are negative for tumor  - Appendix with no histopathologic abnormality  - No evidence of malignancy in twenty-two lymph nodes (0/22)    Donnie Severe was evaluated by medical oncology at [facility] in [place]. She was told that no further intervention or work-up would be necessary. No adjuvant therapy recommended. This is appropriate. TARGET BREAST CANCER SITES:  Right mastectomy post chemo/ radiation in April 1993. TUMOR HISTORY: Inflammatory right breast cancer diagnosed 1/22/1993  Donnie Severe originally presented in 1/93 with an engorged, inflamed right breast. She had a history of chronic fibrocystic disease of the breast and initially she thought this was secondary to periodic menstrual engorgement that she had had. Donnie Severe underwent a right breast biopsy on 1/22/93 by Dr. Rachell Mckeon due to the persistence of the swelling.   Pathology revealed a 3.5 X 2.0 X 1.0cm indurated mass with dermal lymphatic involvement of infiltrating breast cancer. She underwent neoadjuvant chemotherapy with FAC. She also underwent neoadjuvant XRT that consisted of 5,040 cGy. She received 3 courses of the chemotherapy therapy during the radiation therapy with a good result. She subsequently underwent a right mastectomy in 4/93. This was an ER/HI negative breast cancer. Following surgery she received 3 more courses of adjuvant FAC totaling 6 courses of FAC. She then went on to receive 6 more courses of CMF chemotherapy. She has continued with regular follow-up clinical visits without evidence of new or recurrent disease. She had a left breast biopsy on 2/24/03 by Dr. Brandon Mitchell that revealed benign fibrocystic disease changes. TREATMENT SUMMARY:  FAC x 3. Radiation therapy to the right breast and chest wall area with 5040 cGy. Right mastectomy in April 1993. FAC x 3. CMF x 6. Allergies:  Patient has no known allergies. Medicines:  Current Outpatient Medications   Medication Sig Dispense Refill    raloxifene (EVISTA) 60 MG tablet Take 1 tablet by mouth once daily 90 tablet 1    glimepiride (AMARYL) 2 MG tablet TAKE 1 TABLET BY MOUTH ONCE DAILY IN THE MORNING BEFORE BREAKFAST 90 tablet 1    furosemide (LASIX) 20 MG tablet Take 1 tablet by mouth daily 90 tablet 1    lisinopril (PRINIVIL;ZESTRIL) 5 MG tablet Take 1 tablet by mouth 2 times daily 180 tablet 1    metFORMIN (GLUCOPHAGE) 1000 MG tablet TAKE 1 TABLET BY MOUTH TWICE DAILY WITH MEALS 180 tablet 1    blood glucose test strips (ASCENSIA AUTODISC VI;ONE TOUCH ULTRA TEST VI) strip 1 each by In Vitro route daily Daily .  One touch ultra mini E11.9 100 each 3    metoprolol succinate (TOPROL XL) 25 MG extended release tablet Take 1 tablet by mouth nightly 90 tablet 1    blood glucose test strips (ONE TOUCH ULTRA TEST) strip USE TO TEST BLOOD SUGAR TWICE DAILY E11.9 100 strip 4    UNABLE TO FIND Test strips insurance preferred  2 times a day FBS and 2 hour after meal  E11.9 100 strip 5    UNABLE TO FIND E11.9  glucomter Device  Insurance preference 1 Device 0    Lancets MISC 2 times a day 100 each 5    aspirin 81 MG tablet Take 81 mg by mouth daily      Ketoprofen  MG CP24 Take 200 mg by mouth as needed       No current facility-administered medications for this visit. Past Medical History:      Diagnosis Date    Breast cancer ([facility] [address]     Cancer ([facility]     breast right breast    Diabetes type 2, controlled ([facility]     Encounter for central line care 2021    H/O screening mammography 2018    LEFT BR 2 @LH     History of therapeutic radiation 2000    Hx antineoplastic chemo     Hypertension     Osteoarthritis     Osteopenia     on Evista        Past Surgical History:      Procedure Laterality Date    BREAST BIOPSY Left     NEGATIVE PER DR. MARTINS    BREAST SURGERY Right     masectomy      SECTION      COLECTOMY      COLONOSCOPY  10/29/2014    PER DR. Johnnie Todd RECALL 5 YEARS     MASTECTOMY Right 2000    TUBAL LIGATION      TUNNELED VENOUS PORT PLACEMENT          Family History:      Problem Relation Age of Onset    Cancer Sister     Cancer Brother     Cancer Maternal Grandmother         OVARIAN        Social History  Social History     Tobacco Use    Smoking status: Never    Smokeless tobacco: Never   Substance Use Topics    Alcohol use: Yes     Comment: OCCASIONALLY    Drug use: No             Labs:  BMP: No results for input(s): NA, K, CL, CO2, PHOS, BUN, CREATININE, CALCIUM in the last 72 hours. CBC:   Recent Labs     22  1025   WBC 10.07*   HGB 14.9   HCT 49.8*   .3*        LIVER PROFILE: No results for input(s): AST, ALT, LIPASE, BILIDIR, BILITOT, ALKPHOS in the last 72 hours. Invalid input(s): AMYLASE,  ALB  PT/INR: No results for input(s): PROTIME, INR in the last 72 hours. APTT: No results for input(s): APTT in the last 72 hours.   BNP:  No results for input(s): BNP in the last 72 hours. Ionized Calcium:No results for input(s): IONCA in the last 72 hours. Magnesium:No results for input(s): MG in the last 72 hours. Phosphorus:No results for input(s): PHOS in the last 72 hours. HgbA1C: No results for input(s): LABA1C in the last 72 hours. Hepatic: No results for input(s): ALKPHOS, ALT, AST, PROT, BILITOT, BILIDIR, LABALBU in the last 72 hours. Lactic Acid: No results for input(s): LACTA in the last 72 hours. Troponin: No results for input(s): CKTOTAL, CKMB, TROPONINT in the last 72 hours. ABGs: No results for input(s): PH, PCO2, PO2, HCO3, O2SAT in the last 72 hours. CRP:  No results for input(s): CRP in the last 72 hours. Sed Rate:  No results for input(s): SEDRATE in the last 72 hours. Cultures:   No results for input(s): CULTURE in the last 72 hours. ASSESSMENT AND PLAN:    Tiki Bustillos is a pleasant 76 y/o  female managed with primary and secondary diagnoses as outlined  Right sided ER/TN negative inflammatory breast cancer made on 1/22/1993. 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 A Right mastectomy in April 1993 was followed by adjuvant chemotherapy with FAC x 3 and CMF x 6. She is here for review of this and follow-up of her previous history of right inflammatory breast cancer and resected colon cancer. #1  Resected right stage I ( pT1, N0 M0) Adenocarcinoma with signet ring features 12/17/2020     Colonoscopy 10/1/2020 per Dr Richard Pabon at [address] [facility]  Impression:             30 mm hepatic flexure polyp located in between two folds which was proximal to a previously placed tattoo marking.  Incomplete removal with piecemeal EMR as described above, with a portion of the proximal margin of the polyp that was not amenable to endoscopic resection. 2.   One 2 mm polyp in the rectum, removed with a jumbo cold forceps. Resected and retrieved. Pathology:  1. Colon, hepatic flexure polyp, endoscopic mucosal resection - Fragments of tubular adenoma, see note    Note: Margins of excision cannot be commented upon due to fragmented nature of the biopsy  2.  . Rectum, polyp, polypectomy - Hyperplastic polyp   Recommendation:  Refer to colorectal surgery for consideration of surgical management of incompletely resected large hepatic flexure polyp. CT Abd & Pelvis on 12/9/2020 at [facility] in [place] revealed:  No metastatic disease identified in the abdomen or pelvis. Multiple hepatic cysts, the largest of which demonstrates wall calcification and may represent a biliary cystadenoma, celiac foregut cyst, or a cyst complicated by prior hemorrhage/inflammation. Follow-up MRI in 3-6 months is recommended. Alternatively, comparison with prior studies may be helpful if available. 12/17/2020 - Right hemicolectomy at [facility] in [address] [place] [address]):  Dolly Kirkland M.D., John Russell M.D., Annabelle Perez M.D. Diagnosis:  A. Colon, right colon, right colectomy      - Adenocarcinoma with signet ring features, associated with tubular adenoma  - Tumor invades submucosa, pT1  - Perineural invasion present  - Lymphovascular space invasion is not identified  - Margins are negative for tumor  - Appendix with no histopathologic abnormality  - No evidence of malignancy in twenty-two lymph nodes (0/22)    Kita Bazan was evaluated by medical oncology at [facility] in [place]. She was told that no further intervention or work-up would be necessary. No adjuvant therapy recommended. This is appropriate.     Review of systems and physical examination is without evidence or suggestion of new problems associated with her the rectum, removed with a jumbo cold forceps. Resected and retrieved. Pathology:  1. Colon, hepatic flexure polyp, endoscopic mucosal resection - Fragments of tubular adenoma, see note    Note: Margins of excision cannot be commented upon due to fragmented nature of the biopsy  2.  . Rectum, polyp, polypectomy - Hyperplastic polyp   Recommendation:  Refer to colorectal surgery for consideration of surgical management of incompletely resected large hepatic flexure polyp. Darryl Sanders underwent a right hemicolectomy at [facility] in [place] for a   right stage I (pT1, N0 M0) Adenocarcinoma with signet ring features 12/17/2020 by Breanna Woodruff. Joss Santamaria M.D.,       Colonoscopy on 10/4/2021 by Dr. Carrie Escobar on 10/4/2021 with a 3 year recall  Final Diagnosis    Anastomosis site, colon, biopsies:  Hyperplastic colonic mucosa. #4  Bone health    DEXA scan at [facility] on 11/26/2018 showed osteopenia  She is on calcium and vitamin D    #5  GYN cancer screening    Job Lili, her PCP monitors her from the GYN standpoint. #6   Chronic liver mass abnormality dating back to at least 1993    An ultrasound of the liver on 7/6/2017 documented a large 10.2 x 8.8 x 8.1 cm septated cystic mass in the liver of uncertain nature with a CAT scan of the liver recommended. A CT scan of the abdomen with IV contrast on 7/10/2017 described a large hypodense left hepatic lobe lesion measuring 9.2 x 8.1 x 8.9 cm in size with a thin septation noted with him it which was partially calcified. No enhancement of the lesion was identified. Other multiple hepatic cysts were also described along with hepatic steatosis. Records from the last 24 years on this patient were reviewed in detail. A CT scan of the abdomen and pelvis from 1/28/1993 was found where the cysts were initially described. An ultrasound of the liver on 2/4/1993 documented the largest cyst measuring 4.8 x 5.7 cm at that time.     Darryl Sanders does not have symptoms in the right upper quadrant. Given all of the above findings listed, this is most consistent with a benign process. With conservative yearly ultrasound monitoring only recommended unless changes are found. The followup ultrasound of the liver on 5/9/2018 revealed stable cysts. Ultrasound of the liver on 5/6/2019 at [facility] was compared to 5/19/2018 and revealed:  Fatty infiltration of the liver  9 x 9.2 x 8.9 cm large septated cyst   an adjacent 2.4 x 3.2 x 3.5 cm smaller cyst- has increased in size moderately from the previous study when it measured 2.1 x 1.70 1.8 cm. A followup ultrasound will be done next year in May    #7  Port flush    Emir Samaniego is due for port flush. She gets this done yearly, declines to have it more frequently or to have the port removed    #8  Immunizations:  Immunization History   Administered Date(s) Administered    COVID-19, J&J, (age 18y+), IM, 0.5 mL 04/06/2021    Influenza, High Dose (Fluzone 65 yrs and older) 10/25/2018    Influenza, Triv, inactivated, subunit, adjuvanted, IM (Fluad 65 yrs and older) 11/26/2019    Pneumococcal Conjugate 13-valent (Noreene Hoof) 10/25/2018    Pneumococcal Polysaccharide ([id number]) 11/26/2019    Zoster Live (Zostavax) 11/08/2008    Zoster Recombinant (Shingrix) 12/13/2021           Emir Samaniego was seen today for follow-up. Diagnoses and all orders for this visit:    Encounter for screening mammogram for malignant neoplasm of breast  -     VAIBHAV DIGITAL SCREEN UNILATERAL LEFT; Future    Malignant neoplasm of right female breast, unspecified estrogen receptor status, unspecified site of breast ([facility] [address]  -     Comprehensive Metabolic Panel; Future  -     Cancer Antigen 15-3; Future  -     CEA;  Future      Orders Placed This Encounter   Procedures    VAIBHAV DIGITAL SCREEN UNILATERAL LEFT     Standing Status:   Future     Standing Expiration Date:   9/18/2023     Order Specific Question:   Reason for exam:     Answer:   Routine mammogram due after 6/22/2023 Comprehensive Metabolic Panel     Standing Status:   Future     Standing Expiration Date:   7/18/2023    Cancer Antigen 15-3     Standing Status:   Future     Standing Expiration Date:   7/18/2023    CEA     Standing Status:   Future     Standing Expiration Date:   7/18/2023         Return in about 1 year (around 7/18/2023).

## 2022-07-18 ENCOUNTER — HOSPITAL ENCOUNTER (OUTPATIENT)
Dept: INFUSION THERAPY | Age: 77
Discharge: HOME | End: 2022-07-18
Payer: MEDICARE

## 2022-07-18 ENCOUNTER — OFFICE VISIT (OUTPATIENT)
Dept: HEMATOLOGY | Age: 77
End: 2022-07-18
Payer: MEDICARE

## 2022-07-18 VITALS
SYSTOLIC BLOOD PRESSURE: 138 MMHG | WEIGHT: 147.8 LBS | HEIGHT: 63 IN | BODY MASS INDEX: 26.19 KG/M2 | OXYGEN SATURATION: 100 % | HEART RATE: 112 BPM | DIASTOLIC BLOOD PRESSURE: 90 MMHG

## 2022-07-18 DIAGNOSIS — C50.911 MALIGNANT NEOPLASM OF RIGHT FEMALE BREAST, UNSPECIFIED ESTROGEN RECEPTOR STATUS, UNSPECIFIED SITE OF BREAST (HCC): ICD-10-CM

## 2022-07-18 DIAGNOSIS — Z12.31 ENCOUNTER FOR SCREENING MAMMOGRAM FOR MALIGNANT NEOPLASM OF BREAST: Primary | ICD-10-CM

## 2022-07-18 DIAGNOSIS — Z45.2 ENCOUNTER FOR CENTRAL LINE CARE: Primary | ICD-10-CM

## 2022-07-18 LAB
ALP BLD-CCNC: 58 U/L (ref 35–104)
ANION GAP SERPL CALCULATED.3IONS-SCNC: 11 MMOL/L (ref 7–19)
BASOPHILS ABSOLUTE: 0.09 K/UL (ref 0.01–0.08)
BASOPHILS RELATIVE PERCENT: 0.9 % (ref 0.1–1.2)
CA 15-3: 20 U/ML (ref 0–25)
CALCIUM SERPL-MCNC: 9 MG/DL (ref 8.4–10.2)
CEA: 1.3 NG/ML (ref 0–4.7)
CHLORIDE BLD-SCNC: 95 MMOL/L (ref 98–111)
CO2: 27 MMOL/L (ref 22–29)
EOSINOPHILS ABSOLUTE: 0.11 K/UL (ref 0.04–0.54)
EOSINOPHILS RELATIVE PERCENT: 1.1 % (ref 0.7–7)
HCT VFR BLD CALC: 49.8 % (ref 34.1–44.9)
HEMOGLOBIN: 14.9 G/DL (ref 11.2–15.7)
LYMPHOCYTES ABSOLUTE: 2.49 K/UL (ref 1.18–3.74)
LYMPHOCYTES RELATIVE PERCENT: 24.7 % (ref 19.3–53.1)
MCH RBC QN AUTO: 32.1 PG (ref 25.6–32.2)
MCHC RBC AUTO-ENTMCNC: 29.9 G/DL (ref 32.3–35.5)
MCV RBC AUTO: 107.3 FL (ref 79.4–94.8)
MONOCYTES ABSOLUTE: 0.9 K/UL (ref 0.24–0.82)
MONOCYTES RELATIVE PERCENT: 8.9 % (ref 4.7–12.5)
NEUTROPHILS ABSOLUTE: 6.39 K/UL (ref 1.56–6.13)
NEUTROPHILS RELATIVE PERCENT: 63.5 % (ref 34–71.1)
PDW BLD-RTO: 12 % (ref 11.7–14.4)
PLATELET # BLD: 205 K/UL (ref 182–369)
PMV BLD AUTO: 8.9 FL (ref 7.4–10.4)
RBC # BLD: 4.64 M/UL (ref 3.93–5.22)
TOTAL PROTEIN: 7.1 G/DL (ref 6.3–8.2)
WBC # BLD: 10.07 K/UL (ref 3.98–10.04)

## 2022-07-18 PROCEDURE — 85025 COMPLETE CBC W/AUTO DIFF WBC: CPT

## 2022-07-18 PROCEDURE — 2580000003 HC RX 258: Performed by: INTERNAL MEDICINE

## 2022-07-18 PROCEDURE — 96374 THER/PROPH/DIAG INJ IV PUSH: CPT

## 2022-07-18 PROCEDURE — 36415 COLL VENOUS BLD VENIPUNCTURE: CPT

## 2022-07-18 PROCEDURE — 99212 OFFICE O/P EST SF 10 MIN: CPT

## 2022-07-18 PROCEDURE — 6360000002 HC RX W HCPCS: Performed by: INTERNAL MEDICINE

## 2022-07-18 PROCEDURE — 36415 COLL VENOUS BLD VENIPUNCTURE: CPT | Performed by: INTERNAL MEDICINE

## 2022-07-18 PROCEDURE — 99214 OFFICE O/P EST MOD 30 MIN: CPT | Performed by: INTERNAL MEDICINE

## 2022-07-18 PROCEDURE — G8427 DOCREV CUR MEDS BY ELIG CLIN: HCPCS | Performed by: INTERNAL MEDICINE

## 2022-07-18 PROCEDURE — 1090F PRES/ABSN URINE INCON ASSESS: CPT | Performed by: INTERNAL MEDICINE

## 2022-07-18 PROCEDURE — 3017F COLORECTAL CA SCREEN DOC REV: CPT | Performed by: INTERNAL MEDICINE

## 2022-07-18 PROCEDURE — G8399 PT W/DXA RESULTS DOCUMENT: HCPCS | Performed by: INTERNAL MEDICINE

## 2022-07-18 PROCEDURE — 1123F ACP DISCUSS/DSCN MKR DOCD: CPT | Performed by: INTERNAL MEDICINE

## 2022-07-18 PROCEDURE — G8417 CALC BMI ABV UP PARAM F/U: HCPCS | Performed by: INTERNAL MEDICINE

## 2022-07-18 PROCEDURE — 1036F TOBACCO NON-USER: CPT | Performed by: INTERNAL MEDICINE

## 2022-07-18 RX ORDER — SODIUM CHLORIDE 9 MG/ML
25 INJECTION, SOLUTION INTRAVENOUS PRN
OUTPATIENT
Start: 2022-07-18

## 2022-07-18 RX ORDER — SODIUM CHLORIDE 0.9 % (FLUSH) 0.9 %
5-40 SYRINGE (ML) INJECTION PRN
Status: DISCONTINUED | OUTPATIENT
Start: 2022-07-18 | End: 2022-07-19 | Stop reason: HOSPADM

## 2022-07-18 RX ORDER — HEPARIN SODIUM (PORCINE) LOCK FLUSH IV SOLN 100 UNIT/ML 100 UNIT/ML
500 SOLUTION INTRAVENOUS PRN
Status: DISCONTINUED | OUTPATIENT
Start: 2022-07-18 | End: 2022-07-19 | Stop reason: HOSPADM

## 2022-07-18 RX ORDER — SODIUM CHLORIDE 0.9 % (FLUSH) 0.9 %
5-40 SYRINGE (ML) INJECTION PRN
OUTPATIENT
Start: 2022-07-18

## 2022-07-18 RX ORDER — HEPARIN SODIUM (PORCINE) LOCK FLUSH IV SOLN 100 UNIT/ML 100 UNIT/ML
500 SOLUTION INTRAVENOUS PRN
OUTPATIENT
Start: 2022-07-18

## 2022-07-18 RX ADMIN — WATER 2.2 ML: 1 INJECTION INTRAMUSCULAR; INTRAVENOUS; SUBCUTANEOUS at 11:53

## 2022-07-18 RX ADMIN — HEPARIN 500 UNITS: 100 SYRINGE at 12:20

## 2022-07-18 RX ADMIN — ALTEPLASE 2 MG: 2.2 INJECTION, POWDER, LYOPHILIZED, FOR SOLUTION INTRAVENOUS at 11:53

## 2022-07-18 RX ADMIN — SODIUM CHLORIDE, PRESERVATIVE FREE 10 ML: 5 INJECTION INTRAVENOUS at 12:20

## 2022-07-25 DIAGNOSIS — D64.9 ANEMIA, UNSPECIFIED TYPE: ICD-10-CM

## 2022-07-25 LAB
FERRITIN: 177.2 NG/ML (ref 13–150)
FOLATE: 9 NG/ML (ref 4.8–37.3)
IRON SATURATION: 37 % (ref 14–50)
IRON: 108 UG/DL (ref 37–145)
TOTAL IRON BINDING CAPACITY: 290 UG/DL (ref 250–400)
VITAMIN B-12: 323 PG/ML (ref 211–946)

## 2022-09-12 ENCOUNTER — OFFICE VISIT (OUTPATIENT)
Dept: FAMILY MEDICINE CLINIC | Age: 77
End: 2022-09-12
Payer: MEDICARE

## 2022-09-12 VITALS
TEMPERATURE: 97.4 F | RESPIRATION RATE: 20 BRPM | WEIGHT: 153 LBS | OXYGEN SATURATION: 97 % | BODY MASS INDEX: 27.1 KG/M2 | SYSTOLIC BLOOD PRESSURE: 132 MMHG | HEART RATE: 116 BPM | DIASTOLIC BLOOD PRESSURE: 76 MMHG

## 2022-09-12 DIAGNOSIS — Z87.19 HISTORY OF DIVERTICULITIS: ICD-10-CM

## 2022-09-12 DIAGNOSIS — R10.32 LEFT LOWER QUADRANT ABDOMINAL PAIN: ICD-10-CM

## 2022-09-12 DIAGNOSIS — R10.32 LEFT LOWER QUADRANT ABDOMINAL PAIN: Primary | ICD-10-CM

## 2022-09-12 LAB
BASOPHILS ABSOLUTE: 0.1 K/UL (ref 0–0.2)
BASOPHILS RELATIVE PERCENT: 0.8 % (ref 0–1)
EOSINOPHILS ABSOLUTE: 0.1 K/UL (ref 0–0.6)
EOSINOPHILS RELATIVE PERCENT: 1.6 % (ref 0–5)
HCT VFR BLD CALC: 44.2 % (ref 37–47)
HEMOGLOBIN: 13.5 G/DL (ref 12–16)
IMMATURE GRANULOCYTES #: 0 K/UL
LYMPHOCYTES ABSOLUTE: 2 K/UL (ref 1.1–4.5)
LYMPHOCYTES RELATIVE PERCENT: 27 % (ref 20–40)
MCH RBC QN AUTO: 31.9 PG (ref 27–31)
MCHC RBC AUTO-ENTMCNC: 30.5 G/DL (ref 33–37)
MCV RBC AUTO: 104.5 FL (ref 81–99)
MONOCYTES ABSOLUTE: 0.6 K/UL (ref 0–0.9)
MONOCYTES RELATIVE PERCENT: 8.8 % (ref 0–10)
NEUTROPHILS ABSOLUTE: 4.5 K/UL (ref 1.5–7.5)
NEUTROPHILS RELATIVE PERCENT: 61.3 % (ref 50–65)
PDW BLD-RTO: 12.6 % (ref 11.5–14.5)
PLATELET # BLD: 223 K/UL (ref 130–400)
PMV BLD AUTO: 9.2 FL (ref 9.4–12.3)
RBC # BLD: 4.23 M/UL (ref 4.2–5.4)
WBC # BLD: 7.3 K/UL (ref 4.8–10.8)

## 2022-09-12 PROCEDURE — 3017F COLORECTAL CA SCREEN DOC REV: CPT | Performed by: NURSE PRACTITIONER

## 2022-09-12 PROCEDURE — 99213 OFFICE O/P EST LOW 20 MIN: CPT | Performed by: NURSE PRACTITIONER

## 2022-09-12 PROCEDURE — G8417 CALC BMI ABV UP PARAM F/U: HCPCS | Performed by: NURSE PRACTITIONER

## 2022-09-12 PROCEDURE — 1036F TOBACCO NON-USER: CPT | Performed by: NURSE PRACTITIONER

## 2022-09-12 PROCEDURE — 1123F ACP DISCUSS/DSCN MKR DOCD: CPT | Performed by: NURSE PRACTITIONER

## 2022-09-12 PROCEDURE — G8427 DOCREV CUR MEDS BY ELIG CLIN: HCPCS | Performed by: NURSE PRACTITIONER

## 2022-09-12 PROCEDURE — 1090F PRES/ABSN URINE INCON ASSESS: CPT | Performed by: NURSE PRACTITIONER

## 2022-09-12 PROCEDURE — G8399 PT W/DXA RESULTS DOCUMENT: HCPCS | Performed by: NURSE PRACTITIONER

## 2022-09-12 RX ORDER — CIPROFLOXACIN 500 MG/1
500 TABLET, FILM COATED ORAL 2 TIMES DAILY
Qty: 20 TABLET | Refills: 0 | Status: SHIPPED | OUTPATIENT
Start: 2022-09-12 | End: 2022-09-22

## 2022-09-12 RX ORDER — METRONIDAZOLE 500 MG/1
500 TABLET ORAL 3 TIMES DAILY
Qty: 30 TABLET | Refills: 0 | Status: SHIPPED | OUTPATIENT
Start: 2022-09-12 | End: 2022-09-22

## 2022-09-12 RX ORDER — DICYCLOMINE HYDROCHLORIDE 10 MG/1
10 CAPSULE ORAL 4 TIMES DAILY
Qty: 120 CAPSULE | Refills: 0 | Status: SHIPPED | OUTPATIENT
Start: 2022-09-12

## 2022-09-12 ASSESSMENT — ENCOUNTER SYMPTOMS
ABDOMINAL DISTENTION: 1
ABDOMINAL PAIN: 1
CONSTIPATION: 0
DIARRHEA: 0
NAUSEA: 0
VOMITING: 0

## 2022-09-12 NOTE — PROGRESS NOTES
E11.9 Yes TREVON Cameron   metoprolol succinate (TOPROL XL) 25 MG extended release tablet Take 1 tablet by mouth nightly Yes TREVON Cameron   blood glucose test strips (ONE TOUCH ULTRA TEST) strip USE TO TEST BLOOD SUGAR TWICE DAILY E11.9 Yes TREVON Cameron   UNABLE TO FIND Test strips insurance preferred  2 times a day FBS and 2 hour after meal  E11.9 Yes TREVON Cameron   UNABLE TO FIND E11.9  glucomter Device  Insurance preference Yes TREVON Cameron   Lancets MISC 2 times a day Yes TREVON Cameron   aspirin 81 MG tablet Take 81 mg by mouth daily Yes Historical Provider, MD   Ketoprofen  MG CP24 Take 200 mg by mouth as needed Yes Historical Provider, MD      No Known Allergies    Review of Systems   Constitutional:  Negative for fever. Gastrointestinal:  Positive for abdominal distention and abdominal pain. Negative for constipation, diarrhea, nausea and vomiting. OBJECTIVE:    Physical Exam  Constitutional:       Appearance: Normal appearance. She is well-developed. She is not ill-appearing. HENT:      Head: Normocephalic and atraumatic. Right Ear: External ear normal.      Left Ear: External ear normal.      Nose: Nose normal.      Mouth/Throat:      Lips: Pink. Mouth: Mucous membranes are moist.   Eyes:      General: Lids are normal.      Extraocular Movements: Extraocular movements intact. Conjunctiva/sclera: Conjunctivae normal.   Cardiovascular:      Rate and Rhythm: Normal rate and regular rhythm. Heart sounds: Normal heart sounds. No murmur heard. Pulmonary:      Effort: Pulmonary effort is normal.      Breath sounds: Normal breath sounds. Abdominal:      General: There is distension. Palpations: Abdomen is soft. Tenderness: There is abdominal tenderness in the left lower quadrant. Comments: Bs increaed right side and decreased over the llq. Musculoskeletal:      Cervical back: Normal range of motion. Skin:     General: Skin is warm and dry. Neurological:      Mental Status: She is alert and oriented to person, place, and time. Motor: No weakness or tremor. Coordination: Coordination is intact. Psychiatric:         Attention and Perception: Attention and perception normal.         Mood and Affect: Mood normal.         Speech: Speech normal.         Behavior: Behavior normal. Behavior is cooperative. Thought Content: Thought content normal.         Cognition and Memory: Cognition and memory normal.         Judgment: Judgment normal.      /76 (Site: Left Upper Arm, Position: Sitting, Cuff Size: Medium Adult)   Pulse (!) 116   Temp 97.4 °F (36.3 °C) (Temporal)   Resp 20   Wt 153 lb (69.4 kg)   SpO2 97%   BMI 27.10 kg/m²      ASSESSMENT:      ICD-10-CM    1. Left lower quadrant abdominal pain  R10.32 CT ABDOMEN PELVIS W IV CONTRAST Additional Contrast? Radiologist Recommendation     CBC with Auto Differential     dicyclomine (BENTYL) 10 MG capsule     ciprofloxacin (CIPRO) 500 MG tablet     metroNIDAZOLE (FLAGYL) 500 MG tablet      2. History of diverticulitis  Z87.19 CT ABDOMEN PELVIS W IV CONTRAST Additional Contrast? Radiologist Recommendation     CBC with Auto Differential     dicyclomine (BENTYL) 10 MG capsule     ciprofloxacin (CIPRO) 500 MG tablet     metroNIDAZOLE (FLAGYL) 500 MG tablet          PLAN:    Claven Searing: Abdominal Pain (Lower abdominal pain . LLQ . Has been taking extra strength Tylenol today pain is better )  Clear to full liquids     Diagnosis and orders for this visit are above.

## 2022-09-13 ENCOUNTER — HOSPITAL ENCOUNTER (OUTPATIENT)
Dept: GENERAL RADIOLOGY | Age: 77
Discharge: HOME | End: 2022-09-13
Payer: MEDICARE

## 2022-09-13 DIAGNOSIS — Z87.19 HISTORY OF DIVERTICULITIS: ICD-10-CM

## 2022-09-13 DIAGNOSIS — R10.32 LEFT LOWER QUADRANT ABDOMINAL PAIN: ICD-10-CM

## 2022-09-13 PROCEDURE — 74177 CT ABD & PELVIS W/CONTRAST: CPT | Performed by: RADIOLOGY

## 2022-09-13 PROCEDURE — 74177 CT ABD & PELVIS W/CONTRAST: CPT

## 2022-09-13 PROCEDURE — A4641 RADIOPHARM DX AGENT NOC: HCPCS | Performed by: NURSE PRACTITIONER

## 2022-09-13 PROCEDURE — 6360000004 HC RX CONTRAST MEDICATION: Performed by: NURSE PRACTITIONER

## 2022-09-13 RX ADMIN — BARIUM SULFATE 450 ML: 21 SUSPENSION ORAL at 12:34

## 2022-09-13 RX ADMIN — IOPAMIDOL 80 ML: 755 INJECTION, SOLUTION INTRAVENOUS at 12:34

## 2022-09-29 ENCOUNTER — OFFICE VISIT (OUTPATIENT)
Dept: FAMILY MEDICINE CLINIC | Age: 77
End: 2022-09-29
Payer: MEDICARE

## 2022-09-29 VITALS
RESPIRATION RATE: 20 BRPM | DIASTOLIC BLOOD PRESSURE: 80 MMHG | OXYGEN SATURATION: 97 % | TEMPERATURE: 97.7 F | SYSTOLIC BLOOD PRESSURE: 124 MMHG | HEART RATE: 103 BPM

## 2022-09-29 DIAGNOSIS — K57.92 DIVERTICULITIS: Primary | ICD-10-CM

## 2022-09-29 PROCEDURE — 1036F TOBACCO NON-USER: CPT | Performed by: NURSE PRACTITIONER

## 2022-09-29 PROCEDURE — G8427 DOCREV CUR MEDS BY ELIG CLIN: HCPCS | Performed by: NURSE PRACTITIONER

## 2022-09-29 PROCEDURE — 3017F COLORECTAL CA SCREEN DOC REV: CPT | Performed by: NURSE PRACTITIONER

## 2022-09-29 PROCEDURE — 1090F PRES/ABSN URINE INCON ASSESS: CPT | Performed by: NURSE PRACTITIONER

## 2022-09-29 PROCEDURE — G8417 CALC BMI ABV UP PARAM F/U: HCPCS | Performed by: NURSE PRACTITIONER

## 2022-09-29 PROCEDURE — G8399 PT W/DXA RESULTS DOCUMENT: HCPCS | Performed by: NURSE PRACTITIONER

## 2022-09-29 PROCEDURE — 99213 OFFICE O/P EST LOW 20 MIN: CPT | Performed by: NURSE PRACTITIONER

## 2022-09-29 PROCEDURE — 1123F ACP DISCUSS/DSCN MKR DOCD: CPT | Performed by: NURSE PRACTITIONER

## 2022-09-29 RX ORDER — AZITHROMYCIN 500 MG/1
500 TABLET, FILM COATED ORAL DAILY
Qty: 3 TABLET | Refills: 0 | Status: SHIPPED | OUTPATIENT
Start: 2022-09-29 | End: 2022-10-04

## 2022-09-29 RX ORDER — METRONIDAZOLE 500 MG/1
500 TABLET ORAL 3 TIMES DAILY
Qty: 30 TABLET | Refills: 0 | Status: SHIPPED | OUTPATIENT
Start: 2022-09-29 | End: 2022-10-09

## 2022-09-29 ASSESSMENT — ENCOUNTER SYMPTOMS
NAUSEA: 0
ABDOMINAL PAIN: 1
DIARRHEA: 0
VOMITING: 0
CONSTIPATION: 0

## 2022-09-29 NOTE — PROGRESS NOTES
SUBJECTIVE:  Follow up on Stomach Pain. Patient ID: Marquis Mckeon is a 77 y.o. female. HPI:   HPI   Ms. Hina Medina lady comes in today for follow-up on her stomach pain is much better than it was we had started her on Cipro but is still present she did do the CT which we have reviewed all of those test results that showed groundglass lung opacities in her lower lobe which she did have positive for COVID at the time her abdomen showed cholelithiasis which is like sludging of the gallbladder she is not having any symptoms of that fatty liver no symptoms there we did discuss losing some weight watching the cholesterol and also over on her left-sided did show some enteritis which is what we are treating she said many years ago she was treated with azithromycin because she she continued to have this type of pain and had done a regular treatment but then did not improve so Dr. Holger Watkins ended up giving her 5 or 10 days of azithromycin. Due to the continued pain she would like to try this  Prior studies: Ultrasound of the liver dated 5/6/2019. CT of the abdomen and pelvis dated 7/10/2017. Findings: Lung bases: Interval scattered moderate bilateral ground-glass and interstitial lung opacities at the bases, may be atelectasis/scarring/edema, cannot exclude a component of atypical/viral pneumonia, or it sequelae, correlate clinically. Status    post right mastectomy. Indeterminate architectural distortion in the left breast, grossly as before. Tiny sliding hiatal hernia. Liver:Fatty liver, as before. Grossly stable multiple liver cysts, including a large, mildly complex cyst in the anterior liver. Gallbladder Fossa: Cholelithiasis. Spleen: Grossly unremarkable. Pancreas:  Grossly unremarkable. Adrenal glands: Grossly unremarkable size, contour and density. Kidneys: In anatomic position. Grossly unremarkablerenal size, contour and density. No renal or ureteral calculi. No evidence of a renal mass.  Tiny left renal cyst, peers new from prior. Perinephric space is unremarkable. Retroperitoneum: No enlarged retroperitoneal lymphadenopathy. The aorta and IVC appear unremarkable. Peritoneal cavity: No evidence of free air or ascites. Gastrointestinal tract: No obstruction. Mild inflammatory changes in the pericolonic fat anteriorly and to the distal left colon, image 47, consistent with epiploic appendagitis. Scattered mild colonic thickening, greater distally, may be    underdistention, cannot exclude a low-grade colitis. Diverticulosis, with otherwise no focal diverticulitis. No abscess. Status post interval right hemicolectomy. Appendix: Surgically absent. Pelvis: Solid and hollow viscera grossly unremarkable. Osseous structures: No acute or destructive bony process identified. Scattered spondylosis, DJD. Impression   1. Findings consistent with left-sided epiploic appendagitis, as described. 2. Scattered mild distal colonic thickening, versus underdistension, as above. Diverticulosis, with otherwise no focal diverticulitis. No abscess. Status post interval right hemicolectomy. 3. Nonspecific bilateral lung opacities, as discussed above. 4. Grossly stable liver findings, as above. 5. Cholelithiasis. 6. Other findings, as above. Please see comments above. Recommendation: Follow up as clinically indicated. All CT scans at this facility utilize dose modulation, iterative reconstruction, and/or weight based dosing when appropriate to reduce radiation dose to as low as reasonably achievable.    Electronically Signed by Issac May MD at 19-Sep-2022 05:59:12 AM              Past Medical History:   Diagnosis Date    Breast cancer ([address]     Cancer ([address]     breast right breast    Diabetes type 2, controlled ([address]     Encounter for central line care 6/21/2021    H/O screening mammography 12/06/2018    LEFT BR 2 @     History of therapeutic radiation 2000    Hx antineoplastic chemo 2000 Hypertension     Osteoarthritis     Osteopenia     on Evista      Prior to Visit Medications    Medication Sig Taking? Authorizing Provider   metroNIDAZOLE (FLAGYL) 500 MG tablet Take 1 tablet by mouth 3 times daily for 10 days Yes TREVON Zuniga   azithromycin (ZITHROMAX) 500 MG tablet Take 1 tablet by mouth daily for 5 days Yes TREVON Zuniga   dicyclomine (BENTYL) 10 MG capsule Take 1 capsule by mouth 4 times daily  TREVON Zuniga   raloxifene (EVISTA) 60 MG tablet Take 1 tablet by mouth once daily  TREVON Zuniga   glimepiride (AMARYL) 2 MG tablet TAKE 1 TABLET BY MOUTH ONCE DAILY IN THE MORNING BEFORE BREAKFAST  TREVON Zuniga   furosemide (LASIX) 20 MG tablet Take 1 tablet by mouth daily  TREVON Zuniga   lisinopril (PRINIVIL;ZESTRIL) 5 MG tablet Take 1 tablet by mouth 2 times daily  TREVON Zuniga   metFORMIN (GLUCOPHAGE) 1000 MG tablet TAKE 1 TABLET BY MOUTH TWICE DAILY WITH MEALS  TREVON Zuniga   blood glucose test strips (ASCENSIA AUTODISC VI;ONE TOUCH ULTRA TEST VI) strip 1 each by In Vitro route daily Daily . One touch ultra mini E11.9  TREVON Zuniga   metoprolol succinate (TOPROL XL) 25 MG extended release tablet Take 1 tablet by mouth nightly  TREVON Zuniga   blood glucose test strips (ONE TOUCH ULTRA TEST) strip USE TO TEST BLOOD SUGAR TWICE DAILY E11.9  TREVON Zuniga   UNABLE TO FIND Test strips insurance preferred  2 times a day FBS and 2 hour after meal  E11.9  TREVON Zuniga   UNABLE TO FIND E11.9  glucomter Device  Insurance preference  TREVON Zuniga   Lancets MISC 2 times a day  TREVON Zuniga   aspirin 81 MG tablet Take 81 mg by mouth daily  Historical Provider, MD   Ketoprofen  MG CP24 Take 200 mg by mouth as needed  Historical Provider, MD      No Known Allergies    Review of Systems   Constitutional:  Negative for appetite change, fatigue and fever.    Gastrointestinal: Positive for abdominal pain. Negative for constipation, diarrhea, nausea and vomiting. OBJECTIVE:    Physical Exam  Constitutional:       Appearance: Normal appearance. She is well-developed. She is not ill-appearing. HENT:      Head: Normocephalic and atraumatic. Right Ear: External ear normal.      Left Ear: External ear normal.      Nose: Nose normal.      Mouth/Throat:      Lips: Pink. Mouth: Mucous membranes are moist.   Eyes:      General: Lids are normal.      Extraocular Movements: Extraocular movements intact. Conjunctiva/sclera: Conjunctivae normal.   Cardiovascular:      Rate and Rhythm: Normal rate and regular rhythm. Heart sounds: Normal heart sounds. No murmur heard. Pulmonary:      Effort: Pulmonary effort is normal.      Breath sounds: Normal breath sounds. Abdominal:      General: Abdomen is flat. Bowel sounds are normal.      Palpations: Abdomen is soft. Tenderness: There is abdominal tenderness in the left lower quadrant. Musculoskeletal:      Cervical back: Normal range of motion. Skin:     General: Skin is warm and dry. Neurological:      Mental Status: She is alert and oriented to person, place, and time. Motor: No weakness or tremor. Coordination: Coordination is intact. Psychiatric:         Attention and Perception: Attention and perception normal.         Mood and Affect: Mood normal.         Speech: Speech normal.         Behavior: Behavior normal. Behavior is cooperative. Thought Content: Thought content normal.         Cognition and Memory: Cognition and memory normal.         Judgment: Judgment normal.      /80 (Site: Left Upper Arm, Position: Sitting, Cuff Size: Large Adult)   Pulse (!) 103   Temp 97.7 °F (36.5 °C) (Temporal)   Resp 20   SpO2 97%      ASSESSMENT:      ICD-10-CM    1.  Diverticulitis  K57.92 metroNIDAZOLE (FLAGYL) 500 MG tablet     azithromycin (ZITHROMAX) 500 MG tablet          PLAN:    David Dao: Abdominal Pain (Still having pain on left side . She now remembers seeing another Dr a long time ago he diagnosed it as inflamed bowel duct. He put her on a medication for it  and it went away . Flagyl and azithromycin . )      Diagnosis and orders for this visit are above.

## 2022-11-21 ENCOUNTER — TELEPHONE (OUTPATIENT)
Dept: FAMILY MEDICINE CLINIC | Age: 77
End: 2022-11-21

## 2022-11-21 DIAGNOSIS — M54.30 SCIATIC NERVE PAIN, UNSPECIFIED LATERALITY: Primary | ICD-10-CM

## 2022-11-21 NOTE — TELEPHONE ENCOUNTER
Natalia Vasquez is down in her back . Wanting a referral to [address] [place] [address]. She can barely move and wants to get relief.

## 2022-11-28 ENCOUNTER — TELEPHONE (OUTPATIENT)
Dept: FAMILY MEDICINE CLINIC | Age: 77
End: 2022-11-28

## 2022-11-28 NOTE — TELEPHONE ENCOUNTER
I spoke to Jim . She was taking ibuprofen 800 and she is out. Also she is needing a muscle relaxer . [facility].

## 2022-11-28 NOTE — TELEPHONE ENCOUNTER
----- Message from Yokasta Mcleod sent at 11/28/2022 11:35 AM CST -----  Subject: Message to Provider    QUESTIONS  Information for Provider? pt would like to have medication for pain and a   muscle relaxer sent to [facility] [address] [phone] for her Sciatica pain she   would like contacted.   ---------------------------------------------------------------------------  --------------  Elena Duckworth [facility]  [phone]; OK to leave message on voicemail  ---------------------------------------------------------------------------  --------------  SCRIPT ANSWERS  Relationship to Patient?  Self

## 2022-11-29 ENCOUNTER — TELEPHONE (OUTPATIENT)
Dept: FAMILY MEDICINE CLINIC | Age: 77
End: 2022-11-29

## 2022-11-29 RX ORDER — IBUPROFEN 800 MG/1
800 TABLET ORAL 2 TIMES DAILY PRN
Qty: 180 TABLET | Refills: 1 | Status: SHIPPED | OUTPATIENT
Start: 2022-11-29

## 2022-11-29 RX ORDER — TIZANIDINE 4 MG/1
4 TABLET ORAL 3 TIMES DAILY PRN
Qty: 30 TABLET | Refills: 0 | Status: SHIPPED | OUTPATIENT
Start: 2022-11-29

## 2022-11-29 NOTE — TELEPHONE ENCOUNTER
----- Message from Varsha Hoff sent at 11/28/2022 11:35 AM CST -----  Subject: Message to Provider    QUESTIONS  Information for Provider? pt would like to have medication for pain and a   muscle relaxer sent to [facility] [address] [facility],[address] [place] [address] [phone] for her Sciatica pain she   would like contacted.   ---------------------------------------------------------------------------  --------------  Anoop HERRON  [phone]; OK to leave message on voicemail  ---------------------------------------------------------------------------  --------------  SCRIPT ANSWERS  Relationship to Patient?  Self

## 2023-01-03 DIAGNOSIS — E11.9 TYPE 2 DIABETES MELLITUS WITHOUT COMPLICATION, WITHOUT LONG-TERM CURRENT USE OF INSULIN (HCC): ICD-10-CM

## 2023-01-03 LAB
ALBUMIN SERPL-MCNC: 4.1 G/DL (ref 3.5–5.2)
ALP BLD-CCNC: 67 U/L (ref 35–104)
ALT SERPL-CCNC: 22 U/L (ref 5–33)
ANION GAP SERPL CALCULATED.3IONS-SCNC: 15 MMOL/L (ref 7–19)
AST SERPL-CCNC: 21 U/L (ref 5–32)
BASOPHILS ABSOLUTE: 0.1 K/UL (ref 0–0.2)
BASOPHILS RELATIVE PERCENT: 0.9 % (ref 0–1)
BILIRUB SERPL-MCNC: 0.3 MG/DL (ref 0.2–1.2)
BUN BLDV-MCNC: 12 MG/DL (ref 8–23)
CALCIUM SERPL-MCNC: 9.3 MG/DL (ref 8.8–10.2)
CHLORIDE BLD-SCNC: 98 MMOL/L (ref 98–111)
CO2: 26 MMOL/L (ref 22–29)
CREAT SERPL-MCNC: 0.6 MG/DL (ref 0.5–0.9)
EOSINOPHILS ABSOLUTE: 0.1 K/UL (ref 0–0.6)
EOSINOPHILS RELATIVE PERCENT: 1.7 % (ref 0–5)
GFR SERPL CREATININE-BSD FRML MDRD: >60 ML/MIN/{1.73_M2}
GLUCOSE BLD-MCNC: 141 MG/DL (ref 74–109)
HBA1C MFR BLD: 7.9 % (ref 4–6)
HCT VFR BLD CALC: 42.3 % (ref 37–47)
HEMOGLOBIN: 13.5 G/DL (ref 12–16)
IMMATURE GRANULOCYTES #: 0 K/UL
LYMPHOCYTES ABSOLUTE: 2.4 K/UL (ref 1.1–4.5)
LYMPHOCYTES RELATIVE PERCENT: 35.1 % (ref 20–40)
MCH RBC QN AUTO: 33 PG (ref 27–31)
MCHC RBC AUTO-ENTMCNC: 31.9 G/DL (ref 33–37)
MCV RBC AUTO: 103.4 FL (ref 81–99)
MONOCYTES ABSOLUTE: 0.7 K/UL (ref 0–0.9)
MONOCYTES RELATIVE PERCENT: 9.8 % (ref 0–10)
NEUTROPHILS ABSOLUTE: 3.6 K/UL (ref 1.5–7.5)
NEUTROPHILS RELATIVE PERCENT: 52.1 % (ref 50–65)
PDW BLD-RTO: 13 % (ref 11.5–14.5)
PLATELET # BLD: 239 K/UL (ref 130–400)
PMV BLD AUTO: 9.4 FL (ref 9.4–12.3)
POTASSIUM SERPL-SCNC: 4.2 MMOL/L (ref 3.5–5)
RBC # BLD: 4.09 M/UL (ref 4.2–5.4)
SODIUM BLD-SCNC: 139 MMOL/L (ref 136–145)
TOTAL PROTEIN: 7.3 G/DL (ref 6.6–8.7)
TSH REFLEX FT4: 2.4 UIU/ML (ref 0.35–5.5)
WBC # BLD: 6.9 K/UL (ref 4.8–10.8)

## 2023-01-09 ENCOUNTER — TELEPHONE (OUTPATIENT)
Dept: FAMILY MEDICINE CLINIC | Age: 78
End: 2023-01-09

## 2023-01-09 NOTE — TELEPHONE ENCOUNTER
Pt left message requesting an appointment, called her back to schedule and she did not answer so I left voicemail.

## 2023-01-10 ENCOUNTER — TELEPHONE (OUTPATIENT)
Dept: FAMILY MEDICINE CLINIC | Age: 78
End: 2023-01-10

## 2023-01-10 NOTE — TELEPHONE ENCOUNTER
----- Message from TREVON Dubon sent at 1/9/2023  8:13 AM CST -----  A1c has increased to 7.9 it has been 6.3 please talk to her see if she kind got off her diet for the holidays if that might be the reason if it is not we may have to make medication changes  TSH is in the normal range normal kidney function and liver function  A CBC still shows an elevated MCV but it is better than it has been red blood cells are also a little lower she taking I would still recommend like a B complex vitamin

## 2023-01-11 ENCOUNTER — TELEPHONE (OUTPATIENT)
Dept: FAMILY MEDICINE CLINIC | Age: 78
End: 2023-01-11

## 2023-01-12 ENCOUNTER — OFFICE VISIT (OUTPATIENT)
Dept: FAMILY MEDICINE CLINIC | Age: 78
End: 2023-01-12
Payer: MEDICARE

## 2023-01-12 VITALS
DIASTOLIC BLOOD PRESSURE: 70 MMHG | HEIGHT: 63 IN | RESPIRATION RATE: 17 BRPM | HEART RATE: 107 BPM | TEMPERATURE: 98.5 F | SYSTOLIC BLOOD PRESSURE: 120 MMHG | BODY MASS INDEX: 27.64 KG/M2 | WEIGHT: 156 LBS | OXYGEN SATURATION: 97 %

## 2023-01-12 DIAGNOSIS — E11.9 TYPE 2 DIABETES MELLITUS WITHOUT COMPLICATION, WITHOUT LONG-TERM CURRENT USE OF INSULIN (HCC): ICD-10-CM

## 2023-01-12 DIAGNOSIS — C50.911 MALIGNANT NEOPLASM OF RIGHT FEMALE BREAST, UNSPECIFIED ESTROGEN RECEPTOR STATUS, UNSPECIFIED SITE OF BREAST (HCC): ICD-10-CM

## 2023-01-12 DIAGNOSIS — R10.30 LOWER ABDOMINAL PAIN: ICD-10-CM

## 2023-01-12 DIAGNOSIS — G89.29 OTHER CHRONIC PAIN: ICD-10-CM

## 2023-01-12 DIAGNOSIS — M54.30 SCIATIC NERVE PAIN, UNSPECIFIED LATERALITY: Primary | ICD-10-CM

## 2023-01-12 DIAGNOSIS — K57.92 DIVERTICULITIS: ICD-10-CM

## 2023-01-12 DIAGNOSIS — I10 ESSENTIAL HYPERTENSION: ICD-10-CM

## 2023-01-12 PROCEDURE — 3078F DIAST BP <80 MM HG: CPT | Performed by: NURSE PRACTITIONER

## 2023-01-12 PROCEDURE — 80305 DRUG TEST PRSMV DIR OPT OBS: CPT | Performed by: NURSE PRACTITIONER

## 2023-01-12 PROCEDURE — 99214 OFFICE O/P EST MOD 30 MIN: CPT | Performed by: NURSE PRACTITIONER

## 2023-01-12 PROCEDURE — 1090F PRES/ABSN URINE INCON ASSESS: CPT | Performed by: NURSE PRACTITIONER

## 2023-01-12 PROCEDURE — 1123F ACP DISCUSS/DSCN MKR DOCD: CPT | Performed by: NURSE PRACTITIONER

## 2023-01-12 PROCEDURE — 3051F HG A1C>EQUAL 7.0%<8.0%: CPT | Performed by: NURSE PRACTITIONER

## 2023-01-12 PROCEDURE — 3074F SYST BP LT 130 MM HG: CPT | Performed by: NURSE PRACTITIONER

## 2023-01-12 PROCEDURE — G8427 DOCREV CUR MEDS BY ELIG CLIN: HCPCS | Performed by: NURSE PRACTITIONER

## 2023-01-12 PROCEDURE — G8399 PT W/DXA RESULTS DOCUMENT: HCPCS | Performed by: NURSE PRACTITIONER

## 2023-01-12 PROCEDURE — G8417 CALC BMI ABV UP PARAM F/U: HCPCS | Performed by: NURSE PRACTITIONER

## 2023-01-12 PROCEDURE — 1036F TOBACCO NON-USER: CPT | Performed by: NURSE PRACTITIONER

## 2023-01-12 PROCEDURE — G8484 FLU IMMUNIZE NO ADMIN: HCPCS | Performed by: NURSE PRACTITIONER

## 2023-01-12 RX ORDER — GLIMEPIRIDE 2 MG/1
TABLET ORAL
Qty: 90 TABLET | Refills: 1 | Status: SHIPPED | OUTPATIENT
Start: 2023-01-12

## 2023-01-12 RX ORDER — GABAPENTIN 100 MG/1
100 CAPSULE ORAL 2 TIMES DAILY
Qty: 180 CAPSULE | Refills: 1 | Status: SHIPPED | OUTPATIENT
Start: 2023-01-12 | End: 2023-07-11

## 2023-01-12 RX ORDER — TIZANIDINE 4 MG/1
4 TABLET ORAL NIGHTLY
Qty: 90 TABLET | Refills: 1 | Status: SHIPPED | OUTPATIENT
Start: 2023-01-12

## 2023-01-12 RX ORDER — FUROSEMIDE 20 MG/1
20 TABLET ORAL DAILY
Qty: 90 TABLET | Refills: 1 | Status: SHIPPED | OUTPATIENT
Start: 2023-01-12

## 2023-01-12 RX ORDER — RALOXIFENE HYDROCHLORIDE 60 MG/1
TABLET, FILM COATED ORAL
Qty: 90 TABLET | Refills: 1 | Status: SHIPPED | OUTPATIENT
Start: 2023-01-12

## 2023-01-12 RX ORDER — LISINOPRIL 5 MG/1
5 TABLET ORAL 2 TIMES DAILY
Qty: 180 TABLET | Refills: 1 | Status: SHIPPED | OUTPATIENT
Start: 2023-01-12

## 2023-01-12 ASSESSMENT — ENCOUNTER SYMPTOMS
RESPIRATORY NEGATIVE: 1
BACK PAIN: 1
ABDOMINAL PAIN: 1
TROUBLE SWALLOWING: 0
CONSTIPATION: 0
DIARRHEA: 0

## 2023-01-12 NOTE — LETTER
CONTROLLED SUBSTANCE MEDICATION AGREEMENT     Patient Name: Brian Feliz  Patient YOB: 1945   I understand, that controlled substance medications may be used to help better manage my symptoms and to improve my ability to function at home, work and in social settings. However, I also understand that these medications do have risks, which have been discussed with me, including possible development of physical or psychological dependence. I understand that successful treatment requires mutual trust and honesty between me and my provider. I understand and agree that following this Medication Agreement is necessary in continuing my provider-patient relationship and the success of my treatment plan. Explanation from my Provider: Benefits and Goals of Controlled Substance Medications: There are two potential goals for your treatment: (1) decreased pain and suffering (2) improved daily life functions. There are many possible treatments for your chronic condition(s). Alternatives such as physical therapy, yoga, massage, home daily exercise, meditation, relaxation techniques, injections, chiropractic manipulations, surgery, cognitive therapy, hypnosis and many medications that are not habit-forming may be used. Use of controlled substance medications may be helpful, but they are unlikely to resolve all symptoms or restore all function. Explanation from my Provider: Risks of Controlled Substance Medications:  Opioid pain medications: These medications can lead to problems such as addiction/dependence, sedation, lightheadedness/dizziness, memory issues, falls, constipation, nausea, or vomiting. They may also impair the ability to drive or operate machinery. Additionally, these medications may lower testosterone levels, leading to loss of bone strength, stamina and sex drive.   They may cause problems with breathing, sleep apnea and reduced coughing, which is especially dangerous for patients with lung disease.  Overdose or dangerous interactions with alcohol and other medications may occur, leading to death.  Hyperalgesia may develop, which means patients receiving opioids for the treatment of pain may become more sensitive to certain painful stimuli, and in some cases, experience pain from ordinarily non-painful stimuli.  Women between the ages of 14-55 who could become pregnant should carefully weigh the risks and benefits of opioids with their physicians, as these medications increase the risk of pregnancy complications, including miscarriage,  delivery and stillbirth.  It is also possible for babies to be born addicted to opioids.  Opioid dependence withdrawal symptoms may include; feelings of uneasiness, increased pain, irritability, belly pain, diarrhea, sweats and goose-flesh.     Benzodiazepines and non-benzodiazepine sleep medications:  These medications can lead to problems such as addiction/dependence, sedation, fatigue, lightheadedness, dizziness, incoordination, falls, depression, hallucinations, and impaired judgment, memory and concentration.  The ability to drive and operate machinery may also be affected.  Abnormal sleep-related behaviors have been reported, including sleepwalking, driving, making telephone calls, eating, or having sex while not fully awake.  These medications can suppress breathing and worsen sleep apnea, particularly when combined with alcohol or other sedating medications, potentially leading to death. Dependence withdrawal symptoms may include tremors, anxiety, hallucinations and seizures.  Stimulants:  Common adverse effects include addiction/dependence, increased blood  pressure and heart rate, decreased appetite, nausea, involuntary weight loss, insomnia,                                                                                                                     Initials:_______   irritability, and headaches.  These risks may increase when these  medications are combined with other stimulants, such as caffeine pills or energy drinks, certain weight loss supplements and oral decongestants. Dependence withdrawal symptoms may include depressed mood, loss of interest, suicidal thoughts, anxiety, fatigue, appetite changes and agitation. Testosterone replacement therapy:  Potential side effects include increased risk of stroke and heart attack, blood clots, increased blood pressure, increased cholesterol, enlarged prostate, sleep apnea, irritability/aggression and other mood disorders, and decreased fertility. I agree and understand that I and my prescriber have the following rights and responsibilities regarding my treatment plan:     1. MY RIGHTS:  To be informed of my treatment and medication plan. To be an active participant in my health and wellbeing. 2. MY RESPONSIBILITY AND UNDERSTANDING FOR USE OF MEDICATIONS   I will take medications at the dose and frequency as directed. For my safety, I will not increase or change how I take my medications without the recommendation of my healthcare provider.  I will actively participate in any program recommended by my provider which may improve function, including social, physical, psychological programs.  I will not take my medications with alcohol or other drugs not prescribed to me. I understand that drinking alcohol with my medications increases the chances of side effects, including reduced breathing rate and could lead to personal injury when operating machinery.  I understand that if I have a history of substance use disorders, including alcohol or other illicit drugs, that I may be at increased risk of addiction to my medications.  I agree to notify my provider immediately if I should become pregnant so that my treatment plan can be adjusted.    I agree and understand that I shall only receive controlled substance medications from the prescriber that signed this agreement unless there is written agreement among other prescribers of controlled substances outlining the responsibility of the medications being prescribed.  I understand that the if the controlled medication is not helping to achieve goals, the dosage may be tapered and no longer prescribed. 3. MY RESPONSIBILITY FOR COMMUNICATION / PRESCRIPTION RENEWALS   I agree that all controlled substance medications that I take will be prescribed only by my provider. If another healthcare provider prescribes me medication in an emergency, I will notify my provider within seventy-two (72) hours.  I will arrange for refills at the prescribed interval ONLY during regular office hours. I will not ask for refills earlier than agreed, after-hours, on holidays or weekends. Refills may take up to 72 hours for processing and prescriptions to reach the pharmacy.  I will inform my other health care providers that I am taking these medications and of the existence of this [id number]. In the event of an emergency, I will provide the same information to the emergency department prescribers.  I will keep my provider updated on the pharmacy I am using for controlled medication prescription filling. Initials:_______  4. MY RESPONSIBILITY FOR PROTECTING MEDICATIONS   I will protect my prescriptions and medications. I understand that lost or misplaced prescriptions will not be replaced.  I will keep medications only for my own use and will not share them with others. I will keep all medications away from children.  I agree that if my medications are adjusted or discontinued, I will properly dispose of any remaining medications. I understand that I will be required to dispose of any remaining controlled medications as, directed by my prescriber, prior to being provided with any prescriptions for other controlled medications.   Medication drop box locations can be found at: [employer]    5. MY RESPONSIBILITY WITH ILLEGAL DRUGS    I will not use illegal or street drugs or another person's prescription medications not prescribed to me.  If there are identified addiction type symptoms, then referral to a program may be provided by my provider and I agree to follow through with this recommendation. 6. MY RESPONSIBILITY FOR COOPERATION WITH INVESTIGATIONS   I understand that my provider will comply with any applicable law and may discuss my use and/or possible misuse/abuse of controlled substances and alcohol, as appropriate, with any health care provider involved in my care, pharmacist, or legal authority.  I authorize my provider and pharmacy to cooperate fully with law enforcement agencies (as permitted by law) in the investigation of any possible misuse, sale, or other diversion of my controlled substances.  I agree to waive any applicable privilege or right of privacy or confidentiality with respect to these authorizations. 7. PROVIDERS RIGHT TO MONITOR FOR SAFETY: PRESCRIPTION MONITORING / DRUG TESTING   I consent to drug/toxicology screening and will submit to a drug screen upon my providers request to assure I am only taking the prescribed drugs for my safety monitoring. I understand that a drug screen is a laboratory test in which a sample of my urine, blood or saliva is checked to see what drugs I have been taking. This may entail an observed urine specimen, which means that a nurse or other health care provider may watch me provide urine, and I will cooperate if I am asked to provide an observed specimen.  I understand that my provider will check a copy of my State Prescription Monitoring Program () Report in order to safely prescribe medications.  Pill Counts: I consent to pill counts when requested.   I may be asked to bring all my prescribed controlled substance medications, in their original bottles, to all of my scheduled appointments. In addition, my provider may ask me to come to the practice at any time for a random pill count. 8. TERMINATION OF THIS AGREEMENT  For my safety, my prescriber has the right to stop prescribing controlled substance medications and may end this agreement. Initials:_______   Conditions that may result in termination of this agreement:  a. I do not show any improvement in pain, or my activity has not improved. b. I develop rapid tolerance or loss of improvement, as described in my treatment plan.  c. I develop significant side effects from the medication. d. My behavior is not consistent with the responsibilities outlined above, thereby causing safety concerns to continue prescribing controlled substance medications. e. I fail to follow the terms of this agreement. f. Other:____________________________       UNDERSTANDING THIS MEDICATION AGREEMENT:    I have read the above and have had all my questions answered. For chronic disease management, I know that my symptoms can be managed with many types of treatments. A chronic medication trial may be part of my treatment, but I must be an active participant in my care. Medication therapy is only one part of my symptom management plan. In some cases, there may be limited scientific evidence to support the chronic use of certain medications to improve symptoms and daily function. Furthermore, in certain circumstances, there may be scientific information that suggests that the use of chronic controlled substances may worsen my symptoms and increase my risk of unintentional death directly related to this medication therapy. I know that if my provider feels my risk from controlled medications is greater than my benefit, I will have my controlled substance medication(s) compassionately lowered or removed altogether.      I further agree to allow this office to contact my HIPAA contact if there are concerns about my safety and use of the controlled medications. I have agreed to use the prescribed controlled substance medications to me as instructed by my provider and as stated in this Medication Agreement. My initial on each page and my signature below shows that I have read each page and I have had the opportunity to ask questions with answers provided by my provider.     Patient Name (Printed): _____________________________________  Patient Signature:  ______________________   Date: _____________    Prescriber Name (Printed): ___________________________________  Prescriber Signature: _____________________  Date: _____________

## 2023-01-12 NOTE — PROGRESS NOTES
SUBJECTIVE:    Patient ID: Zannie Osgood is a 69 y.o. female. HPI:   HPI   Sciatic nerve problems. MRI of back 1-2 disc spacing are narrowed. Currently taking 800 mg of Ibuprofen 2 times a day. Very sedentary due to back pain. Stomach pain. HTN is controlled in the office.    This lady comes in today needing medication refills she is obtained her labs we have gone over it she has had an increase in her A1c from 6.3-7.9 but she has been unable to walk she has had a lot of [place] pain the last time I saw her she was having diverticulitis and being treated she still complaining of some pain but where she is located that is coming in the lower abdomen she has been taking quite a bit of 800 mg of ibuprofen but she is really not talking about gastritis  What we are looking at is the ibuprofen really is not controlling the nerve pain that sometimes it runs down to her calf area its like a toothache she has been to orthopedic [place] and right now they only do any type of surgery they just want her to keep taking the ibuprofen however its not taking care of the discomfort and the pain Sordo try Neurontin or going to a drug contract [facility] and [facility]  Blood pressure is controlled here in the office she is taking her medications she sees Dr. David Dubose on a regular basis for her history of breast cancer I did ask her to talk to him about the elevated MCV we have done some basic blood work TIBC ferritin and iron levels ferritin was a little elevated but everything else seem to be fine and just get his opinion about the elevation there  Related to the lower abdominal pain we talked a little bit about going ahead and doing a GI consult she says she is due for a colonoscopy so I will place that order for a GI consult and also probably screening colonoscopy  Past Medical History:   Diagnosis Date    Breast cancer ([address]     Cancer ([address]     breast right breast    Diabetes type 2, controlled ([address] Encounter for central line care 6/21/2021    H/O screening mammography 12/06/2018    LEFT BR 2 @LH     History of therapeutic radiation 2000    Hx antineoplastic chemo 2000    Hypertension     Osteoarthritis     Osteopenia     on Evista      Prior to Visit Medications    Medication Sig Taking? Authorizing Provider   tiZANidine (ZANAFLEX) 4 MG tablet Take 1 tablet by mouth at bedtime Yes TREVON Yarbrough   raloxifene (EVISTA) 60 MG tablet Take 1 tablet by mouth once daily Yes TREVON Yarbrough   glimepiride (AMARYL) 2 MG tablet TAKE 1 TABLET BY MOUTH ONCE DAILY IN THE MORNING BEFORE BREAKFAST Yes TREVON Yarbrough   furosemide (LASIX) 20 MG tablet Take 1 tablet by mouth daily Yes TREVON Yarbrough   lisinopril (PRINIVIL;ZESTRIL) 5 MG tablet Take 1 tablet by mouth 2 times daily Yes TREVON Yarbrough   metFORMIN (GLUCOPHAGE) 1000 MG tablet TAKE 1 TABLET BY MOUTH TWICE DAILY WITH MEALS Yes TREVON Yarbrough   gabapentin (NEURONTIN) 100 MG capsule Take 1 capsule by mouth 2 times daily for 180 days. Intended supply: 90 days Max Daily Amount: 200 mg Yes TREVON Yarbrough   ibuprofen (ADVIL;MOTRIN) 800 MG tablet Take 1 tablet by mouth 2 times daily as needed for Pain Yes TREVON Yarbrough   blood glucose test strips (ASCENSIA AUTODISC VI;ONE TOUCH ULTRA TEST VI) strip 1 each by In Vitro route daily Daily .  One touch ultra mini E11.9 Yes TREVON Yarbrough   metoprolol succinate (TOPROL XL) 25 MG extended release tablet Take 1 tablet by mouth nightly Yes TREVON Yarbrough   blood glucose test strips (ONE TOUCH ULTRA TEST) strip USE TO TEST BLOOD SUGAR TWICE DAILY E11.9 Yes TREVON Yarbrough   UNABLE TO FIND Test strips insurance preferred  2 times a day FBS and 2 hour after meal  E11.9 Yes TREVON Yarbrough   UNABLE TO FIND E11.9  glucomter Device  Insurance preference Yes TREVON Yarbrough   Lancets MISC 2 times a day Yes TREVON Yarbrough   aspirin 81 MG tablet Take 81 mg by mouth daily Yes Historical Provider, MD   dicyclomine (BENTYL) 10 MG capsule Take 1 capsule by mouth 4 times daily  [place] Ni, APRN     No Known Allergies    Review of Systems   Constitutional:  Positive for activity change and appetite change. Negative for fever. HENT:  Negative for congestion and trouble swallowing. Respiratory: Negative. Cardiovascular: Negative. Gastrointestinal:  Positive for abdominal pain. Negative for constipation and diarrhea. Genitourinary: Negative. Musculoskeletal:  Positive for back pain and myalgias. Neurological:  Positive for numbness. Psychiatric/Behavioral:  Negative for sleep disturbance. The patient is not nervous/anxious. OBJECTIVE:    Physical Exam  Constitutional:       Appearance: Normal appearance. She is well-developed and well-groomed. HENT:      Head: Normocephalic and atraumatic. Right Ear: Tympanic membrane, ear canal and external ear normal. There is no impacted cerumen. Left Ear: Tympanic membrane, ear canal and external ear normal. There is no impacted cerumen. Nose: Nose normal.      Mouth/Throat:      Lips: Pink. Mouth: Mucous membranes are moist.      Dentition: Normal dentition. Pharynx: Oropharynx is clear. Uvula midline. Eyes:      General: Lids are normal.         Right eye: No discharge. Left eye: No discharge. Extraocular Movements: Extraocular movements intact. Conjunctiva/sclera: Conjunctivae normal.      Right eye: Right conjunctiva is not injected. Left eye: Left conjunctiva is not injected. Pupils: Pupils are equal, round, and reactive to light. Neck:      Thyroid: No thyromegaly. Vascular: No carotid bruit or JVD. Cardiovascular:      Rate and Rhythm: Normal rate and regular rhythm. Pulses: Normal pulses. Carotid pulses are 2+ on the right side and 2+ on the left side.        Radial pulses are 2+ on the right side and 2+ on the left side. Heart sounds: Normal heart sounds, S1 normal and S2 normal. No murmur heard. Pulmonary:      Effort: Pulmonary effort is normal.      Breath sounds: Normal breath sounds. Abdominal:      General: Bowel sounds are normal. There is no distension or abdominal bruit. Palpations: Abdomen is soft. There is no mass. Hernia: No hernia is present. Musculoskeletal:         General: Normal range of motion. Cervical back: Full passive range of motion without pain, normal range of motion and neck supple. Right lower leg: No edema. Left lower leg: No edema. Lymphadenopathy:      Cervical: No cervical adenopathy. Right cervical: No superficial cervical adenopathy. Left cervical: No superficial cervical adenopathy. Upper Body:      Right upper body: No supraclavicular adenopathy. Left upper body: No supraclavicular adenopathy. Skin:     General: Skin is warm and dry. Coloration: Skin is not pale. Findings: No lesion or rash. Nails: There is no clubbing. Neurological:      Mental Status: She is alert and oriented to person, place, and time. Motor: No weakness or tremor. Coordination: Coordination normal.      Deep Tendon Reflexes: Reflexes are normal and symmetric. Psychiatric:         Attention and Perception: Attention normal.         Mood and Affect: Mood normal.         Speech: Speech normal.         Behavior: Behavior normal. Behavior is cooperative. Thought Content:  Thought content normal.         Cognition and Memory: Cognition and memory normal.         Judgment: Judgment normal.      /70   Pulse (!) 107   Temp 98.5 °F (36.9 °C) (Temporal)   Resp 17   Ht 5' 3\" (1.6 m)   Wt 156 lb (70.8 kg)   SpO2 97%   BMI 27.63 kg/m²      ASSESSMENT:      ICD-10-CM    1. Sciatic nerve pain, unspecified laterality  M54.30 tiZANidine (ZANAFLEX) 4 MG tablet     gabapentin (NEURONTIN) 100 MG capsule     POCT Rapid Drug Screen      2. Malignant neoplasm of right female breast, unspecified estrogen receptor status, unspecified site of breast ([facility]  C50.911 raloxifene (EVISTA) 60 MG tablet      3. Type 2 diabetes mellitus without complication, without long-term current use of insulin (HCC)  E11.9 glimepiride (AMARYL) 2 MG tablet     metFORMIN (GLUCOPHAGE) 1000 MG tablet      4. Essential hypertension  I10 furosemide (LASIX) 20 MG tablet     lisinopril (PRINIVIL;ZESTRIL) 5 MG tablet      5. Lower abdominal pain  R10.30 Leta Dozier MD, Gastroenterology, [place]      6. Diverticulitis  Cna Acevedo MD[facility]      7. Other chronic pain   G89.29 POCT Rapid Drug Screen          PLAN:    Margo Jimenez: Discuss Labs and Medication Refill  Reviewed labs in office that were obtained on 1/3/2023 gave her a copy of them renewed her medications tried to give her the direct backline so she could call and leave a message on her phone rather than go through the phone tree referral to GI for her lower abdominal pain and she is also due for colonoscopy renewed her medicine she is going to try to do better on her diet if not then we will have to adjust her diabetic medications blood pressures controlled here in the office were also going to start Neurontin for her pain [place] drug screen [facility]  RTC in 6 months   Diagnosis and orders for thisvisit are above. All patient questions answered. Pt voiced understanding. Reviewedhealth maintenance. Instructed to continue current medications, diet and exercise. Patient agreed with treatment plan. Follow up as directed.

## 2023-02-17 ENCOUNTER — OFFICE VISIT (OUTPATIENT)
Dept: GASTROENTEROLOGY | Age: 78
End: 2023-02-17
Payer: MEDICARE

## 2023-02-17 VITALS
HEIGHT: 63 IN | HEART RATE: 102 BPM | BODY MASS INDEX: 25.87 KG/M2 | OXYGEN SATURATION: 96 % | WEIGHT: 146 LBS | DIASTOLIC BLOOD PRESSURE: 90 MMHG | SYSTOLIC BLOOD PRESSURE: 160 MMHG

## 2023-02-17 DIAGNOSIS — R10.30 LOWER ABDOMINAL PAIN: Primary | ICD-10-CM

## 2023-02-17 PROCEDURE — 3080F DIAST BP >= 90 MM HG: CPT | Performed by: NURSE PRACTITIONER

## 2023-02-17 PROCEDURE — 99203 OFFICE O/P NEW LOW 30 MIN: CPT | Performed by: NURSE PRACTITIONER

## 2023-02-17 PROCEDURE — G8417 CALC BMI ABV UP PARAM F/U: HCPCS | Performed by: NURSE PRACTITIONER

## 2023-02-17 PROCEDURE — G8427 DOCREV CUR MEDS BY ELIG CLIN: HCPCS | Performed by: NURSE PRACTITIONER

## 2023-02-17 PROCEDURE — G8399 PT W/DXA RESULTS DOCUMENT: HCPCS | Performed by: NURSE PRACTITIONER

## 2023-02-17 PROCEDURE — 1123F ACP DISCUSS/DSCN MKR DOCD: CPT | Performed by: NURSE PRACTITIONER

## 2023-02-17 PROCEDURE — 1036F TOBACCO NON-USER: CPT | Performed by: NURSE PRACTITIONER

## 2023-02-17 PROCEDURE — 1090F PRES/ABSN URINE INCON ASSESS: CPT | Performed by: NURSE PRACTITIONER

## 2023-02-17 PROCEDURE — 3077F SYST BP >= 140 MM HG: CPT | Performed by: NURSE PRACTITIONER

## 2023-02-17 PROCEDURE — G8484 FLU IMMUNIZE NO ADMIN: HCPCS | Performed by: NURSE PRACTITIONER

## 2023-02-17 ASSESSMENT — ENCOUNTER SYMPTOMS
DIARRHEA: 0
BLOOD IN STOOL: 0
CONSTIPATION: 0
TROUBLE SWALLOWING: 0
SHORTNESS OF BREATH: 0
ANAL BLEEDING: 0
COUGH: 0
ABDOMINAL PAIN: 1
RECTAL PAIN: 0
NAUSEA: 0
VOMITING: 0
CHOKING: 0
ABDOMINAL DISTENTION: 0

## 2023-02-17 NOTE — PROGRESS NOTES
Subjective:     Patient ID: Esha Esteban is a 69 y.o. female  PCP: TREVON Menjivar  Referring Provider: Doreene Duverney, APRN    HPI  Patient presents to the office today with the following complaints: New Patient and Abdominal Pain  Patient seen in the office today due to lower abd pain, reports this pain started a few years ago and she had a CT scan in 9/13/2022 results below. Patient states she does prefer to see Dr. Meena Madison and I explained to her that he doesn't work at [facility] and she is going to resume her care with Dr. Meena Madison at [facility]. CT Result (most recent):  CT ABDOMEN PELVIS W IV CONTRAST 09/13/2022    Narrative  NO PRIOR REPORT AVAILABLE  Exam: CT OF THE ABDOMEN/PELVIS WITH IV AND ORAL CONTRAST  Clinical data: Mid to left lower quadrant abdominal pain. History of diverticulitis. History of right breast cancer. Technique: Direct contiguous axial CT images were acquired through the abdomen and pelvis with intravenouscontrastusing soft tissue and bone algorithms. Oral contrast was administered. Reformatted/MPR images were performed. Radiation dose: CTDIvol =9.57  mGy, DLP =460.38 mGy x cm. Limitations: None. Prior studies: Ultrasound of the liver dated 5/6/2019. CT of the abdomen and pelvis dated 7/10/2017. Findings: Lung bases: Interval scattered moderate bilateral ground-glass and interstitial lung opacities at the bases, may be atelectasis/scarring/edema, cannot exclude a component of atypical/viral pneumonia, or it sequelae, correlate clinically. Status  post right mastectomy. Indeterminate architectural distortion in the left breast, grossly as before. Tiny sliding hiatal hernia. Liver:Fatty liver, as before. Grossly stable multiple liver cysts, including a large, mildly complex cyst in the anterior liver. Gallbladder Fossa: Cholelithiasis. Spleen: Grossly unremarkable. Pancreas:  Grossly unremarkable.   Adrenal glands: Grossly unremarkable size, contour and density. Kidneys: In anatomic position. Grossly unremarkablerenal size, contour and density. No renal or ureteral calculi. No evidence of a renal mass. Tiny left renal cyst, peers new from prior. Perinephric space is unremarkable. Retroperitoneum: No enlarged retroperitoneal lymphadenopathy. The aorta and IVC appear unremarkable. Peritoneal cavity: No evidence of free air or ascites. Gastrointestinal tract: No obstruction. Mild inflammatory changes in the pericolonic fat anteriorly and to the distal left colon, image 47, consistent with epiploic appendagitis. Scattered mild colonic thickening, greater distally, may be  underdistention, cannot exclude a low-grade colitis. Diverticulosis, with otherwise no focal diverticulitis. No abscess. Status post interval right hemicolectomy. Appendix: Surgically absent. Pelvis: Solid and hollow viscera grossly unremarkable. Osseous structures: No acute or destructive bony process identified. Scattered spondylosis, DJD. Impression  1. Findings consistent with left-sided epiploic appendagitis, as described. 2. Scattered mild distal colonic thickening, versus underdistension, as above. Diverticulosis, with otherwise no focal diverticulitis. No abscess. Status post interval right hemicolectomy. 3. Nonspecific bilateral lung opacities, as discussed above. 4. Grossly stable liver findings, as above. 5. Cholelithiasis. 6. Other findings, as above. Please see comments above. Recommendation: Follow up as clinically indicated. All CT scans at this facility utilize dose modulation, iterative reconstruction, and/or weight based dosing when appropriate to reduce radiation dose to as low as reasonably achievable. Electronically Signed by Zakia Deleon MD at 19-Sep-2022 05:59:12 AM        Assessment:     1.  Lower abdominal pain         Plan:     She prefers to have Dr. Familia Morse do her scopes, she has seen him in the past, therefore she will not be following back up with this office. Orders  No orders of the defined types were placed in this encounter. Medications  No orders of the defined types were placed in this encounter.         Patient History:     Past Medical History:   Diagnosis Date    Breast cancer ([facility]     RIGHT SIDE    Cancer [facility])     breast right breast    Diabetes type 2, controlled ([facility]     Encounter for central line care 2021    H/O screening mammography 2018    LEFT BR 2 @LH     History of therapeutic radiation     Hx antineoplastic chemo     Hypertension     Osteoarthritis     Osteopenia     on Evista       Past Surgical History:   Procedure Laterality Date    BREAST BIOPSY Left     NEGATIVE PER DR. MARTINS     SECTION      COLECTOMY  2020    [facility]    COLONOSCOPY  10/29/2014    Dr Raul Mojica, polyp destroyed, 5 yr recall    COLONOSCOPY  10/04/2021    Dr Lit De Paz end to side ileo colonic anastomosis, intact staple line and healthy appearing mucosa, internal hemorrhoids, diverticulosis, 3 yr recall    COLONOSCOPY  2020    Dr Celina Alvarez, internal hemorrhoids, AP x 2,1 yr recall    MASTECTOMY Right     MASTECTOMY Right 2000    TUBAL LIGATION      TUNNELED VENOUS PORT PLACEMENT         Family History   Problem Relation Age of Onset    Cancer Sister     Cancer Brother     Cancer Maternal Grandmother         OVARIAN    Colon Cancer Neg Hx     Colon Polyps Neg Hx        Social History     Socioeconomic History    Marital status:    Tobacco Use    Smoking status: Never    Smokeless tobacco: Never   Substance and Sexual Activity    Alcohol use: Yes     Comment: OCCASIONALLY    Drug use: No     Social Determinants of Health     Financial Resource Strain: Low Risk     Difficulty of Paying Living Expenses: Not hard at all   Food Insecurity: No Food Insecurity    Worried About Running Out of Food in the Last Year: Never true    Ran Out of Food in the Last Year: Never true       Current Outpatient Medications   Medication Sig Dispense Refill    tiZANidine (ZANAFLEX) 4 MG tablet Take 1 tablet by mouth at bedtime 90 tablet 1    raloxifene (EVISTA) 60 MG tablet Take 1 tablet by mouth once daily 90 tablet 1    glimepiride (AMARYL) 2 MG tablet TAKE 1 TABLET BY MOUTH ONCE DAILY IN THE MORNING BEFORE BREAKFAST 90 tablet 1    furosemide (LASIX) 20 MG tablet Take 1 tablet by mouth daily 90 tablet 1    lisinopril (PRINIVIL;ZESTRIL) 5 MG tablet Take 1 tablet by mouth 2 times daily 180 tablet 1    metFORMIN (GLUCOPHAGE) 1000 MG tablet TAKE 1 TABLET BY MOUTH TWICE DAILY WITH MEALS 180 tablet 1    gabapentin (NEURONTIN) 100 MG capsule Take 1 capsule by mouth 2 times daily for 180 days. Intended supply: 90 days Max Daily Amount: 200 mg 180 capsule 1    ibuprofen (ADVIL;MOTRIN) 800 MG tablet Take 1 tablet by mouth 2 times daily as needed for Pain 180 tablet 1    blood glucose test strips (ASCENSIA AUTODISC VI;ONE TOUCH ULTRA TEST VI) strip 1 each by In Vitro route daily Daily . One touch ultra mini E11.9 100 each 3    metoprolol succinate (TOPROL XL) 25 MG extended release tablet Take 1 tablet by mouth nightly 90 tablet 1    blood glucose test strips (ONE TOUCH ULTRA TEST) strip USE TO TEST BLOOD SUGAR TWICE DAILY E11.9 100 strip 4    UNABLE TO FIND Test strips insurance preferred  2 times a day FBS and 2 hour after meal  E11.9 100 strip 5    UNABLE TO FIND E11.9  glucomter Device  Insurance preference 1 Device 0    Lancets MISC 2 times a day 100 each 5    aspirin 81 MG tablet Take 81 mg by mouth daily       No current facility-administered medications for this visit. No Known Allergies    Review of Systems   Constitutional:  Negative for activity change, appetite change, fatigue, fever and unexpected weight change. HENT:  Negative for trouble swallowing. Respiratory:  Negative for cough, choking and shortness of breath. Cardiovascular:  Negative for chest pain. Gastrointestinal:  Positive for abdominal pain. Negative for abdominal distention, anal bleeding, blood in stool, constipation, diarrhea, nausea, rectal pain and vomiting. Allergic/Immunologic: Negative for food allergies. All other systems reviewed and are negative. Objective:     BP (!) 160/90 (Site: Left Upper Arm)   Pulse (!) 102   Ht 5' 3\" (1.6 m)   Wt 146 lb (66.2 kg)   SpO2 96%   BMI 25.86 kg/m²     Physical Exam  Vitals reviewed. Constitutional:       General: She is not in acute distress. Appearance: She is well-developed. HENT:      Head: Normocephalic and atraumatic. Right Ear: External ear normal.      Left Ear: External ear normal.      Nose: Nose normal.   Eyes:      General: No scleral icterus. Right eye: No discharge. Left eye: No discharge. Conjunctiva/sclera: Conjunctivae normal.      Pupils: Pupils are equal, round, and reactive to light. Cardiovascular:      Rate and Rhythm: Normal rate and regular rhythm. Heart sounds: Normal heart sounds. No murmur heard. Pulmonary:      Effort: Pulmonary effort is normal. No respiratory distress. Breath sounds: Normal breath sounds. No wheezing or rales. Abdominal:      General: Bowel sounds are normal. There is no distension. Palpations: Abdomen is soft. There is no mass. Tenderness: There is abdominal tenderness. There is no guarding or rebound. Musculoskeletal:         General: Normal range of motion. Cervical back: Normal range of motion and neck supple. Skin:     General: Skin is warm and dry. Coloration: Skin is not pale. Neurological:      Mental Status: She is alert and oriented to person, place, and time.    Psychiatric:         Behavior: Behavior normal.

## 2023-02-23 NOTE — PROGRESS NOTES
B3   Benign card 3 year recall colonoscopy Impression: Dry eye syndrome of right lacrimal gland: H04.121. Right. Condition: self limited, minor problem. Symptoms: will continue to monitor. Vision: vision not affected. Plan: Discussed diagnosis in detail with patient. Discussed treatment options with patient. Patient instructed to use artificial tears at least 4 times daily or more. Try artificial tear ajit at night for extended comfort. Return to optom for new glasses exam. Diplopia likely not due to ptosis surgery.

## 2023-03-15 ENCOUNTER — TELEPHONE (OUTPATIENT)
Dept: FAMILY MEDICINE CLINIC | Age: 78
End: 2023-03-15

## 2023-03-15 NOTE — TELEPHONE ENCOUNTER
Called patient to try and schedule medicare AWV but patient needs to look at schedule wants a call tomorrow 3/16/2023

## 2023-03-16 ENCOUNTER — TELEPHONE (OUTPATIENT)
Dept: FAMILY MEDICINE CLINIC | Age: 78
End: 2023-03-16

## 2023-03-21 ENCOUNTER — OFFICE VISIT (OUTPATIENT)
Dept: FAMILY MEDICINE CLINIC | Age: 78
End: 2023-03-21
Payer: MEDICARE

## 2023-03-21 VITALS
SYSTOLIC BLOOD PRESSURE: 158 MMHG | HEIGHT: 63 IN | HEART RATE: 113 BPM | TEMPERATURE: 98.8 F | OXYGEN SATURATION: 96 % | BODY MASS INDEX: 26.58 KG/M2 | WEIGHT: 150 LBS | DIASTOLIC BLOOD PRESSURE: 88 MMHG

## 2023-03-21 DIAGNOSIS — Z00.00 MEDICARE ANNUAL WELLNESS VISIT, SUBSEQUENT: Primary | ICD-10-CM

## 2023-03-21 DIAGNOSIS — Z00.00 ROUTINE GENERAL MEDICAL EXAMINATION AT A HEALTH CARE FACILITY: ICD-10-CM

## 2023-03-21 PROCEDURE — G0439 PPPS, SUBSEQ VISIT: HCPCS | Performed by: NURSE PRACTITIONER

## 2023-03-21 PROCEDURE — 3079F DIAST BP 80-89 MM HG: CPT | Performed by: NURSE PRACTITIONER

## 2023-03-21 PROCEDURE — 3077F SYST BP >= 140 MM HG: CPT | Performed by: NURSE PRACTITIONER

## 2023-03-21 PROCEDURE — 1123F ACP DISCUSS/DSCN MKR DOCD: CPT | Performed by: NURSE PRACTITIONER

## 2023-03-21 PROCEDURE — G8484 FLU IMMUNIZE NO ADMIN: HCPCS | Performed by: NURSE PRACTITIONER

## 2023-03-21 ASSESSMENT — LIFESTYLE VARIABLES
HOW OFTEN DO YOU HAVE A DRINK CONTAINING ALCOHOL: MONTHLY OR LESS
HOW MANY STANDARD DRINKS CONTAINING ALCOHOL DO YOU HAVE ON A TYPICAL DAY: 1 OR 2

## 2023-03-21 ASSESSMENT — PATIENT HEALTH QUESTIONNAIRE - PHQ9
SUM OF ALL RESPONSES TO PHQ QUESTIONS 1-9: 0
SUM OF ALL RESPONSES TO PHQ9 QUESTIONS 1 & 2: 0
2. FEELING DOWN, DEPRESSED OR HOPELESS: 0
SUM OF ALL RESPONSES TO PHQ QUESTIONS 1-9: 0
SUM OF ALL RESPONSES TO PHQ QUESTIONS 1-9: 0
1. LITTLE INTEREST OR PLEASURE IN DOING THINGS: 0
SUM OF ALL RESPONSES TO PHQ QUESTIONS 1-9: 0

## 2023-03-21 NOTE — PATIENT INSTRUCTIONS
A Healthy Heart: Care Instructions  Your Care Instructions     Coronary artery disease, also called heart disease, occurs when a substance called plaque builds up in the vessels that supply oxygen-rich blood to your heart muscle. This can narrow the blood vessels and reduce blood flow. A heart attack happens when blood flow is completely blocked. A high-fat diet, smoking, and other factors increase the risk of heart disease. Your doctor has found that you have a chance of having heart disease. You can do lots of things to keep your heart healthy. It may not be easy, but you can change your diet, exercise more, and quit smoking. These steps really work to lower your chance of heart disease. Follow-up care is a key part of your treatment and safety. Be sure to make and go to all appointments, and call your doctor if you are having problems. It's also a good idea to know your test results and keep a list of the medicines you take. How can you care for yourself at home? Diet    Use less salt when you cook and eat. This helps lower your blood pressure. Taste food before salting. Add only a little salt when you think you need it. With time, your taste buds will adjust to less salt.     Eat fewer snack items, fast foods, canned soups, and other high-salt, high-fat, processed foods.     Read food labels and try to avoid saturated and trans fats. They increase your risk of heart disease by raising cholesterol levels.     Limit the amount of solid fat-butter, margarine, and shortening-you eat. Use olive, peanut, or canola oil when you cook. Bake, broil, and steam foods instead of frying them.     Eat a variety of fruit and vegetables every day. Dark green, deep orange, red, or yellow fruits and vegetables are especially good for you. Examples include spinach, carrots, peaches, and berries.     Foods high in fiber can reduce your cholesterol and provide important vitamins and minerals.  High-fiber foods include

## 2023-03-21 NOTE — PROGRESS NOTES
Medicare Annual Wellness Visit    Marixa Soliz is here for [facility] AWV    Assessment & Plan   Medicare annual wellness visit, subsequent  Routine general medical examination at a health care facility    Recommendations for Preventive Services Due: see orders and patient instructions/AVS.  Recommended screening schedule for the next 5-10 years is provided to the patient in written form: see Patient Instructions/AVS.     No follow-ups on file. Subjective   The following acute and/or chronic problems were also addressed today:  She is doing well no complaints here primarily for her annual wellness exam and vaccinations are up-to-date we did an EKG last year  Blood pressure is elevated back in February when it was elevated she was having some back pain so she is gone monitor blood pressure for the next 2 weeks and if it is elevated continues to be then we will adjust her medication  Patient's complete Health Risk Assessment and screening values have been reviewed and are found in Flowsheets. The following problems were reviewed today and where indicated follow up appointments were made and/or referrals ordered. Positive Risk Factor Screenings with Interventions:       Cognitive: Words recalled: 3 Words Recalled   Clock Drawing Test (CDT): (!) Abnormal   Total Score: 3   Total Score Interpretation: Normal Mini-Cog      Interventions:  Normal testing and comprehension                               Objective   Vitals:    03/21/23 1557   BP: (!) 158/88   Pulse: (!) 113   Temp: 98.8 °F (37.1 °C)   TempSrc: Temporal   SpO2: 96%   Weight: 150 lb (68 kg)   Height: 5' 3\" (1.6 m)      Body mass index is 26.57 kg/m².       General Appearance: alert and oriented to person, place and time, well developed and well- nourished, in no acute distress  Skin: warm and dry, no rash or erythema  Head: normocephalic and atraumatic  Eyes: pupils equal, round, and reactive to light, extraocular eye movements intact, conjunctivae

## 2023-05-09 ENCOUNTER — TELEPHONE (OUTPATIENT)
Dept: GASTROENTEROLOGY | Facility: CLINIC | Age: 78
End: 2023-05-09

## 2023-05-09 ENCOUNTER — OFFICE VISIT (OUTPATIENT)
Dept: GASTROENTEROLOGY | Facility: CLINIC | Age: 78
End: 2023-05-09
Payer: MEDICARE

## 2023-05-09 VITALS
HEIGHT: 64 IN | OXYGEN SATURATION: 95 % | WEIGHT: 152 LBS | TEMPERATURE: 96.6 F | BODY MASS INDEX: 25.95 KG/M2 | HEART RATE: 108 BPM | DIASTOLIC BLOOD PRESSURE: 92 MMHG | SYSTOLIC BLOOD PRESSURE: 180 MMHG

## 2023-05-09 DIAGNOSIS — Z85.038 PERSONAL HISTORY OF COLON CANCER: Primary | ICD-10-CM

## 2023-05-09 RX ORDER — GABAPENTIN 100 MG/1
10 CAPSULE ORAL DAILY
COMMUNITY

## 2023-05-09 RX ORDER — TIZANIDINE 4 MG/1
4 TABLET ORAL NIGHTLY PRN
COMMUNITY

## 2023-05-09 NOTE — PROGRESS NOTES
[facility] Gastroenterology    Primary Physician Sera Oseguera APRN    2023    Nasreen Ayala   1945      Chief Complaint   Patient presents with   • Colonoscopy       Subjective     HPI    Nasreen Ayala is a 77 y.o. female who presents as a referral for preventative maintenance. She has no complaints of nausea or vomiting. No change in bowels. No wt loss. No BRBPR. No melena. No abdominal pain.       COLONOSCOPY (10/04/2021 10:58) recall 3 years.   She does have history of malignant colon polyp .         Past Medical History:   Diagnosis Date   • Breast cancer    • Colon polyp    • Diabetes mellitus    • Diverticulosis    • Hemorrhoids    • Hypertension        Past Surgical History:   Procedure Laterality Date   • BREAST BIOPSY     •  SECTION     • COLON SURGERY  2021   • COLONOSCOPY  10/29/2014    polyp destroyed, mild diverticulosis   • COLONOSCOPY  2008    small internal hemorrhoid, 2 polyps, adenomatous, mild diverticulosis   • COLONOSCOPY N/A 2020    Procedure: COLONOSCOPY WITH ANESTHESIA;  Surgeon: Efraín Beck MD;  Location: [facility] ENDOSCOPY;  Service: Gastroenterology;  Laterality: N/A;  pre: hx of polyps  post: polyps. Bx and ink at hepatic flexure polyp site.diverticulosis.   sera oseguera   • COLONOSCOPY N/A 10/04/2021    Procedure: COLONOSCOPY WITH ANESTHESIA;  Surgeon: Efraín Beck MD;  Location: [facility] ENDOSCOPY;  Service: Gastroenterology;  Laterality: N/A;  preop; hx of colon cancer  postop; diverticulosis; polyps   PCP Sera Oseguera    • MASTECTOMY         Outpatient Medications Marked as Taking for the 23 encounter (Office Visit) with Rhonda Hansen APRN   Medication Sig Dispense Refill   • aspirin 81 MG EC tablet Take 1 tablet by mouth Daily.     • furosemide (LASIX) 20 MG tablet Take 1 tablet by mouth 2 (Two) Times a Day.     • gabapentin (NEURONTIN) 100 MG capsule Take 10 mg by mouth Daily.     • glimepiride (AMARYL) 2 MG  tablet Take 1 tablet by mouth.     • lisinopril (PRINIVIL,ZESTRIL) 5 MG tablet Take 1 tablet by mouth Daily.     • metFORMIN (GLUCOPHAGE) 1000 MG tablet TAKE 1 TABLET BY MOUTH TWICE DAILY WITH MEALS     • raloxifene (EVISTA) 60 MG tablet Take 1 tablet by mouth Daily.     • tiZANidine (ZANAFLEX) 4 MG tablet Take 1 tablet by mouth At Night As Needed for Muscle Spasms.         No Known Allergies    Social History     Socioeconomic History   • Marital status:    Tobacco Use   • Smoking status: Never   • Smokeless tobacco: Never   Vaping Use   • Vaping Use: Never used   Substance and Sexual Activity   • Alcohol use: Yes     Comment: occ   • Drug use: Never   • Sexual activity: Defer       Family History   Problem Relation Age of Onset   • Colon cancer Sister         psuedomyxoma   • No Known Problems Mother    • Diabetes Father    • Lung cancer Brother    • Colon polyps Neg Hx        Review of Systems   Constitutional: Negative for chills, fever and unexpected weight change.   Respiratory: Negative for shortness of breath.    Cardiovascular: Negative for chest pain.   Gastrointestinal: Negative for abdominal distention, abdominal pain, anal bleeding, blood in stool, constipation, diarrhea, nausea and vomiting.       Objective     Vitals:    05/09/23 1353   BP: 180/92   Pulse: 108   Temp: 96.6 °F (35.9 °C)   SpO2: 95%         05/09/23  1353   Weight: 68.9 kg (152 lb)     Body mass index is 26.09 kg/m².    Physical Exam  Vitals reviewed.   Constitutional:       General: She is not in acute distress.  Cardiovascular:      Rate and Rhythm: Normal rate and regular rhythm.      Heart sounds: Normal heart sounds.   Pulmonary:      Effort: Pulmonary effort is normal.      Breath sounds: Normal breath sounds.   Abdominal:      General: Bowel sounds are normal. There is no distension.      Palpations: Abdomen is soft.      Tenderness: There is no abdominal tenderness.   Skin:     General: Skin is warm and dry.    Neurological:      Mental Status: She is alert.         Imaging Results (Most Recent)     None          Assessment & Plan     Diagnoses and all orders for this visit:    1. Personal history of colon cancer (Primary)       She is up to date on colonoscopy and is asymptomatic. She thought that she was due for recall colonoscopy.  Last colonoscopy was 2021 with 3 year recall recommended by Dr. Beck.  She would like to wait until then to have colonoscopy. She will call if has any problems.                   All risks, benefits, alternatives, and indications of colonoscopy procedure have been discussed with the patient. Risks to include perforation of the colon requiring possible surgery or colostomy, risk of bleeding from biopsies or removal of colon tissue, possibility of missing a colon polyp or cancer, or adverse drug reaction.  Benefits to include the diagnosis and management of disease of the colon and rectum. Alternatives to include barium enema, radiographic evaluation, lab testing or no intervention. Pt verbalizes understanding and agrees.     There are no Patient Instructions on file for this visit.    DEJUAN Sheldon

## 2023-06-27 ENCOUNTER — HOSPITAL ENCOUNTER (OUTPATIENT)
Dept: WOMENS IMAGING | Age: 78
Discharge: HOME | End: 2023-06-27
Attending: INTERNAL MEDICINE
Payer: MEDICARE

## 2023-06-27 DIAGNOSIS — Z12.31 ENCOUNTER FOR SCREENING MAMMOGRAM FOR MALIGNANT NEOPLASM OF BREAST: ICD-10-CM

## 2023-06-27 PROCEDURE — 77063 BREAST TOMOSYNTHESIS BI: CPT | Performed by: RADIOLOGY

## 2023-06-27 PROCEDURE — 77067 SCR MAMMO BI INCL CAD: CPT | Performed by: RADIOLOGY

## 2023-06-27 PROCEDURE — 77063 BREAST TOMOSYNTHESIS BI: CPT

## 2023-07-05 ENCOUNTER — TELEPHONE (OUTPATIENT)
Dept: FAMILY MEDICINE CLINIC | Age: 78
End: 2023-07-05

## 2023-07-05 DIAGNOSIS — Z01.89 ROUTINE LAB DRAW: ICD-10-CM

## 2023-07-05 DIAGNOSIS — Z01.89 ROUTINE LAB DRAW: Primary | ICD-10-CM

## 2023-07-05 LAB
ALBUMIN SERPL-MCNC: 4.3 G/DL (ref 3.5–5.2)
ALP SERPL-CCNC: 56 U/L (ref 35–104)
ALT SERPL-CCNC: 13 U/L (ref 5–33)
ANION GAP SERPL CALCULATED.3IONS-SCNC: 14 MMOL/L (ref 7–19)
AST SERPL-CCNC: 18 U/L (ref 5–32)
BASOPHILS # BLD: 0.1 K/UL (ref 0–0.2)
BASOPHILS NFR BLD: 0.8 % (ref 0–1)
BILIRUB SERPL-MCNC: 0.3 MG/DL (ref 0.2–1.2)
BUN SERPL-MCNC: 14 MG/DL (ref 8–23)
CALCIUM SERPL-MCNC: 9.2 MG/DL (ref 8.8–10.2)
CHLORIDE SERPL-SCNC: 101 MMOL/L (ref 98–111)
CO2 SERPL-SCNC: 25 MMOL/L (ref 22–29)
CREAT SERPL-MCNC: 0.6 MG/DL (ref 0.5–0.9)
EOSINOPHIL # BLD: 0.2 K/UL (ref 0–0.6)
EOSINOPHIL NFR BLD: 2.5 % (ref 0–5)
ERYTHROCYTE [DISTWIDTH] IN BLOOD BY AUTOMATED COUNT: 12.6 % (ref 11.5–14.5)
GLUCOSE SERPL-MCNC: 109 MG/DL (ref 74–109)
HCT VFR BLD AUTO: 43 % (ref 37–47)
HGB BLD-MCNC: 13.9 G/DL (ref 12–16)
IMM GRANULOCYTES # BLD: 0 K/UL
IRON SERPL-MCNC: 131 UG/DL (ref 37–145)
LYMPHOCYTES # BLD: 2.2 K/UL (ref 1.1–4.5)
LYMPHOCYTES NFR BLD: 30.6 % (ref 20–40)
MCH RBC QN AUTO: 33.2 PG (ref 27–31)
MCHC RBC AUTO-ENTMCNC: 32.3 G/DL (ref 33–37)
MCV RBC AUTO: 102.6 FL (ref 81–99)
MONOCYTES # BLD: 0.5 K/UL (ref 0–0.9)
MONOCYTES NFR BLD: 6.6 % (ref 0–10)
NEUTROPHILS # BLD: 4.3 K/UL (ref 1.5–7.5)
NEUTS SEG NFR BLD: 59.2 % (ref 50–65)
PLATELET # BLD AUTO: 226 K/UL (ref 130–400)
PMV BLD AUTO: 9.6 FL (ref 9.4–12.3)
POTASSIUM SERPL-SCNC: 4.6 MMOL/L (ref 3.5–5)
PROT SERPL-MCNC: 7.7 G/DL (ref 6.6–8.7)
RBC # BLD AUTO: 4.19 M/UL (ref 4.2–5.4)
SODIUM SERPL-SCNC: 140 MMOL/L (ref 136–145)
TIBC SERPL-MCNC: 337 UG/DL (ref 250–400)
TSH SERPL DL<=0.005 MIU/L-ACNC: 2.12 UIU/ML (ref 0.27–4.2)
WBC # BLD AUTO: 7.3 K/UL (ref 4.8–10.8)

## 2023-07-05 NOTE — TELEPHONE ENCOUNTER
Pt called to get medications refilled but said she needed labs before refill. She is going to get labs done today and is coming in Monday to follow up and get medications refilled.

## 2023-07-06 DIAGNOSIS — Z01.89 ROUTINE LAB DRAW: ICD-10-CM

## 2023-07-06 LAB — HBA1C MFR BLD: 7.2 % (ref 4–6)

## 2023-07-10 ENCOUNTER — OFFICE VISIT (OUTPATIENT)
Dept: FAMILY MEDICINE CLINIC | Age: 78
End: 2023-07-10
Payer: MEDICARE

## 2023-07-10 VITALS
TEMPERATURE: 96.8 F | OXYGEN SATURATION: 96 % | BODY MASS INDEX: 26.93 KG/M2 | DIASTOLIC BLOOD PRESSURE: 100 MMHG | HEART RATE: 114 BPM | RESPIRATION RATE: 14 BRPM | HEIGHT: 63 IN | SYSTOLIC BLOOD PRESSURE: 158 MMHG | WEIGHT: 152 LBS

## 2023-07-10 DIAGNOSIS — E11.9 TYPE 2 DIABETES MELLITUS WITHOUT COMPLICATION, WITHOUT LONG-TERM CURRENT USE OF INSULIN (HCC): ICD-10-CM

## 2023-07-10 DIAGNOSIS — I10 ESSENTIAL HYPERTENSION: ICD-10-CM

## 2023-07-10 DIAGNOSIS — C50.911 MALIGNANT NEOPLASM OF RIGHT FEMALE BREAST, UNSPECIFIED ESTROGEN RECEPTOR STATUS, UNSPECIFIED SITE OF BREAST (HCC): ICD-10-CM

## 2023-07-10 DIAGNOSIS — R00.0 TACHYCARDIA: ICD-10-CM

## 2023-07-10 DIAGNOSIS — M54.30 SCIATIC NERVE PAIN, UNSPECIFIED LATERALITY: ICD-10-CM

## 2023-07-10 DIAGNOSIS — I35.0 MILD AORTIC STENOSIS: Primary | ICD-10-CM

## 2023-07-10 DIAGNOSIS — L98.9 SKIN LESION OF FACE: ICD-10-CM

## 2023-07-10 PROCEDURE — 3051F HG A1C>EQUAL 7.0%<8.0%: CPT | Performed by: NURSE PRACTITIONER

## 2023-07-10 PROCEDURE — G8417 CALC BMI ABV UP PARAM F/U: HCPCS | Performed by: NURSE PRACTITIONER

## 2023-07-10 PROCEDURE — 3077F SYST BP >= 140 MM HG: CPT | Performed by: NURSE PRACTITIONER

## 2023-07-10 PROCEDURE — 1036F TOBACCO NON-USER: CPT | Performed by: NURSE PRACTITIONER

## 2023-07-10 PROCEDURE — 1123F ACP DISCUSS/DSCN MKR DOCD: CPT | Performed by: NURSE PRACTITIONER

## 2023-07-10 PROCEDURE — 3080F DIAST BP >= 90 MM HG: CPT | Performed by: NURSE PRACTITIONER

## 2023-07-10 PROCEDURE — G8427 DOCREV CUR MEDS BY ELIG CLIN: HCPCS | Performed by: NURSE PRACTITIONER

## 2023-07-10 PROCEDURE — G8399 PT W/DXA RESULTS DOCUMENT: HCPCS | Performed by: NURSE PRACTITIONER

## 2023-07-10 PROCEDURE — 99214 OFFICE O/P EST MOD 30 MIN: CPT | Performed by: NURSE PRACTITIONER

## 2023-07-10 PROCEDURE — 1090F PRES/ABSN URINE INCON ASSESS: CPT | Performed by: NURSE PRACTITIONER

## 2023-07-10 RX ORDER — TIZANIDINE 4 MG/1
4 TABLET ORAL NIGHTLY
Qty: 90 TABLET | Refills: 1 | Status: SHIPPED | OUTPATIENT
Start: 2023-07-10

## 2023-07-10 RX ORDER — RALOXIFENE HYDROCHLORIDE 60 MG/1
TABLET, FILM COATED ORAL
Qty: 90 TABLET | Refills: 1 | Status: SHIPPED | OUTPATIENT
Start: 2023-07-10

## 2023-07-10 RX ORDER — LISINOPRIL 10 MG/1
10 TABLET ORAL 2 TIMES DAILY
Qty: 180 TABLET | Refills: 1 | Status: SHIPPED | OUTPATIENT
Start: 2023-07-10

## 2023-07-10 RX ORDER — FUROSEMIDE 20 MG/1
20 TABLET ORAL DAILY
Qty: 90 TABLET | Refills: 1 | Status: SHIPPED | OUTPATIENT
Start: 2023-07-10

## 2023-07-10 RX ORDER — GLIMEPIRIDE 2 MG/1
TABLET ORAL
Qty: 90 TABLET | Refills: 1 | Status: SHIPPED | OUTPATIENT
Start: 2023-07-10

## 2023-07-10 RX ORDER — GABAPENTIN 100 MG/1
100 CAPSULE ORAL 2 TIMES DAILY
Qty: 180 CAPSULE | Refills: 1 | Status: SHIPPED | OUTPATIENT
Start: 2023-07-10 | End: 2024-01-06

## 2023-07-10 RX ORDER — METOPROLOL SUCCINATE 25 MG/1
25 TABLET, EXTENDED RELEASE ORAL NIGHTLY
Qty: 90 TABLET | Refills: 1 | Status: SHIPPED | OUTPATIENT
Start: 2023-07-10 | End: 2023-07-10

## 2023-07-10 ASSESSMENT — ENCOUNTER SYMPTOMS
COUGH: 0
ABDOMINAL PAIN: 1
DIARRHEA: 0
SHORTNESS OF BREATH: 0
CONSTIPATION: 0
BACK PAIN: 1

## 2023-07-10 NOTE — PROGRESS NOTES
SUBJECTIVE:  Here today for med refills and also discussed blood pressure  Patient ID: Guillermo Lennox is a 69 y.o. female. HPI:   Medication Refill  Associated symptoms include abdominal pain, arthralgias and fatigue. Pertinent negatives include no chest pain, coughing or headaches. Dm Good levels have actually improved her A1c has now come down to 7.2 she denies any problems with medication  HTn elevated today. Has been running up in the 160. Blood pressure is still not controlled does not show that there is any kidney function problem one of the things that we have noticed is her aortic her murmur is very loud and prominent right now last time it was checked was in 2018 by Dr. Sherrie Cool so I am going to repeat the 2D echo and I have adjusted her lisinopril    She takes Neurontin for joint pain and is doing well with that she is takes Lasix on a regular basis she is no longer taking her metoprolol she said when she went to [facility] they took her off of that medication    Skin lesion is on the inner aspect between the tear duct and the bridge of the nose it is flesh-colored would appears to be a blood vessel  that is running through it is circular and round about 3 to 4 millimeters  Past Medical History:   Diagnosis Date    Breast cancer ([address] )     RIGHT SIDE    Cancer [facility])     breast right breast    Diabetes type 2, controlled ([address])     Encounter for central line care 06/21/2021    H/O screening mammography 12/06/2018    LEFT BR 2 @[facility]     History of therapeutic radiation 2000    Hx antineoplastic chemo 2000    Hypertension     Osteoarthritis     Osteopenia     on Evista      Prior to Visit Medications    Medication Sig Taking?  Authorizing Provider   tiZANidine (ZANAFLEX) 4 MG tablet Take 1 tablet by mouth at bedtime Yes Amada Gelineau, APRN   raloxifene (EVISTA) 60 MG tablet Take 1 tablet by mouth once daily Yes Amada Gelineau, APRN   glimepiride (AMARYL) 2 MG tablet TAKE 1 TABLET BY MOUTH ONCE

## 2023-07-21 NOTE — PROGRESS NOTES
was followed by adjuvant chemotherapy with FAC x 3 and CMF x 6.    2D ECHO on 12/6/2018 had an EF of 55-60%. Unilateral left mammogram at [address] on 6/27/2023 was BIRADS-2    CA 15-3 and CEA will be drawn today with follow-up in 1 year    I will see her back in follow-up in 1 year      #3  Breast cancer screening      Unilateral left mammogram at [address] on 6/27/2023 was BIRADS-2      #4  GI cancer screening    Colonoscopy 10/1/2020 per Dr Fermin Shook at [address]  Impression:             30 mm hepatic flexure polyp located in between two folds which was proximal to a previously placed tattoo marking. Incomplete removal with piecemeal EMR as described above, with a portion of the proximal margin of the polyp that was not amenable to endoscopic resection. 2.   One 2 mm polyp in the rectum, removed with a jumbo cold forceps. Resected and retrieved. Pathology:  1. Colon, hepatic flexure polyp, endoscopic mucosal resection - Fragments of tubular adenoma, see note    Note: Margins of excision cannot be commented upon due to fragmented nature of the biopsy  2.  . Rectum, polyp, polypectomy - Hyperplastic polyp   Recommendation:  Refer to colorectal surgery for consideration of surgical management of incompletely resected large hepatic flexure polyp. 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 Ivan Jose M.D.,       Colonoscopy on 10/4/2021 by Dr. Rosanna Edmondson on 10/4/2021 with a 3 year recall  Final Diagnosis    Anastomosis site, colon, biopsies:  Hyperplastic colonic mucosa. #4  Bone health    DEXA scan at [facility] on 11/26/2018 showed osteopenia  She is on calcium and vitamin D    #5  GYN cancer screening    Roque Ponce, her PCP monitors her from the GYN standpoint.       #6   Chronic liver mass abnormality dating back to at least 1993    An ultrasound of the liver on 7/6/2017 documented a large

## 2023-07-24 ENCOUNTER — OFFICE VISIT (OUTPATIENT)
Dept: HEMATOLOGY | Age: 78
End: 2023-07-24
Payer: MEDICARE

## 2023-07-24 ENCOUNTER — HOSPITAL ENCOUNTER (OUTPATIENT)
Dept: INFUSION THERAPY | Age: 78
Discharge: HOME | End: 2023-07-24
Payer: MEDICARE

## 2023-07-24 VITALS
DIASTOLIC BLOOD PRESSURE: 78 MMHG | HEART RATE: 106 BPM | BODY MASS INDEX: 26.93 KG/M2 | SYSTOLIC BLOOD PRESSURE: 144 MMHG | HEIGHT: 63 IN | WEIGHT: 152 LBS | OXYGEN SATURATION: 94 %

## 2023-07-24 DIAGNOSIS — C50.911 MALIGNANT NEOPLASM OF RIGHT FEMALE BREAST, UNSPECIFIED ESTROGEN RECEPTOR STATUS, UNSPECIFIED SITE OF BREAST (HCC): ICD-10-CM

## 2023-07-24 DIAGNOSIS — R16.0 LIVER MASS: ICD-10-CM

## 2023-07-24 DIAGNOSIS — Z45.2 ENCOUNTER FOR CENTRAL LINE CARE: ICD-10-CM

## 2023-07-24 DIAGNOSIS — Z12.31 ENCOUNTER FOR SCREENING MAMMOGRAM FOR MALIGNANT NEOPLASM OF BREAST: ICD-10-CM

## 2023-07-24 DIAGNOSIS — C50.911 MALIGNANT NEOPLASM OF RIGHT FEMALE BREAST, UNSPECIFIED ESTROGEN RECEPTOR STATUS, UNSPECIFIED SITE OF BREAST (HCC): Primary | ICD-10-CM

## 2023-07-24 DIAGNOSIS — Z00.00 HEALTHCARE MAINTENANCE: ICD-10-CM

## 2023-07-24 LAB
ALBUMIN SERPL-MCNC: 4.2 G/DL (ref 3.5–5.2)
ALP SERPL-CCNC: 76 U/L (ref 35–104)
ALT SERPL-CCNC: 22 U/L (ref 9–52)
ANION GAP SERPL CALCULATED.3IONS-SCNC: 13 MMOL/L (ref 7–19)
AST SERPL-CCNC: 25 U/L (ref 14–36)
BILIRUB SERPL-MCNC: <0.2 MG/DL (ref 0.2–1.3)
BUN SERPL-MCNC: 12 MG/DL (ref 7–17)
CA 15-3: 25 U/ML (ref 0–25)
CALCIUM SERPL-MCNC: 9.5 MG/DL (ref 8.4–10.2)
CEA SERPL-MCNC: 1.6 NG/ML (ref 0–4.7)
CHLORIDE SERPL-SCNC: 102 MMOL/L (ref 98–111)
CO2 SERPL-SCNC: 27 MMOL/L (ref 22–29)
CREAT SERPL-MCNC: 0.6 MG/DL (ref 0.5–1)
ERYTHROCYTE [DISTWIDTH] IN BLOOD BY AUTOMATED COUNT: 12.4 % (ref 11.7–14.4)
GLOBULIN: 3.5 G/DL
GLUCOSE SERPL-MCNC: 177 MG/DL (ref 74–106)
HCT VFR BLD AUTO: 41.2 % (ref 34.1–44.9)
HGB BLD-MCNC: 13.3 G/DL (ref 11.2–15.7)
LYMPHOCYTES # BLD: 1.62 K/UL (ref 1.18–3.74)
LYMPHOCYTES NFR BLD: 27.7 % (ref 19.3–53.1)
MCH RBC QN AUTO: 32.8 PG (ref 25.6–32.2)
MCHC RBC AUTO-ENTMCNC: 32.3 G/DL (ref 32.3–35.5)
MCV RBC AUTO: 101.5 FL (ref 79.4–94.8)
MONOCYTES # BLD: 0.45 K/UL (ref 0.24–0.82)
MONOCYTES NFR BLD: 7.7 % (ref 4.7–12.5)
NEUTROPHILS # BLD: 3.51 K/UL (ref 1.56–6.13)
NEUTS SEG NFR BLD: 60 % (ref 34–71.1)
PLATELET # BLD AUTO: 207 K/UL (ref 182–369)
PMV BLD AUTO: 8.9 FL (ref 7.4–10.4)
POTASSIUM SERPL-SCNC: 4.1 MMOL/L (ref 3.5–5.1)
PROT SERPL-MCNC: 7.7 G/DL (ref 6.3–8.2)
RBC # BLD AUTO: 4.06 M/UL (ref 3.93–5.22)
SODIUM SERPL-SCNC: 142 MMOL/L (ref 137–145)
WBC # BLD AUTO: 5.85 K/UL (ref 3.98–10.04)

## 2023-07-24 PROCEDURE — 3078F DIAST BP <80 MM HG: CPT | Performed by: INTERNAL MEDICINE

## 2023-07-24 PROCEDURE — 1123F ACP DISCUSS/DSCN MKR DOCD: CPT | Performed by: INTERNAL MEDICINE

## 2023-07-24 PROCEDURE — G8399 PT W/DXA RESULTS DOCUMENT: HCPCS | Performed by: INTERNAL MEDICINE

## 2023-07-24 PROCEDURE — 36415 COLL VENOUS BLD VENIPUNCTURE: CPT

## 2023-07-24 PROCEDURE — 6360000002 HC RX W HCPCS: Performed by: INTERNAL MEDICINE

## 2023-07-24 PROCEDURE — 2580000003 HC RX 258: Performed by: INTERNAL MEDICINE

## 2023-07-24 PROCEDURE — G8427 DOCREV CUR MEDS BY ELIG CLIN: HCPCS | Performed by: INTERNAL MEDICINE

## 2023-07-24 PROCEDURE — 1090F PRES/ABSN URINE INCON ASSESS: CPT | Performed by: INTERNAL MEDICINE

## 2023-07-24 PROCEDURE — G8417 CALC BMI ABV UP PARAM F/U: HCPCS | Performed by: INTERNAL MEDICINE

## 2023-07-24 PROCEDURE — 99214 OFFICE O/P EST MOD 30 MIN: CPT | Performed by: INTERNAL MEDICINE

## 2023-07-24 PROCEDURE — 96523 IRRIG DRUG DELIVERY DEVICE: CPT

## 2023-07-24 PROCEDURE — 80053 COMPREHEN METABOLIC PANEL: CPT

## 2023-07-24 PROCEDURE — 85025 COMPLETE CBC W/AUTO DIFF WBC: CPT

## 2023-07-24 PROCEDURE — 3077F SYST BP >= 140 MM HG: CPT | Performed by: INTERNAL MEDICINE

## 2023-07-24 PROCEDURE — 99212 OFFICE O/P EST SF 10 MIN: CPT

## 2023-07-24 PROCEDURE — 1036F TOBACCO NON-USER: CPT | Performed by: INTERNAL MEDICINE

## 2023-07-24 RX ORDER — SODIUM CHLORIDE 0.9 % (FLUSH) 0.9 %
5-40 SYRINGE (ML) INJECTION PRN
Status: DISCONTINUED | OUTPATIENT
Start: 2023-07-24 | End: 2023-07-25 | Stop reason: HOSPADM

## 2023-07-24 RX ORDER — SODIUM CHLORIDE 0.9 % (FLUSH) 0.9 %
5-40 SYRINGE (ML) INJECTION PRN
OUTPATIENT
Start: 2023-07-24

## 2023-07-24 RX ORDER — HEPARIN 100 UNIT/ML
500 SYRINGE INTRAVENOUS PRN
OUTPATIENT
Start: 2023-07-24

## 2023-07-24 RX ORDER — HEPARIN 100 UNIT/ML
500 SYRINGE INTRAVENOUS PRN
Status: DISCONTINUED | OUTPATIENT
Start: 2023-07-24 | End: 2023-07-25 | Stop reason: HOSPADM

## 2023-07-24 RX ADMIN — SODIUM CHLORIDE, PRESERVATIVE FREE 10 ML: 5 INJECTION INTRAVENOUS at 12:27

## 2023-07-24 RX ADMIN — HEPARIN 500 UNITS: 100 SYRINGE at 12:27

## 2023-08-02 ENCOUNTER — HOSPITAL ENCOUNTER (OUTPATIENT)
Dept: NON INVASIVE DIAGNOSTICS | Age: 78
Discharge: HOME | End: 2023-08-02
Payer: MEDICARE

## 2023-08-02 DIAGNOSIS — I35.0 MILD AORTIC STENOSIS: ICD-10-CM

## 2023-08-02 LAB
LV EF: 63 %
LVEF MODALITY: NORMAL

## 2023-08-02 PROCEDURE — 6360000002 HC RX W HCPCS: Performed by: INTERNAL MEDICINE

## 2023-08-02 PROCEDURE — C8929 TTE W OR WO FOL WCON,DOPPLER: HCPCS

## 2023-08-02 PROCEDURE — 6360000004 HC RX CONTRAST MEDICATION: Performed by: NURSE PRACTITIONER

## 2023-08-02 RX ORDER — HEPARIN 100 UNIT/ML
300 SYRINGE INTRAVENOUS PRN
Status: DISCONTINUED | OUTPATIENT
Start: 2023-08-02 | End: 2023-08-04 | Stop reason: HOSPADM

## 2023-08-02 RX ADMIN — HEPARIN 300 UNITS: 100 SYRINGE at 15:00

## 2023-08-02 RX ADMIN — PERFLUTREN 1.5 ML: 6.52 INJECTION, SUSPENSION INTRAVENOUS at 13:45

## 2023-08-03 ENCOUNTER — TELEPHONE (OUTPATIENT)
Dept: FAMILY MEDICINE CLINIC | Age: 78
End: 2023-08-03

## 2023-08-03 DIAGNOSIS — K76.89 LIVER CYST: Primary | ICD-10-CM

## 2023-08-03 NOTE — TELEPHONE ENCOUNTER
Patient has been called and notified of results. Liver ultrasound has been placed. Pt voiced understanding.

## 2023-08-03 NOTE — TELEPHONE ENCOUNTER
----- Message from TREVON Ann sent at 8/3/2023  8:43 AM CDT -----  2D echo shows just mild left ventricular enlargement may be a little bit of diastolic dysfunction but not bad mild mitral and aortic stenosis has not moved over to moderate or severe the aortic root is in normal range and going up out of the heart area is normal you do have a liver cyst that measures 7.73 x 8.30 we can evaluate this a little further back ultrasound liver

## 2023-08-07 ENCOUNTER — OFFICE VISIT (OUTPATIENT)
Dept: FAMILY MEDICINE CLINIC | Age: 78
End: 2023-08-07
Payer: MEDICARE

## 2023-08-07 VITALS
BODY MASS INDEX: 26.49 KG/M2 | SYSTOLIC BLOOD PRESSURE: 130 MMHG | WEIGHT: 149.5 LBS | HEART RATE: 94 BPM | OXYGEN SATURATION: 95 % | DIASTOLIC BLOOD PRESSURE: 80 MMHG | TEMPERATURE: 97.4 F | HEIGHT: 63 IN

## 2023-08-07 DIAGNOSIS — I10 ESSENTIAL HYPERTENSION: Primary | ICD-10-CM

## 2023-08-07 DIAGNOSIS — M54.30 SCIATIC NERVE PAIN, UNSPECIFIED LATERALITY: ICD-10-CM

## 2023-08-07 PROCEDURE — 99213 OFFICE O/P EST LOW 20 MIN: CPT | Performed by: NURSE PRACTITIONER

## 2023-08-07 PROCEDURE — G8417 CALC BMI ABV UP PARAM F/U: HCPCS | Performed by: NURSE PRACTITIONER

## 2023-08-07 PROCEDURE — 3079F DIAST BP 80-89 MM HG: CPT | Performed by: NURSE PRACTITIONER

## 2023-08-07 PROCEDURE — 1123F ACP DISCUSS/DSCN MKR DOCD: CPT | Performed by: NURSE PRACTITIONER

## 2023-08-07 PROCEDURE — 1090F PRES/ABSN URINE INCON ASSESS: CPT | Performed by: NURSE PRACTITIONER

## 2023-08-07 PROCEDURE — 1036F TOBACCO NON-USER: CPT | Performed by: NURSE PRACTITIONER

## 2023-08-07 PROCEDURE — 3075F SYST BP GE 130 - 139MM HG: CPT | Performed by: NURSE PRACTITIONER

## 2023-08-07 PROCEDURE — G8399 PT W/DXA RESULTS DOCUMENT: HCPCS | Performed by: NURSE PRACTITIONER

## 2023-08-07 PROCEDURE — G8427 DOCREV CUR MEDS BY ELIG CLIN: HCPCS | Performed by: NURSE PRACTITIONER

## 2023-08-07 ASSESSMENT — ENCOUNTER SYMPTOMS: BACK PAIN: 1

## 2023-08-07 NOTE — PROGRESS NOTES
SUBJECTIVE:  Follow up on blood pressure   Patient ID: New Castillo is a 69 y.o. female. HPI:   HPI   We have increased her amlodipine from 5 to 10 mg and her blood pressure is doing much better 130 over 80 mm also related to her back pain she had done something recently to it and was taking the Neurontin once a day and's actually ordered twice a day so now that she is taking it twice a day she feels a little bit better she wants to see if she can get herself straightened out first and if not then she wants to return back to physical therapy    We also did discuss the 2D echo results which were acceptable she does have a heart murmur is still present but it was not anything significant going on  Past Medical History:   Diagnosis Date    Breast cancer ([address])     RIGHT SIDE    Cancer [facility])     breast right breast    Diabetes type 2, controlled ([address])     Encounter for central line care 06/21/2021    H/O screening mammography 12/06/2018    LEFT BR 2 @[facility]     History of therapeutic radiation 2000    Hx antineoplastic chemo 2000    Hypertension     Osteoarthritis     Osteopenia     on Evista      Prior to Visit Medications    Medication Sig Taking? Authorizing Provider   tiZANidine (ZANAFLEX) 4 MG tablet Take 1 tablet by mouth at bedtime Yes TREVON Marcum   raloxifene (EVISTA) 60 MG tablet Take 1 tablet by mouth once daily Yes TREVON Marcum   glimepiride (AMARYL) 2 MG tablet TAKE 1 TABLET BY MOUTH ONCE DAILY IN THE MORNING BEFORE BREAKFAST Yes TREVON Marcum   furosemide (LASIX) 20 MG tablet Take 1 tablet by mouth daily Yes TREVON Marcum   lisinopril (PRINIVIL;ZESTRIL) 10 MG tablet Take 1 tablet by mouth 2 times daily Yes TREVON Marcum   gabapentin (NEURONTIN) 100 MG capsule Take 1 capsule by mouth 2 times daily for 180 days.  Intended supply: 90 days Max Daily Amount: 200 mg Yes TREVON Marcum   metFORMIN (GLUCOPHAGE) 1000 MG tablet TAKE 1 TABLET BY MOUTH TWICE DAILY

## 2023-08-15 ENCOUNTER — TELEPHONE (OUTPATIENT)
Dept: FAMILY MEDICINE CLINIC | Age: 78
End: 2023-08-15

## 2023-08-15 DIAGNOSIS — Z01.818 PRE-OP TESTING: Primary | ICD-10-CM

## 2023-08-18 ENCOUNTER — HOSPITAL ENCOUNTER (OUTPATIENT)
Dept: ULTRASOUND IMAGING | Age: 78
Discharge: HOME | End: 2023-08-18
Payer: MEDICARE

## 2023-08-18 DIAGNOSIS — K76.89 LIVER CYST: ICD-10-CM

## 2023-08-18 PROCEDURE — 76705 ECHO EXAM OF ABDOMEN: CPT

## 2023-08-22 ENCOUNTER — TELEPHONE (OUTPATIENT)
Dept: PRIMARY CARE CLINIC | Age: 78
End: 2023-08-22

## 2023-08-22 NOTE — TELEPHONE ENCOUNTER
----- Message from TREVON Mckenna sent at 8/21/2023 10:47 AM CDT -----  Ultrasound of the liver shows a benign cyst of the liver it does look like you also have a fatty liver but you also have gallbladder issues and no stones are identified but you do have some irritation there correlate with Patient

## 2023-09-12 ENCOUNTER — NURSE ONLY (OUTPATIENT)
Dept: FAMILY MEDICINE CLINIC | Age: 78
End: 2023-09-12

## 2023-09-12 DIAGNOSIS — Z01.818 PRE-OP TESTING: ICD-10-CM

## 2023-09-12 DIAGNOSIS — Z01.818 PRE-OP TESTING: Primary | ICD-10-CM

## 2023-09-12 LAB
ERYTHROCYTE [DISTWIDTH] IN BLOOD BY AUTOMATED COUNT: 12 % (ref 11.5–14.5)
HCT VFR BLD AUTO: 46.5 % (ref 37–47)
HGB BLD-MCNC: 14.3 G/DL (ref 12–16)
MCH RBC QN AUTO: 33.2 PG (ref 27–31)
MCHC RBC AUTO-ENTMCNC: 30.8 G/DL (ref 33–37)
MCV RBC AUTO: 107.9 FL (ref 81–99)
PLATELET # BLD AUTO: 217 K/UL (ref 130–400)
PMV BLD AUTO: 9.2 FL (ref 9.4–12.3)
RBC # BLD AUTO: 4.31 M/UL (ref 4.2–5.4)
WBC # BLD AUTO: 6.4 K/UL (ref 4.8–10.8)

## 2023-09-12 PROCEDURE — 93000 ELECTROCARDIOGRAM COMPLETE: CPT | Performed by: NURSE PRACTITIONER

## 2023-09-14 ENCOUNTER — TELEPHONE (OUTPATIENT)
Dept: FAMILY MEDICINE CLINIC | Age: 78
End: 2023-09-14

## 2023-09-14 DIAGNOSIS — Z01.89 ROUTINE LAB DRAW: ICD-10-CM

## 2023-09-14 LAB
ALBUMIN SERPL-MCNC: 4.3 G/DL (ref 3.5–5.2)
ALP SERPL-CCNC: 66 U/L (ref 35–104)
ALT SERPL-CCNC: 17 U/L (ref 5–33)
ANION GAP SERPL CALCULATED.3IONS-SCNC: 12 MMOL/L (ref 7–19)
AST SERPL-CCNC: 18 U/L (ref 5–32)
BILIRUB SERPL-MCNC: 0.3 MG/DL (ref 0.2–1.2)
BUN SERPL-MCNC: 16 MG/DL (ref 8–23)
CALCIUM SERPL-MCNC: 9.4 MG/DL (ref 8.8–10.2)
CHLORIDE SERPL-SCNC: 99 MMOL/L (ref 98–111)
CO2 SERPL-SCNC: 28 MMOL/L (ref 22–29)
CREAT SERPL-MCNC: 0.7 MG/DL (ref 0.5–0.9)
GLUCOSE SERPL-MCNC: 137 MG/DL (ref 74–109)
POTASSIUM SERPL-SCNC: 4 MMOL/L (ref 3.5–5)
PROT SERPL-MCNC: 7.6 G/DL (ref 6.6–8.7)
SODIUM SERPL-SCNC: 139 MMOL/L (ref 136–145)

## 2023-09-14 NOTE — TELEPHONE ENCOUNTER
----- Message from TREVON Carbajal sent at 9/14/2023 10:20 AM CDT -----  CBC really increased from a month ago when her MCV went from 101.5-107.9 I like for her to talk to her hematologist about this probably has to do with her fatty liver consideration would be to see GI over this

## 2023-09-15 ENCOUNTER — TELEPHONE (OUTPATIENT)
Dept: FAMILY MEDICINE CLINIC | Age: 78
End: 2023-09-15

## 2023-09-15 NOTE — TELEPHONE ENCOUNTER
----- Message from TREVON Farias sent at 9/14/2023  1:22 PM CDT -----  CMP shows normal kidney function and liver function test glucose was 137 that was a fasting level but much better than a month ago

## 2023-09-15 NOTE — SIGNIFICANT NOTE
Home Rx;  Lisinopril-instructed Not to take x 24 hours before DOS-9/19/23.  ASA-instructed to not take, starting 9/16/23, for DOS-9/19/23.

## 2023-09-19 ENCOUNTER — ANESTHESIA (OUTPATIENT)
Dept: PERIOP | Facility: HOSPITAL | Age: 78
End: 2023-09-19
Payer: MEDICARE

## 2023-09-19 ENCOUNTER — ANESTHESIA EVENT (OUTPATIENT)
Dept: PERIOP | Facility: HOSPITAL | Age: 78
End: 2023-09-19
Payer: MEDICARE

## 2023-09-19 ENCOUNTER — HOSPITAL ENCOUNTER (OUTPATIENT)
Facility: HOSPITAL | Age: 78
Setting detail: HOSPITAL OUTPATIENT SURGERY
Discharge: HOME | End: 2023-09-19
Attending: SURGERY | Admitting: SURGERY
Payer: MEDICARE

## 2023-09-19 VITALS
DIASTOLIC BLOOD PRESSURE: 89 MMHG | RESPIRATION RATE: 16 BRPM | OXYGEN SATURATION: 93 % | HEIGHT: 64 IN | SYSTOLIC BLOOD PRESSURE: 184 MMHG | HEART RATE: 105 BPM | TEMPERATURE: 98.2 F | WEIGHT: 152.12 LBS | BODY MASS INDEX: 25.97 KG/M2

## 2023-09-19 DIAGNOSIS — H02.826 CYST OF LEFT EYELID: ICD-10-CM

## 2023-09-19 LAB
GLUCOSE BLDC GLUCOMTR-MCNC: 104 MG/DL (ref 70–130)
GLUCOSE BLDC GLUCOMTR-MCNC: 119 MG/DL (ref 70–130)

## 2023-09-19 PROCEDURE — 25010000002 CEFAZOLIN 1-4 GM/50ML-% SOLUTION: Performed by: SURGERY

## 2023-09-19 PROCEDURE — 82948 REAGENT STRIP/BLOOD GLUCOSE: CPT

## 2023-09-19 PROCEDURE — 25010000002 PROPOFOL 1000 MG/100ML EMULSION: Performed by: NURSE ANESTHETIST, CERTIFIED REGISTERED

## 2023-09-19 PROCEDURE — 25010000002 FENTANYL CITRATE (PF) 100 MCG/2ML SOLUTION: Performed by: NURSE ANESTHETIST, CERTIFIED REGISTERED

## 2023-09-19 PROCEDURE — 88305 TISSUE EXAM BY PATHOLOGIST: CPT | Performed by: SURGERY

## 2023-09-19 RX ORDER — HYDROCODONE BITARTRATE AND ACETAMINOPHEN 5; 325 MG/1; MG/1
1 TABLET ORAL ONCE AS NEEDED
Status: DISCONTINUED | OUTPATIENT
Start: 2023-09-19 | End: 2023-09-19 | Stop reason: HOSPADM

## 2023-09-19 RX ORDER — FLUMAZENIL 0.1 MG/ML
0.2 INJECTION INTRAVENOUS AS NEEDED
Status: DISCONTINUED | OUTPATIENT
Start: 2023-09-19 | End: 2023-09-19 | Stop reason: HOSPADM

## 2023-09-19 RX ORDER — SODIUM CHLORIDE, SODIUM LACTATE, POTASSIUM CHLORIDE, CALCIUM CHLORIDE 600; 310; 30; 20 MG/100ML; MG/100ML; MG/100ML; MG/100ML
100 INJECTION, SOLUTION INTRAVENOUS CONTINUOUS
Status: DISCONTINUED | OUTPATIENT
Start: 2023-09-19 | End: 2023-09-19 | Stop reason: HOSPADM

## 2023-09-19 RX ORDER — LIDOCAINE HYDROCHLORIDE AND EPINEPHRINE 10; 10 MG/ML; UG/ML
INJECTION, SOLUTION INFILTRATION; PERINEURAL AS NEEDED
Status: DISCONTINUED | OUTPATIENT
Start: 2023-09-19 | End: 2023-09-19 | Stop reason: HOSPADM

## 2023-09-19 RX ORDER — HYDROCODONE BITARTRATE AND ACETAMINOPHEN 10; 325 MG/1; MG/1
1 TABLET ORAL EVERY 4 HOURS PRN
Status: DISCONTINUED | OUTPATIENT
Start: 2023-09-19 | End: 2023-09-19 | Stop reason: HOSPADM

## 2023-09-19 RX ORDER — LABETALOL HYDROCHLORIDE 5 MG/ML
5 INJECTION, SOLUTION INTRAVENOUS
Status: DISCONTINUED | OUTPATIENT
Start: 2023-09-19 | End: 2023-09-19 | Stop reason: HOSPADM

## 2023-09-19 RX ORDER — FENTANYL CITRATE 50 UG/ML
INJECTION, SOLUTION INTRAMUSCULAR; INTRAVENOUS AS NEEDED
Status: DISCONTINUED | OUTPATIENT
Start: 2023-09-19 | End: 2023-09-19 | Stop reason: SURG

## 2023-09-19 RX ORDER — DROPERIDOL 2.5 MG/ML
0.62 INJECTION, SOLUTION INTRAMUSCULAR; INTRAVENOUS ONCE AS NEEDED
Status: DISCONTINUED | OUTPATIENT
Start: 2023-09-19 | End: 2023-09-19 | Stop reason: HOSPADM

## 2023-09-19 RX ORDER — SODIUM CHLORIDE 0.9 % (FLUSH) 0.9 %
3 SYRINGE (ML) INJECTION AS NEEDED
Status: DISCONTINUED | OUTPATIENT
Start: 2023-09-19 | End: 2023-09-19 | Stop reason: HOSPADM

## 2023-09-19 RX ORDER — NALOXONE HCL 0.4 MG/ML
0.4 VIAL (ML) INJECTION AS NEEDED
Status: DISCONTINUED | OUTPATIENT
Start: 2023-09-19 | End: 2023-09-19 | Stop reason: HOSPADM

## 2023-09-19 RX ORDER — PROPOFOL 10 MG/ML
INJECTION, EMULSION INTRAVENOUS CONTINUOUS PRN
Status: DISCONTINUED | OUTPATIENT
Start: 2023-09-19 | End: 2023-09-19 | Stop reason: SURG

## 2023-09-19 RX ORDER — SODIUM CHLORIDE, SODIUM LACTATE, POTASSIUM CHLORIDE, CALCIUM CHLORIDE 600; 310; 30; 20 MG/100ML; MG/100ML; MG/100ML; MG/100ML
1000 INJECTION, SOLUTION INTRAVENOUS CONTINUOUS
Status: DISCONTINUED | OUTPATIENT
Start: 2023-09-19 | End: 2023-09-19 | Stop reason: HOSPADM

## 2023-09-19 RX ORDER — SODIUM CHLORIDE 0.9 % (FLUSH) 0.9 %
3 SYRINGE (ML) INJECTION EVERY 12 HOURS SCHEDULED
Status: DISCONTINUED | OUTPATIENT
Start: 2023-09-19 | End: 2023-09-19 | Stop reason: HOSPADM

## 2023-09-19 RX ORDER — LIDOCAINE HYDROCHLORIDE 10 MG/ML
0.5 INJECTION, SOLUTION EPIDURAL; INFILTRATION; INTRACAUDAL; PERINEURAL ONCE AS NEEDED
Status: DISCONTINUED | OUTPATIENT
Start: 2023-09-19 | End: 2023-09-19 | Stop reason: HOSPADM

## 2023-09-19 RX ORDER — FENTANYL CITRATE 50 UG/ML
25 INJECTION, SOLUTION INTRAMUSCULAR; INTRAVENOUS
Status: DISCONTINUED | OUTPATIENT
Start: 2023-09-19 | End: 2023-09-19 | Stop reason: HOSPADM

## 2023-09-19 RX ORDER — SODIUM CHLORIDE 9 MG/ML
40 INJECTION, SOLUTION INTRAVENOUS AS NEEDED
Status: DISCONTINUED | OUTPATIENT
Start: 2023-09-19 | End: 2023-09-19 | Stop reason: HOSPADM

## 2023-09-19 RX ORDER — SODIUM CHLORIDE 0.9 % (FLUSH) 0.9 %
3-10 SYRINGE (ML) INJECTION AS NEEDED
Status: DISCONTINUED | OUTPATIENT
Start: 2023-09-19 | End: 2023-09-19 | Stop reason: HOSPADM

## 2023-09-19 RX ORDER — ONDANSETRON 2 MG/ML
4 INJECTION INTRAMUSCULAR; INTRAVENOUS ONCE AS NEEDED
Status: DISCONTINUED | OUTPATIENT
Start: 2023-09-19 | End: 2023-09-19 | Stop reason: HOSPADM

## 2023-09-19 RX ORDER — MAGNESIUM HYDROXIDE 1200 MG/15ML
LIQUID ORAL AS NEEDED
Status: DISCONTINUED | OUTPATIENT
Start: 2023-09-19 | End: 2023-09-19 | Stop reason: HOSPADM

## 2023-09-19 RX ORDER — CEFAZOLIN SODIUM 1 G/50ML
1000 INJECTION, SOLUTION INTRAVENOUS ONCE
Status: COMPLETED | OUTPATIENT
Start: 2023-09-19 | End: 2023-09-19

## 2023-09-19 RX ADMIN — FENTANYL CITRATE 25 MCG: 50 INJECTION, SOLUTION INTRAMUSCULAR; INTRAVENOUS at 14:30

## 2023-09-19 RX ADMIN — PROPOFOL 75 MCG/KG/MIN: 10 INJECTION, EMULSION INTRAVENOUS at 14:23

## 2023-09-19 RX ADMIN — FENTANYL CITRATE 25 MCG: 50 INJECTION, SOLUTION INTRAMUSCULAR; INTRAVENOUS at 14:25

## 2023-09-19 RX ADMIN — SODIUM CHLORIDE, POTASSIUM CHLORIDE, SODIUM LACTATE AND CALCIUM CHLORIDE 1000 ML: 600; 310; 30; 20 INJECTION, SOLUTION INTRAVENOUS at 11:50

## 2023-09-19 RX ADMIN — CEFAZOLIN SODIUM 2000 MG: 1 INJECTION, SOLUTION INTRAVENOUS at 14:28

## 2023-09-19 NOTE — ANESTHESIA PREPROCEDURE EVALUATION
Anesthesia Evaluation     Patient summary reviewed and Nursing notes reviewed   no history of anesthetic complications:   NPO Solid Status: > 8 hours  NPO Liquid Status: > 8 hours           Airway   Mallampati: II  TM distance: >3 FB  Dental          Pulmonary - negative pulmonary ROS   (-) asthma, sleep apnea, not a smoker  Cardiovascular   Exercise tolerance: excellent (>7 METS)    (+) hypertension  (-) past MI, CAD, dysrhythmias, cardiac stents, hyperlipidemia      Neuro/Psych- negative ROS  (-) seizures, TIA, CVA  GI/Hepatic/Renal/Endo    (+) liver disease (cyst), diabetes mellitus  (-) no renal disease    Musculoskeletal     Abdominal    Substance History      OB/GYN          Other      history of cancer (breast) remission                  Anesthesia Plan    ASA 2     MAC     intravenous induction     Anesthetic plan, risks, benefits, and alternatives have been provided, discussed and informed consent has been obtained with: patient.    CODE STATUS:

## 2023-09-19 NOTE — DISCHARGE INSTRUCTIONS
General Postoperative Instructions  Seth Blackburn MD      ENCOURAGED ACTIVITY  You should make an effort to spend time on your feet as soon as you safely can.  This may require assistance at first.    Standing and walking will dramatically decrease the risk of blood clots.    You should try to walk for a few minutes every hour during waking hours.  You do not need to wake up through the night to do this.    Please sleep with your head elevated for the next 48 hours    DRESSINGS AND FOLLOW-UP  You do not have to keep your incision covered.  Please keep your incision clean and dry except when showering.  It is ok to resume normal showering in 24 hours.  Do not submerge your incisions below water for 2 weeks.  You can apply a very thin layer of ophthalmic antibiotic ointment to your incision twice daily for the next 3 days.  You will have scheduled follow-up in 2 weeks.    Questions or Concerns    If you have additional questions or concerns, please contact [facility] at [phone] during or after office hours. After hours, you will have the option to press #1 to speak to the answering service.  They will be able to put you in touch with Dr. Blackburn if necessary.     In the case of an emergency, please call 911.    Signs and Symptoms of Infection and/or Complication:    Rapid or asymmetric swelling  Swelling that worsens after the first 48 hours  Redness and warmth developing on previously normal-appearing skin  Drainage other than scant blood or blood-tinged clear fluid  Fever or chills  Nausea and vomiting or diarrhea   Separation of the incision  Bleeding that does not stop with pressure    Signs and Symptoms of a Potential Emergency:    If you experience any of the following during your postoperative recovery, this may represent an emergency.  Please call 911 and seek immediate medical attention:    Loss of consciousness or “blacking out”  Worsening shortness of breath or  inability to catch your breath  No onset chest, shoulder, or neck pain  Leg pain or swelling  Light-headedness or dizziness when attempting to stand or walk

## 2023-09-19 NOTE — OP NOTE
FACIAL LESION/CYST EXCISION  Procedure Report    Patient Name:  Nasreen Ayala  YOB: 1945    Date of Surgery:  9/19/2023    Attending Surgeon: Seth Blakcburn MD     PROCEDURE:  Excision of left medial canthal cyst, 0.6 cm  Simple repair, left medial canthus, 1 cm    PREOPERATIVE DIAGNOSIS:  Left medial canthal cyst    POSTOPERATIVE DIAGNOSIS:  Same    ANESTHESIA:  1% lidocaine with epinephrine    INDICATION FOR PROCEDURE:  This is a 77-year-old female with a cystic lesion of her left medial canthal region.  We will proceed with excision as planned.    DESCRIPTION OF PROCEDURE:  The cyst was excised nearly around its base with curved iris scissors.  The entirety of the cystic lining was excised and sent to pathology as a specimen.  The site of excision was converted to a lenticular orientation oriented horizontally and parallel to existing folds along the canthus and radix.  This was repaired with simple running 6-0 plain gut suture.  The site was dressed with ophthalmic safe antibiotic ointment.    BLOOD LOSS:   2 mL    COMPLICATIONS:   None    SPECIMENS:          Left medial canthal cyst    DISPOSITION:   Home and self-care.  Return to clinic in 2 weeks.    STAFF:  Surgeon(s):  Seth Blackburn MD           Electronically signed by Seth Blackburn MD, 09/19/23, 2:48 PM CDT.

## 2023-09-19 NOTE — ANESTHESIA POSTPROCEDURE EVALUATION
"Patient: Nasreen Ayala    Procedure Summary       Date: 09/19/23 Room / Location:  [facility] OR  /  [facility] OR    Anesthesia Start: 1418 Anesthesia Stop: 1456    Procedure: EXCISIONAL BIOPSY OF LEFT MEDIAL CANTHUL LESION (Left) Diagnosis:     Surgeons: Seth Blackburn MD Provider: Jessy Dumont CRNA    Anesthesia Type: MAC ASA Status: 2            Anesthesia Type: MAC    Vitals  Vitals Value Taken Time   /89 09/19/23 1531   Temp     Pulse 105 09/19/23 1531   Resp 16 09/19/23 1454   SpO2 93 % 09/19/23 1531   Vitals shown include unvalidated device data.        Post Anesthesia Care and Evaluation    Patient location during evaluation: PACU  Patient participation: complete - patient participated  Level of consciousness: awake and alert  Pain management: adequate    Airway patency: patent  Anesthetic complications: No anesthetic complications    Cardiovascular status: acceptable  Respiratory status: acceptable  Hydration status: acceptable    Comments: Blood pressure (!) 184/89, pulse 105, temperature 98.2 °F (36.8 °C), temperature source Temporal, resp. rate 16, height 161.5 cm (63.58\"), weight 69 kg (152 lb 1.9 oz), SpO2 93 %, not currently breastfeeding.    Pt discharged from PACU based on lynette score >8    "

## 2023-09-21 LAB
CYTO UR: NORMAL
LAB AP CASE REPORT: NORMAL
Lab: NORMAL
PATH REPORT.FINAL DX SPEC: NORMAL
PATH REPORT.GROSS SPEC: NORMAL

## 2023-09-22 NOTE — H&P
[facility]   Consult Note    Patient Name: Nasreen Ayala  : 1945  MRN: [id number]  Primary Care Physician:  Sera Oseguera APRN  Referring Physician: Seth Blackburn MD  Date of admission: 2023    Consults  Subjective   Subjective     Reason for Consult/ Chief Complaint: Left nasal radix cystic lesion    History of Present Illness  Nasreen Ayala is a 77 y.o. female with a cystic lesion on her left nasal radix.  She presents today to undergo excision.    Review of Systems   Negative except as mentioned above    Personal History     Past Medical History:   Diagnosis Date    Breast cancer     Right    Colon polyp     Diabetes mellitus     Diverticulosis     Hemorrhoids     Hypertension     Lesion of skin of nose 2023    Left medial canthul lesion       Past Surgical History:   Procedure Laterality Date    BREAST BIOPSY       SECTION      COLON SURGERY  2021    COLONOSCOPY  10/29/2014    polyp destroyed, mild diverticulosis    COLONOSCOPY  2008    small internal hemorrhoid, 2 polyps, adenomatous, mild diverticulosis    COLONOSCOPY N/A 2020    Procedure: COLONOSCOPY WITH ANESTHESIA;  Surgeon: Efraín Beck MD;  Location:  [facility] ENDOSCOPY;  Service: Gastroenterology;  Laterality: N/A;  pre: hx of polyps  post: polyps. Bx and ink at hepatic flexure polyp site.diverticulosis.   sera oseguera    COLONOSCOPY N/A 10/04/2021    Procedure: COLONOSCOPY WITH ANESTHESIA;  Surgeon: Efraín Beck MD;  Location: [facility] ENDOSCOPY;  Service: Gastroenterology;  Laterality: N/A;  preop; hx of colon cancer  postop; diverticulosis; polyps   PCP Sera Oseguera     HEAD/NECK LESION/CYST EXCISION Left 2023    Procedure: EXCISIONAL BIOPSY OF LEFT MEDIAL CANTHUL LESION;  Surgeon: Seth Blackburn MD;  Location: [facility] OR;  Service: Plastics;  Laterality: Left;    MASTECTOMY Right        Family History: Her family history includes Colon cancer in her sister; Diabetes in her  "father; Lung cancer in her brother; No Known Problems in her mother.     Social History: She  reports that she has never smoked. She has never used smokeless tobacco. She reports current alcohol use. She reports that she does not use drugs.    Home Medications:  aspirin, furosemide, gabapentin, glimepiride, lisinopril, metFORMIN, raloxifene, and tiZANidine    Allergies:  She has No Known Allergies.    Objective    Objective       Physical Exam:   Cystic lesion of the left nasal radix.  Translucent.  Clear fluid visible within the cyst.  Roughly 6 to 8 mm in diameter.    161.5 cm (63.58\")          09/19/23  1151   Weight: 69 kg (152 lb 1.9 oz)       Assessment & Plan     Assessment / Plan     Brief Patient Summary:  Nasreen Ayala is a 77 y.o. female with a cystic lesion on her left nasal radix.  She presents today to undergo excision.     Active Hospital Problems:  There are no active hospital problems to display for this patient.      DVT prophylaxis:  No DVT prophylaxis order currently exists.      Plan:   Proceed to the OR.  Discharge to home postoperatively      Electronically signed by Seth Blackburn MD, 09/22/23, 9:24 AM CDT.    "

## 2023-10-25 ENCOUNTER — ANESTHESIA EVENT (OUTPATIENT)
Dept: PERIOP | Facility: HOSPITAL | Age: 78
End: 2023-10-25
Payer: MEDICARE

## 2023-10-27 ENCOUNTER — ANESTHESIA (OUTPATIENT)
Dept: PERIOP | Facility: HOSPITAL | Age: 78
End: 2023-10-27
Payer: MEDICARE

## 2023-10-27 ENCOUNTER — HOSPITAL ENCOUNTER (OUTPATIENT)
Facility: HOSPITAL | Age: 78
Setting detail: HOSPITAL OUTPATIENT SURGERY
Discharge: HOME | End: 2023-10-27
Attending: SURGERY | Admitting: SURGERY
Payer: MEDICARE

## 2023-10-27 VITALS
HEIGHT: 64 IN | RESPIRATION RATE: 16 BRPM | HEART RATE: 109 BPM | WEIGHT: 153.22 LBS | BODY MASS INDEX: 26.16 KG/M2 | DIASTOLIC BLOOD PRESSURE: 92 MMHG | OXYGEN SATURATION: 93 % | TEMPERATURE: 97.6 F | SYSTOLIC BLOOD PRESSURE: 197 MMHG

## 2023-10-27 LAB
ANION GAP SERPL CALCULATED.3IONS-SCNC: 11 MMOL/L (ref 5–15)
BUN SERPL-MCNC: 14 MG/DL (ref 8–23)
BUN/CREAT SERPL: 23.3 (ref 7–25)
CALCIUM SPEC-SCNC: 9 MG/DL (ref 8.6–10.5)
CHLORIDE SERPL-SCNC: 100 MMOL/L (ref 98–107)
CO2 SERPL-SCNC: 27 MMOL/L (ref 22–29)
CREAT SERPL-MCNC: 0.6 MG/DL (ref 0.57–1)
DEPRECATED RDW RBC AUTO: 47 FL (ref 37–54)
EGFRCR SERPLBLD CKD-EPI 2021: 93.2 ML/MIN/1.73
ERYTHROCYTE [DISTWIDTH] IN BLOOD BY AUTOMATED COUNT: 12.5 % (ref 12.3–15.4)
GLUCOSE BLDC GLUCOMTR-MCNC: 156 MG/DL (ref 70–130)
GLUCOSE SERPL-MCNC: 160 MG/DL (ref 65–99)
HCT VFR BLD AUTO: 40.8 % (ref 34–46.6)
HGB BLD-MCNC: 13 G/DL (ref 12–15.9)
MCH RBC QN AUTO: 32.4 PG (ref 26.6–33)
MCHC RBC AUTO-ENTMCNC: 31.9 G/DL (ref 31.5–35.7)
MCV RBC AUTO: 101.7 FL (ref 79–97)
PLATELET # BLD AUTO: 203 10*3/MM3 (ref 140–450)
PMV BLD AUTO: 8.8 FL (ref 6–12)
POTASSIUM SERPL-SCNC: 4.2 MMOL/L (ref 3.5–5.2)
QT INTERVAL: 358 MS
QTC INTERVAL: 466 MS
RBC # BLD AUTO: 4.01 10*6/MM3 (ref 3.77–5.28)
SODIUM SERPL-SCNC: 138 MMOL/L (ref 136–145)
WBC NRBC COR # BLD: 4.72 10*3/MM3 (ref 3.4–10.8)

## 2023-10-27 PROCEDURE — 80048 BASIC METABOLIC PNL TOTAL CA: CPT | Performed by: SURGERY

## 2023-10-27 PROCEDURE — 25810000003 LACTATED RINGERS PER 1000 ML: Performed by: SURGERY

## 2023-10-27 PROCEDURE — G0463 HOSPITAL OUTPT CLINIC VISIT: HCPCS | Performed by: SURGERY

## 2023-10-27 PROCEDURE — 93005 ELECTROCARDIOGRAM TRACING: CPT | Performed by: SURGERY

## 2023-10-27 PROCEDURE — 82948 REAGENT STRIP/BLOOD GLUCOSE: CPT

## 2023-10-27 PROCEDURE — 85027 COMPLETE CBC AUTOMATED: CPT | Performed by: SURGERY

## 2023-10-27 RX ORDER — HYDROCODONE BITARTRATE AND ACETAMINOPHEN 5; 325 MG/1; MG/1
1 TABLET ORAL ONCE AS NEEDED
Status: CANCELLED | OUTPATIENT
Start: 2023-10-27

## 2023-10-27 RX ORDER — IBUPROFEN 600 MG/1
600 TABLET ORAL ONCE AS NEEDED
Status: CANCELLED | OUTPATIENT
Start: 2023-10-27

## 2023-10-27 RX ORDER — HYDROCODONE BITARTRATE AND ACETAMINOPHEN 10; 325 MG/1; MG/1
1 TABLET ORAL EVERY 4 HOURS PRN
Status: CANCELLED | OUTPATIENT
Start: 2023-10-27 | End: 2023-11-06

## 2023-10-27 RX ORDER — CEFAZOLIN SODIUM 1 G/50ML
1000 INJECTION, SOLUTION INTRAVENOUS ONCE
Status: DISCONTINUED | OUTPATIENT
Start: 2023-10-27 | End: 2023-10-27 | Stop reason: HOSPADM

## 2023-10-27 RX ORDER — SODIUM CHLORIDE 0.9 % (FLUSH) 0.9 %
3 SYRINGE (ML) INJECTION AS NEEDED
Status: DISCONTINUED | OUTPATIENT
Start: 2023-10-27 | End: 2023-10-27 | Stop reason: HOSPADM

## 2023-10-27 RX ORDER — SODIUM CHLORIDE 0.9 % (FLUSH) 0.9 %
3 SYRINGE (ML) INJECTION EVERY 12 HOURS SCHEDULED
Status: DISCONTINUED | OUTPATIENT
Start: 2023-10-27 | End: 2023-10-27 | Stop reason: HOSPADM

## 2023-10-27 RX ORDER — DROPERIDOL 2.5 MG/ML
0.62 INJECTION, SOLUTION INTRAMUSCULAR; INTRAVENOUS ONCE AS NEEDED
Status: CANCELLED | OUTPATIENT
Start: 2023-10-27

## 2023-10-27 RX ORDER — SULFAMETHOXAZOLE AND TRIMETHOPRIM 800; 160 MG/1; MG/1
1 TABLET ORAL 2 TIMES DAILY
Qty: 14 TABLET | Refills: 0 | Status: SHIPPED | OUTPATIENT
Start: 2023-10-27 | End: 2023-11-03

## 2023-10-27 RX ORDER — LIDOCAINE HYDROCHLORIDE 10 MG/ML
0.5 INJECTION, SOLUTION EPIDURAL; INFILTRATION; INTRACAUDAL; PERINEURAL ONCE AS NEEDED
Status: DISCONTINUED | OUTPATIENT
Start: 2023-10-27 | End: 2023-10-27 | Stop reason: HOSPADM

## 2023-10-27 RX ORDER — SODIUM CHLORIDE 9 MG/ML
40 INJECTION, SOLUTION INTRAVENOUS AS NEEDED
Status: DISCONTINUED | OUTPATIENT
Start: 2023-10-27 | End: 2023-10-27 | Stop reason: HOSPADM

## 2023-10-27 RX ORDER — FENTANYL CITRATE 50 UG/ML
25 INJECTION, SOLUTION INTRAMUSCULAR; INTRAVENOUS
Status: CANCELLED | OUTPATIENT
Start: 2023-10-27

## 2023-10-27 RX ORDER — SODIUM CHLORIDE 0.9 % (FLUSH) 0.9 %
3-10 SYRINGE (ML) INJECTION AS NEEDED
Status: DISCONTINUED | OUTPATIENT
Start: 2023-10-27 | End: 2023-10-27 | Stop reason: HOSPADM

## 2023-10-27 RX ORDER — TOBRAMYCIN AND DEXAMETHASONE 3; 1 MG/ML; MG/ML
1 SUSPENSION/ DROPS OPHTHALMIC
Qty: 1 ML | Refills: 0 | Status: SHIPPED | OUTPATIENT
Start: 2023-10-27 | End: 2023-10-31

## 2023-10-27 RX ORDER — LABETALOL HYDROCHLORIDE 5 MG/ML
5 INJECTION, SOLUTION INTRAVENOUS
Status: CANCELLED | OUTPATIENT
Start: 2023-10-27

## 2023-10-27 RX ORDER — NALOXONE HCL 0.4 MG/ML
0.4 VIAL (ML) INJECTION AS NEEDED
Status: CANCELLED | OUTPATIENT
Start: 2023-10-27

## 2023-10-27 RX ORDER — FLUMAZENIL 0.1 MG/ML
0.2 INJECTION INTRAVENOUS AS NEEDED
Status: CANCELLED | OUTPATIENT
Start: 2023-10-27

## 2023-10-27 RX ORDER — ONDANSETRON 2 MG/ML
4 INJECTION INTRAMUSCULAR; INTRAVENOUS ONCE AS NEEDED
Status: CANCELLED | OUTPATIENT
Start: 2023-10-27

## 2023-10-27 RX ORDER — SODIUM CHLORIDE, SODIUM LACTATE, POTASSIUM CHLORIDE, CALCIUM CHLORIDE 600; 310; 30; 20 MG/100ML; MG/100ML; MG/100ML; MG/100ML
1000 INJECTION, SOLUTION INTRAVENOUS CONTINUOUS
Status: DISCONTINUED | OUTPATIENT
Start: 2023-10-27 | End: 2023-10-27 | Stop reason: HOSPADM

## 2023-10-27 RX ORDER — SODIUM CHLORIDE, SODIUM LACTATE, POTASSIUM CHLORIDE, CALCIUM CHLORIDE 600; 310; 30; 20 MG/100ML; MG/100ML; MG/100ML; MG/100ML
100 INJECTION, SOLUTION INTRAVENOUS CONTINUOUS
Status: DISCONTINUED | OUTPATIENT
Start: 2023-10-27 | End: 2023-10-27 | Stop reason: HOSPADM

## 2023-10-27 RX ADMIN — SODIUM CHLORIDE, POTASSIUM CHLORIDE, SODIUM LACTATE AND CALCIUM CHLORIDE 1000 ML: 600; 310; 30; 20 INJECTION, SOLUTION INTRAVENOUS at 09:43

## 2023-10-27 NOTE — INTERVAL H&P NOTE
Patient was examined in the preoperative holding area and was found to have what appears to be a preseptal cellulitis of the left periorbita. She provides a history of developing intense itching of the left upper eyelid approximately 10 to 14 days ago and has now developed redness of the eyelid that she says has been there for approximately one week. She denies any pain associated with these symptoms, and also has not had any changes in  vision or foreign body sensation within the eye.    I informed the patient that we cannot safely proceed the surgery with these findings. I regret that I was not made aware of these issues earlier. I will prescribe the patient antibiotic eyedrops and oral antibiotics and see her in my clinic this coming week.

## 2023-11-02 DIAGNOSIS — Z01.818 PRE-OP TESTING: Primary | ICD-10-CM

## 2023-11-06 DIAGNOSIS — Z01.818 PRE-OP TESTING: ICD-10-CM

## 2023-11-06 LAB
ALBUMIN SERPL-MCNC: 4.1 G/DL (ref 3.5–5.2)
ALP SERPL-CCNC: 62 U/L (ref 35–104)
ALT SERPL-CCNC: 13 U/L (ref 5–33)
ANION GAP SERPL CALCULATED.3IONS-SCNC: 14 MMOL/L (ref 7–19)
AST SERPL-CCNC: 16 U/L (ref 5–32)
BILIRUB SERPL-MCNC: 0.3 MG/DL (ref 0.2–1.2)
BUN SERPL-MCNC: 19 MG/DL (ref 8–23)
CALCIUM SERPL-MCNC: 9.2 MG/DL (ref 8.8–10.2)
CHLORIDE SERPL-SCNC: 101 MMOL/L (ref 98–111)
CO2 SERPL-SCNC: 23 MMOL/L (ref 22–29)
CREAT SERPL-MCNC: 0.8 MG/DL (ref 0.5–0.9)
ERYTHROCYTE [DISTWIDTH] IN BLOOD BY AUTOMATED COUNT: 12 % (ref 11.5–14.5)
GLUCOSE SERPL-MCNC: 128 MG/DL (ref 74–109)
HCT VFR BLD AUTO: 42 % (ref 37–47)
HGB BLD-MCNC: 13.4 G/DL (ref 12–16)
MCH RBC QN AUTO: 33.8 PG (ref 27–31)
MCHC RBC AUTO-ENTMCNC: 31.9 G/DL (ref 33–37)
MCV RBC AUTO: 106.1 FL (ref 81–99)
PLATELET # BLD AUTO: 227 K/UL (ref 130–400)
PMV BLD AUTO: 9.3 FL (ref 9.4–12.3)
POTASSIUM SERPL-SCNC: 5 MMOL/L (ref 3.5–5)
PROT SERPL-MCNC: 7.6 G/DL (ref 6.6–8.7)
RBC # BLD AUTO: 3.96 M/UL (ref 4.2–5.4)
SODIUM SERPL-SCNC: 138 MMOL/L (ref 136–145)
WBC # BLD AUTO: 6.6 K/UL (ref 4.8–10.8)

## 2023-11-06 NOTE — SIGNIFICANT NOTE
Home Rx;  Instructed to stop Lisinopril x 24 hours before DOS-11/10/23.  Will stop ASA 81 mg starting Tues. 11/07/2023.

## 2023-11-07 LAB
QT INTERVAL: 358 MS
QTC INTERVAL: 466 MS

## 2023-11-17 ENCOUNTER — HOSPITAL ENCOUNTER (OUTPATIENT)
Facility: HOSPITAL | Age: 78
Setting detail: HOSPITAL OUTPATIENT SURGERY
Discharge: HOME | End: 2023-11-17
Attending: SURGERY | Admitting: SURGERY
Payer: MEDICARE

## 2023-11-17 ENCOUNTER — ANESTHESIA (OUTPATIENT)
Dept: PERIOP | Facility: HOSPITAL | Age: 78
End: 2023-11-17
Payer: MEDICARE

## 2023-11-17 ENCOUNTER — ANESTHESIA EVENT (OUTPATIENT)
Dept: PERIOP | Facility: HOSPITAL | Age: 78
End: 2023-11-17
Payer: MEDICARE

## 2023-11-17 VITALS
OXYGEN SATURATION: 94 % | WEIGHT: 149.91 LBS | HEIGHT: 64 IN | TEMPERATURE: 97.2 F | SYSTOLIC BLOOD PRESSURE: 178 MMHG | HEART RATE: 95 BPM | DIASTOLIC BLOOD PRESSURE: 76 MMHG | BODY MASS INDEX: 25.59 KG/M2 | RESPIRATION RATE: 16 BRPM

## 2023-11-17 DIAGNOSIS — R52 PAIN: ICD-10-CM

## 2023-11-17 DIAGNOSIS — C44.91 BASAL CELL CARCINOMA: ICD-10-CM

## 2023-11-17 LAB
GLUCOSE BLDC GLUCOMTR-MCNC: 155 MG/DL (ref 70–130)
LAB AP CASE REPORT: NORMAL
LAB AP SYNOPTIC CHECKLIST: NORMAL
Lab: NORMAL

## 2023-11-17 PROCEDURE — 25810000003 LACTATED RINGERS PER 1000 ML: Performed by: SURGERY

## 2023-11-17 PROCEDURE — 88331 PATH CONSLTJ SURG 1 BLK 1SPC: CPT | Performed by: PATHOLOGY

## 2023-11-17 PROCEDURE — 25010000002 CEFAZOLIN PER 500 MG: Performed by: SURGERY

## 2023-11-17 PROCEDURE — 25010000002 PROPOFOL 200 MG/20ML EMULSION

## 2023-11-17 PROCEDURE — 88305 TISSUE EXAM BY PATHOLOGIST: CPT | Performed by: SURGERY

## 2023-11-17 PROCEDURE — 82948 REAGENT STRIP/BLOOD GLUCOSE: CPT

## 2023-11-17 RX ORDER — DROPERIDOL 2.5 MG/ML
0.62 INJECTION, SOLUTION INTRAMUSCULAR; INTRAVENOUS ONCE AS NEEDED
Status: DISCONTINUED | OUTPATIENT
Start: 2023-11-17 | End: 2023-11-17 | Stop reason: HOSPADM

## 2023-11-17 RX ORDER — ONDANSETRON 2 MG/ML
4 INJECTION INTRAMUSCULAR; INTRAVENOUS ONCE AS NEEDED
Status: DISCONTINUED | OUTPATIENT
Start: 2023-11-17 | End: 2023-11-17 | Stop reason: HOSPADM

## 2023-11-17 RX ORDER — MAGNESIUM HYDROXIDE 1200 MG/15ML
LIQUID ORAL AS NEEDED
Status: DISCONTINUED | OUTPATIENT
Start: 2023-11-17 | End: 2023-11-17 | Stop reason: HOSPADM

## 2023-11-17 RX ORDER — NALOXONE HCL 0.4 MG/ML
0.4 VIAL (ML) INJECTION AS NEEDED
Status: DISCONTINUED | OUTPATIENT
Start: 2023-11-17 | End: 2023-11-17 | Stop reason: HOSPADM

## 2023-11-17 RX ORDER — IBUPROFEN 600 MG/1
600 TABLET ORAL ONCE AS NEEDED
Status: DISCONTINUED | OUTPATIENT
Start: 2023-11-17 | End: 2023-11-17 | Stop reason: HOSPADM

## 2023-11-17 RX ORDER — SODIUM CHLORIDE, SODIUM LACTATE, POTASSIUM CHLORIDE, CALCIUM CHLORIDE 600; 310; 30; 20 MG/100ML; MG/100ML; MG/100ML; MG/100ML
100 INJECTION, SOLUTION INTRAVENOUS CONTINUOUS
Status: DISCONTINUED | OUTPATIENT
Start: 2023-11-17 | End: 2023-11-17 | Stop reason: HOSPADM

## 2023-11-17 RX ORDER — FLUMAZENIL 0.1 MG/ML
0.2 INJECTION INTRAVENOUS AS NEEDED
Status: DISCONTINUED | OUTPATIENT
Start: 2023-11-17 | End: 2023-11-17 | Stop reason: HOSPADM

## 2023-11-17 RX ORDER — LIDOCAINE HYDROCHLORIDE AND EPINEPHRINE 10; 10 MG/ML; UG/ML
INJECTION, SOLUTION INFILTRATION; PERINEURAL AS NEEDED
Status: DISCONTINUED | OUTPATIENT
Start: 2023-11-17 | End: 2023-11-17 | Stop reason: HOSPADM

## 2023-11-17 RX ORDER — ACETAMINOPHEN 500 MG
1000 TABLET ORAL ONCE
Status: COMPLETED | OUTPATIENT
Start: 2023-11-17 | End: 2023-11-17

## 2023-11-17 RX ORDER — LIDOCAINE HYDROCHLORIDE 10 MG/ML
0.5 INJECTION, SOLUTION EPIDURAL; INFILTRATION; INTRACAUDAL; PERINEURAL ONCE AS NEEDED
Status: DISCONTINUED | OUTPATIENT
Start: 2023-11-17 | End: 2023-11-17 | Stop reason: HOSPADM

## 2023-11-17 RX ORDER — SODIUM CHLORIDE 9 MG/ML
40 INJECTION, SOLUTION INTRAVENOUS AS NEEDED
Status: DISCONTINUED | OUTPATIENT
Start: 2023-11-17 | End: 2023-11-17 | Stop reason: HOSPADM

## 2023-11-17 RX ORDER — SODIUM CHLORIDE, SODIUM LACTATE, POTASSIUM CHLORIDE, CALCIUM CHLORIDE 600; 310; 30; 20 MG/100ML; MG/100ML; MG/100ML; MG/100ML
1000 INJECTION, SOLUTION INTRAVENOUS CONTINUOUS
Status: DISCONTINUED | OUTPATIENT
Start: 2023-11-17 | End: 2023-11-17 | Stop reason: HOSPADM

## 2023-11-17 RX ORDER — NEOMYCIN SULFATE, POLYMYXIN B SULFATE AND BACITRACIN ZINC 3.5; 10000; 4 MG/G; [USP'U]/G; [USP'U]/G
OINTMENT OPHTHALMIC AS NEEDED
Status: DISCONTINUED | OUTPATIENT
Start: 2023-11-17 | End: 2023-11-17 | Stop reason: HOSPADM

## 2023-11-17 RX ORDER — SODIUM CHLORIDE 0.9 % (FLUSH) 0.9 %
3 SYRINGE (ML) INJECTION AS NEEDED
Status: DISCONTINUED | OUTPATIENT
Start: 2023-11-17 | End: 2023-11-17 | Stop reason: HOSPADM

## 2023-11-17 RX ORDER — LABETALOL HYDROCHLORIDE 5 MG/ML
5 INJECTION, SOLUTION INTRAVENOUS
Status: DISCONTINUED | OUTPATIENT
Start: 2023-11-17 | End: 2023-11-17 | Stop reason: HOSPADM

## 2023-11-17 RX ORDER — SODIUM CHLORIDE 0.9 % (FLUSH) 0.9 %
3 SYRINGE (ML) INJECTION EVERY 12 HOURS SCHEDULED
Status: DISCONTINUED | OUTPATIENT
Start: 2023-11-17 | End: 2023-11-17 | Stop reason: HOSPADM

## 2023-11-17 RX ORDER — SODIUM CHLORIDE 0.9 % (FLUSH) 0.9 %
3-10 SYRINGE (ML) INJECTION AS NEEDED
Status: DISCONTINUED | OUTPATIENT
Start: 2023-11-17 | End: 2023-11-17 | Stop reason: HOSPADM

## 2023-11-17 RX ORDER — MIDAZOLAM HYDROCHLORIDE 1 MG/ML
0.5 INJECTION INTRAMUSCULAR; INTRAVENOUS
Status: DISCONTINUED | OUTPATIENT
Start: 2023-11-17 | End: 2023-11-17 | Stop reason: HOSPADM

## 2023-11-17 RX ORDER — HYDROCODONE BITARTRATE AND ACETAMINOPHEN 5; 325 MG/1; MG/1
1 TABLET ORAL ONCE AS NEEDED
Status: DISCONTINUED | OUTPATIENT
Start: 2023-11-17 | End: 2023-11-17 | Stop reason: HOSPADM

## 2023-11-17 RX ORDER — PROPOFOL 10 MG/ML
INJECTION, EMULSION INTRAVENOUS AS NEEDED
Status: DISCONTINUED | OUTPATIENT
Start: 2023-11-17 | End: 2023-11-17 | Stop reason: SURG

## 2023-11-17 RX ORDER — LIDOCAINE HYDROCHLORIDE 20 MG/ML
INJECTION, SOLUTION EPIDURAL; INFILTRATION; INTRACAUDAL; PERINEURAL AS NEEDED
Status: DISCONTINUED | OUTPATIENT
Start: 2023-11-17 | End: 2023-11-17 | Stop reason: SURG

## 2023-11-17 RX ORDER — HYDROCODONE BITARTRATE AND ACETAMINOPHEN 10; 325 MG/1; MG/1
1 TABLET ORAL EVERY 4 HOURS PRN
Status: DISCONTINUED | OUTPATIENT
Start: 2023-11-17 | End: 2023-11-17 | Stop reason: HOSPADM

## 2023-11-17 RX ORDER — FENTANYL CITRATE 50 UG/ML
25 INJECTION, SOLUTION INTRAMUSCULAR; INTRAVENOUS
Status: DISCONTINUED | OUTPATIENT
Start: 2023-11-17 | End: 2023-11-17 | Stop reason: HOSPADM

## 2023-11-17 RX ADMIN — PROPOFOL 50 MG: 10 INJECTION, EMULSION INTRAVENOUS at 12:40

## 2023-11-17 RX ADMIN — PROPOFOL 50 MCG/KG/MIN: 10 INJECTION, EMULSION INTRAVENOUS at 12:41

## 2023-11-17 RX ADMIN — ACETAMINOPHEN TAB 500 MG 1000 MG: 500 TAB at 10:51

## 2023-11-17 RX ADMIN — SODIUM CHLORIDE, POTASSIUM CHLORIDE, SODIUM LACTATE AND CALCIUM CHLORIDE 1000 ML: 600; 310; 30; 20 INJECTION, SOLUTION INTRAVENOUS at 10:21

## 2023-11-17 RX ADMIN — CEFAZOLIN 2000 MG: 2 INJECTION, POWDER, FOR SOLUTION INTRAMUSCULAR; INTRAVENOUS at 12:38

## 2023-11-17 RX ADMIN — LIDOCAINE HYDROCHLORIDE 60 MG: 20 INJECTION, SOLUTION EPIDURAL; INFILTRATION; INTRACAUDAL; PERINEURAL at 12:40

## 2023-11-17 NOTE — DISCHARGE INSTRUCTIONS
General Postoperative Instructions  Seth Blackburn MD      ENCOURAGED ACTIVITY  You should make an effort to spend time on your feet as soon as you safely can.  This may require assistance at first.    Standing and walking will dramatically decrease the risk of blood clots.    You should try to walk for a few minutes every hour during waking hours.  You do not need to wake up through the night to do this.      DRESSINGS AND FOLLOW-UP  Please keep your dressings in place for 24 hours.  Please keep your dressings clean and dry at all times.  After removing your dressings, you do not need to keep your incision covered.  It is ok to resume normal showering tomorrow.  Do not submerge your incisions below water for 2 weeks.  You will have scheduled follow-up in 5 days.    MEDICATIONS  Please take over the counter pain medication as needed for pain.     Questions or Concerns    If you have additional questions or concerns, please contact [facility] at [phone] during or after office hours. After hours, you will have the option to press #1 to speak to the answering service.  They will be able to put you in touch with Dr. Blackburn if necessary.     In the case of an emergency, please call 911.    Signs and Symptoms of Infection and/or Complication:    Rapid or asymmetric swelling  Swelling that worsens after the first 48 hours  Redness and warmth developing on previously normal-appearing skin  Drainage other than scant blood or blood-tinged clear fluid  Fever or chills  Nausea and vomiting or diarrhea   Separation of the incision  Bleeding that does not stop with pressure    Signs and Symptoms of a Potential Emergency:    If you experience any of the following during your postoperative recovery, this may represent an emergency.  Please call 911 and seek immediate medical attention:    Loss of consciousness or “blacking out”  Worsening shortness of breath or inability to catch your  breath  No onset chest, shoulder, or neck pain  Leg pain or swelling  Light-headedness or dizziness when attempting to stand or walk

## 2023-11-17 NOTE — ANESTHESIA POSTPROCEDURE EVALUATION
"Patient: Nasreen Ayala    Procedure Summary       Date: 11/17/23 Room / Location: [facility] OR  /  [facility] OR    Anesthesia Start: 1233 Anesthesia Stop: 1358    Procedure: RE-EXCISION OF LEFT MEDIAL CANTHUS BASAL CELL CARCINOMA (Left) Diagnosis:     Surgeons: Seth Blackburn MD Provider: Yamilka Thurston CRNA    Anesthesia Type: MAC ASA Status: 2            Anesthesia Type: MAC    Vitals  Vitals Value Taken Time   /76 11/17/23 1431   Temp     Pulse 94 11/17/23 1433   Resp 16 11/17/23 1357   SpO2 92 % 11/17/23 1433   Vitals shown include unfiled device data.        Post Anesthesia Care and Evaluation    Patient location during evaluation: PACU  Patient participation: complete - patient participated  Level of consciousness: awake and alert  Pain management: adequate    Airway patency: patent  Anesthetic complications: No anesthetic complications  PONV Status: none  Cardiovascular status: acceptable and hemodynamically stable  Respiratory status: acceptable  Hydration status: acceptable    Comments: Blood pressure 178/76, pulse 95, temperature 97.2 °F (36.2 °C), temperature source Temporal, resp. rate 16, height 161.5 cm (63.58\"), weight 68 kg (149 lb 14.6 oz), SpO2 94%, not currently breastfeeding.    Patient discharged from PACU based upon Anderson score. Please see RN notes for further details    "

## 2023-11-17 NOTE — ANESTHESIA POSTPROCEDURE EVALUATION
"Patient: Nasreen Ayala    Procedure Summary       Date: 11/17/23 Room / Location: [facility] OR 11 /  [facility] OR    Anesthesia Start: 1233 Anesthesia Stop: 1358    Procedure: RE-EXCISION OF LEFT MEDIAL CANTHUS BASAL CELL CARCINOMA (Left) Diagnosis:     Surgeons: Seth Blackburn MD Provider: Yamilka Thurston CRNA    Anesthesia Type: MAC ASA Status: 2            Anesthesia Type: MAC    Vitals  Vitals Value Taken Time   /76 11/17/23 1431   Temp     Pulse 94 11/17/23 1433   Resp 16 11/17/23 1357   SpO2 92 % 11/17/23 1433   Vitals shown include unfiled device data.        Post Anesthesia Care and Evaluation    Patient location during evaluation: PHASE II  Patient participation: complete - patient participated  Level of consciousness: awake and awake and alert  Pain score: 0  Pain management: adequate    Airway patency: patent  Anesthetic complications: No anesthetic complications  PONV Status: none  Cardiovascular status: acceptable  Respiratory status: acceptable  Hydration status: acceptable    Comments: Patient discharged according to acceptable Anderson score per RN assessment. See nursing records for further information.     Blood pressure 178/76, pulse 95, temperature 97.2 °F (36.2 °C), temperature source Temporal, resp. rate 16, height 161.5 cm (63.58\"), weight 68 kg (149 lb 14.6 oz), SpO2 94%, not currently breastfeeding.      "

## 2023-11-17 NOTE — OP NOTE
HEAD NECK LESION/CYST EXCISION  Procedure Report    Patient Name:  Nasreen Ayala  YOB: 1945    Date of Surgery:  11/17/2023    Attending Surgeon: Seth Blackburn MD     PROCEDURE:  1.  Excision of left nasal radix basal cell carcinoma, 0.6 cm  2.  Intermediate repair, left nasal radix, 1 cm    PREOPERATIVE DIAGNOSIS:  Basal cell carcinoma, left nasal radix    POSTOPERATIVE DIAGNOSIS:  Same    ANESTHESIA:  MAC with local none    INDICATION FOR PROCEDURE:  This is a 78-year-old female who underwent excision of a cystic lesion from her left nasal radix in September.  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  We will proceed today with excision with frozen sections to confirm clearance.    OPERATIVE FINDINGS:  A circumferential specimen was obtained around the point of initial lesion excision.  Basal cell carcinoma was seen within this specimen, but margins were clear.    DESCRIPTION OF PROCEDURE:  The lesion was excised circumferentially with narrow adjacent margins of grossly normal dermis and full-thickness through the dermis.  The specimen was sent to pathology for frozen sections.  Residual basal cell carcinoma was found within the specimen but did not extend to the margins.  The resulting defect was converted to a lenticular orientation and was reapproximated with deep dermal 5-0 Vicryl and simple running 5-0 nylon.  The site was dressed with ointment, gauze, and silk tape.    BLOOD LOSS:   3 mL    COMPLICATIONS:   None    SPECIMENS:          Left nasal radix lesion, stitch 12:00    DISPOSITION:   Home and self-care.  Return to clinic next week.    STAFF:  Surgeon(s):  Seth Blackburn MD           Electronically signed by Seth Blackburn MD, 11/17/23, 1:56 PM CST.

## 2023-11-25 LAB
CYTO UR: NORMAL
LAB AP CASE REPORT: NORMAL
Lab: NORMAL
Lab: NORMAL
PATH REPORT.FINAL DX SPEC: NORMAL
PATH REPORT.GROSS SPEC: NORMAL

## 2023-12-12 ENCOUNTER — TELEPHONE (OUTPATIENT)
Dept: HEMATOLOGY | Age: 78
End: 2023-12-12

## 2023-12-12 NOTE — PROGRESS NOTES
[facility]  Impression:             30 mm hepatic flexure polyp located in between two folds which was proximal to a previously placed tattoo marking. Incomplete removal with piecemeal EMR as described above, with a portion of the proximal margin of the polyp that was not amenable to endoscopic resection. 2.   One 2 mm polyp in the rectum, removed with a jumbo cold forceps. Resected and retrieved. Pathology:  1. Colon, hepatic flexure polyp, endoscopic mucosal resection - Fragments of tubular adenoma, see note    Note: Margins of excision cannot be commented upon due to fragmented nature of the biopsy  2.  . Rectum, polyp, polypectomy - Hyperplastic polyp   Recommendation:  Refer to colorectal surgery for consideration of surgical management of incompletely resected large hepatic flexure polyp. CT Abd & Pelvis on 12/9/2020 at [facility] revealed:  No metastatic disease identified in the abdomen or pelvis. Multiple hepatic cysts, the largest of which demonstrates wall calcification and may represent a biliary cystadenoma, celiac foregut cyst, or a cyst complicated by prior hemorrhage/inflammation. Follow-up MRI in 3-6 months is recommended. Alternatively, comparison with prior studies may be helpful if available. 12/17/2020 - Right hemicolectomy at [facility] in [facility]):  Lucy Rod. Lauro Graves M.D., Khushi Matos M.D., Kiran Michael M.D. Diagnosis:  A. Colon, right colon, right colectomy      - Adenocarcinoma with signet ring features, associated with tubular adenoma  - Tumor invades submucosa, pT1  - Perineural invasion present  - Lymphovascular space invasion is not identified  - Margins are negative for tumor  - Appendix with no histopathologic abnormality  - No evidence of malignancy in twenty-two lymph nodes (0/22)    Virgilio Ugarte was evaluated by medical oncology at [facility]. She was told that no further intervention or work-up would be necessary.

## 2023-12-13 ENCOUNTER — HOSPITAL ENCOUNTER (OUTPATIENT)
Dept: INFUSION THERAPY | Age: 78
Discharge: HOME | End: 2023-12-13
Payer: MEDICARE

## 2023-12-13 ENCOUNTER — OFFICE VISIT (OUTPATIENT)
Dept: HEMATOLOGY | Age: 78
End: 2023-12-13
Payer: MEDICARE

## 2023-12-13 VITALS
SYSTOLIC BLOOD PRESSURE: 123 MMHG | WEIGHT: 154 LBS | OXYGEN SATURATION: 97 % | BODY MASS INDEX: 27.29 KG/M2 | HEART RATE: 119 BPM | HEIGHT: 63 IN | DIASTOLIC BLOOD PRESSURE: 82 MMHG | TEMPERATURE: 97.9 F

## 2023-12-13 DIAGNOSIS — R53.83 FATIGUE, UNSPECIFIED TYPE: ICD-10-CM

## 2023-12-13 DIAGNOSIS — D75.89 MACROCYTIC: ICD-10-CM

## 2023-12-13 DIAGNOSIS — Z00.00 HEALTHCARE MAINTENANCE: ICD-10-CM

## 2023-12-13 DIAGNOSIS — R71.8 ELEVATED MCV: ICD-10-CM

## 2023-12-13 DIAGNOSIS — D75.89 MACROCYTIC: Primary | ICD-10-CM

## 2023-12-13 DIAGNOSIS — R16.0 LIVER MASS: ICD-10-CM

## 2023-12-13 LAB
ALBUMIN SERPL-MCNC: 4.6 G/DL (ref 3.5–5.2)
ALP SERPL-CCNC: 60 U/L (ref 35–104)
ALT SERPL-CCNC: 23 U/L (ref 9–52)
ANION GAP SERPL CALCULATED.3IONS-SCNC: 9 MMOL/L (ref 7–19)
AST SERPL-CCNC: 33 U/L (ref 14–36)
BASOPHILS # BLD: 0.06 K/UL (ref 0.01–0.08)
BASOPHILS NFR BLD: 0.9 % (ref 0.1–1.2)
BILIRUB SERPL-MCNC: 0.5 MG/DL (ref 0.2–1.3)
BUN SERPL-MCNC: 22 MG/DL (ref 7–17)
CALCIUM SERPL-MCNC: 9.2 MG/DL (ref 8.4–10.2)
CHLORIDE SERPL-SCNC: 102 MMOL/L (ref 98–111)
CO2 SERPL-SCNC: 28 MMOL/L (ref 22–29)
CREAT SERPL-MCNC: 0.8 MG/DL (ref 0.5–1)
EOSINOPHIL # BLD: 0.15 K/UL (ref 0.04–0.54)
EOSINOPHIL NFR BLD: 2.2 % (ref 0.7–7)
ERYTHROCYTE [DISTWIDTH] IN BLOOD BY AUTOMATED COUNT: 12.3 % (ref 11.7–14.4)
FOLATE SERPL-MCNC: 11.2 NG/ML (ref 4.8–37.3)
GLOBULIN: 3.6 G/DL
GLUCOSE SERPL-MCNC: 163 MG/DL (ref 74–106)
HCT VFR BLD AUTO: 42 % (ref 34.1–44.9)
HGB BLD-MCNC: 13.5 G/DL (ref 11.2–15.7)
LYMPHOCYTES # BLD: 2 K/UL (ref 1.18–3.74)
LYMPHOCYTES NFR BLD: 29.6 % (ref 19.3–53.1)
MCH RBC QN AUTO: 32.5 PG (ref 25.6–32.2)
MCHC RBC AUTO-ENTMCNC: 32.1 G/DL (ref 32.3–35.5)
MCV RBC AUTO: 101 FL (ref 79.4–94.8)
MONOCYTES # BLD: 0.61 K/UL (ref 0.24–0.82)
MONOCYTES NFR BLD: 9 % (ref 4.7–12.5)
NEUTROPHILS # BLD: 3.89 K/UL (ref 1.56–6.13)
NEUTS SEG NFR BLD: 57.7 % (ref 34–71.1)
PLATELET # BLD AUTO: 211 K/UL (ref 182–369)
PMV BLD AUTO: 8.6 FL (ref 7.4–10.4)
POTASSIUM SERPL-SCNC: 4.9 MMOL/L (ref 3.5–5.1)
PROT SERPL-MCNC: 8.2 G/DL (ref 6.3–8.2)
RBC # BLD AUTO: 4.16 M/UL (ref 3.93–5.22)
SODIUM SERPL-SCNC: 139 MMOL/L (ref 137–145)
TSH SERPL DL<=0.005 MIU/L-ACNC: 2.97 UIU/ML (ref 0.27–4.2)
VIT B12 SERPL-MCNC: 312 PG/ML (ref 211–946)
WBC # BLD AUTO: 6.75 K/UL (ref 3.98–10.04)

## 2023-12-13 PROCEDURE — 1036F TOBACCO NON-USER: CPT | Performed by: INTERNAL MEDICINE

## 2023-12-13 PROCEDURE — G8484 FLU IMMUNIZE NO ADMIN: HCPCS | Performed by: INTERNAL MEDICINE

## 2023-12-13 PROCEDURE — G8417 CALC BMI ABV UP PARAM F/U: HCPCS | Performed by: INTERNAL MEDICINE

## 2023-12-13 PROCEDURE — G8399 PT W/DXA RESULTS DOCUMENT: HCPCS | Performed by: INTERNAL MEDICINE

## 2023-12-13 PROCEDURE — 1123F ACP DISCUSS/DSCN MKR DOCD: CPT | Performed by: INTERNAL MEDICINE

## 2023-12-13 PROCEDURE — 3079F DIAST BP 80-89 MM HG: CPT | Performed by: INTERNAL MEDICINE

## 2023-12-13 PROCEDURE — 1090F PRES/ABSN URINE INCON ASSESS: CPT | Performed by: INTERNAL MEDICINE

## 2023-12-13 PROCEDURE — 36415 COLL VENOUS BLD VENIPUNCTURE: CPT

## 2023-12-13 PROCEDURE — 80053 COMPREHEN METABOLIC PANEL: CPT

## 2023-12-13 PROCEDURE — 99212 OFFICE O/P EST SF 10 MIN: CPT

## 2023-12-13 PROCEDURE — G8427 DOCREV CUR MEDS BY ELIG CLIN: HCPCS | Performed by: INTERNAL MEDICINE

## 2023-12-13 PROCEDURE — 3074F SYST BP LT 130 MM HG: CPT | Performed by: INTERNAL MEDICINE

## 2023-12-13 PROCEDURE — 85025 COMPLETE CBC W/AUTO DIFF WBC: CPT

## 2023-12-13 PROCEDURE — 99214 OFFICE O/P EST MOD 30 MIN: CPT | Performed by: INTERNAL MEDICINE

## 2023-12-15 DIAGNOSIS — D75.89 MACROCYTIC: ICD-10-CM

## 2023-12-15 LAB
BASOPHILS # BLD: 0.1 K/UL (ref 0–0.2)
BASOPHILS NFR BLD: 1.1 % (ref 0–1)
EOSINOPHIL # BLD: 0.1 K/UL (ref 0–0.6)
EOSINOPHIL NFR BLD: 2.1 % (ref 0–5)
ERYTHROCYTE [DISTWIDTH] IN BLOOD BY AUTOMATED COUNT: 12.5 % (ref 11.5–14.5)
HCT VFR BLD AUTO: 40.3 % (ref 37–47)
HGB BLD-MCNC: 12.3 G/DL (ref 12–16)
IMM GRANULOCYTES # BLD: 0 K/UL
LYMPHOCYTES # BLD: 2 K/UL (ref 1.1–4.5)
LYMPHOCYTES NFR BLD: 34.6 % (ref 20–40)
MCH RBC QN AUTO: 33.1 PG (ref 27–31)
MCHC RBC AUTO-ENTMCNC: 30.5 G/DL (ref 33–37)
MCV RBC AUTO: 108.3 FL (ref 81–99)
MONOCYTES # BLD: 0.5 K/UL (ref 0–0.9)
MONOCYTES NFR BLD: 8.9 % (ref 0–10)
NEUTROPHILS # BLD: 3 K/UL (ref 1.5–7.5)
NEUTS SEG NFR BLD: 52.6 % (ref 50–65)
PLATELET # BLD AUTO: 215 K/UL (ref 130–400)
PMV BLD AUTO: 9.5 FL (ref 9.4–12.3)
RBC # BLD AUTO: 3.72 M/UL (ref 4.2–5.4)
WBC # BLD AUTO: 5.7 K/UL (ref 4.8–10.8)

## 2024-01-02 DIAGNOSIS — I10 ESSENTIAL HYPERTENSION: ICD-10-CM

## 2024-01-02 DIAGNOSIS — C50.911 MALIGNANT NEOPLASM OF RIGHT FEMALE BREAST, UNSPECIFIED ESTROGEN RECEPTOR STATUS, UNSPECIFIED SITE OF BREAST (HCC): ICD-10-CM

## 2024-01-02 DIAGNOSIS — M54.30 SCIATIC NERVE PAIN, UNSPECIFIED LATERALITY: ICD-10-CM

## 2024-01-02 DIAGNOSIS — E11.9 TYPE 2 DIABETES MELLITUS WITHOUT COMPLICATION, WITHOUT LONG-TERM CURRENT USE OF INSULIN (HCC): ICD-10-CM

## 2024-01-03 NOTE — TELEPHONE ENCOUNTER
Last OV 8/7/2023  Next OV Visit date not found      Requested Prescriptions     Pending Prescriptions Disp Refills    gabapentin (NEURONTIN) 100 MG capsule [Pharmacy Med Name: Gabapentin 100 MG Oral Capsule] 180 capsule 0     Sig: Take 1 capsule by mouth 2 times daily.    furosemide (LASIX) 20 MG tablet [Pharmacy Med Name: Furosemide 20 MG Oral Tablet] 90 tablet 0     Sig: Take 1 tablet by mouth once daily    tiZANidine (ZANAFLEX) 4 MG tablet [Pharmacy Med Name: tiZANidine HCl 4 MG Oral Tablet] 90 tablet 0     Sig: TAKE 1 TABLET BY MOUTH AT BEDTIME    glimepiride (AMARYL) 2 MG tablet [Pharmacy Med Name: Glimepiride 2 MG Oral Tablet] 90 tablet 0     Sig: TAKE 1 TABLET BY MOUTH ONCE DAILY IN THE MORNING BEFORE BREAKFAST    metFORMIN (GLUCOPHAGE) 1000 MG tablet [Pharmacy Med Name: metFORMIN HCl 1000 MG Oral Tablet] 180 tablet 0     Sig: TAKE 1 TABLET BY MOUTH TWICE DAILY WITH MEALS    raloxifene (EVISTA) 60 MG tablet [Pharmacy Med Name: Raloxifene HCl 60 MG Oral Tablet] 90 tablet 0     Sig: Take 1 tablet by mouth once daily

## 2024-01-04 RX ORDER — TIZANIDINE 4 MG/1
4 TABLET ORAL NIGHTLY
Qty: 90 TABLET | Refills: 0 | Status: SHIPPED | OUTPATIENT
Start: 2024-01-04

## 2024-01-04 RX ORDER — FUROSEMIDE 20 MG/1
20 TABLET ORAL DAILY
Qty: 90 TABLET | Refills: 0 | Status: SHIPPED | OUTPATIENT
Start: 2024-01-04

## 2024-01-04 RX ORDER — GLIMEPIRIDE 2 MG/1
TABLET ORAL
Qty: 90 TABLET | Refills: 0 | Status: SHIPPED | OUTPATIENT
Start: 2024-01-04

## 2024-01-04 RX ORDER — GABAPENTIN 100 MG/1
100 CAPSULE ORAL 2 TIMES DAILY
Qty: 180 CAPSULE | Refills: 0 | Status: SHIPPED | OUTPATIENT
Start: 2024-01-04 | End: 2024-07-02

## 2024-01-04 RX ORDER — RALOXIFENE HYDROCHLORIDE 60 MG/1
TABLET, FILM COATED ORAL
Qty: 90 TABLET | Refills: 0 | Status: SHIPPED | OUTPATIENT
Start: 2024-01-04

## 2024-01-05 DIAGNOSIS — D75.89 MACROCYTIC: ICD-10-CM

## 2024-01-05 LAB
BASOPHILS # BLD: 0.1 K/UL (ref 0–0.2)
BASOPHILS NFR BLD: 1.2 % (ref 0–1)
EOSINOPHIL # BLD: 0.1 K/UL (ref 0–0.6)
EOSINOPHIL NFR BLD: 2.1 % (ref 0–5)
ERYTHROCYTE [DISTWIDTH] IN BLOOD BY AUTOMATED COUNT: 12.4 % (ref 11.5–14.5)
HCT VFR BLD AUTO: 41.4 % (ref 37–47)
HGB BLD-MCNC: 13 G/DL (ref 12–16)
IMM GRANULOCYTES # BLD: 0 K/UL
LYMPHOCYTES # BLD: 2 K/UL (ref 1.1–4.5)
LYMPHOCYTES NFR BLD: 38.5 % (ref 20–40)
MCH RBC QN AUTO: 33 PG (ref 27–31)
MCHC RBC AUTO-ENTMCNC: 31.4 G/DL (ref 33–37)
MCV RBC AUTO: 105.1 FL (ref 81–99)
MONOCYTES # BLD: 0.6 K/UL (ref 0–0.9)
MONOCYTES NFR BLD: 11.5 % (ref 0–10)
NEUTROPHILS # BLD: 2.4 K/UL (ref 1.5–7.5)
NEUTS SEG NFR BLD: 46.3 % (ref 50–65)
PLATELET # BLD AUTO: 214 K/UL (ref 130–400)
PMV BLD AUTO: 9.3 FL (ref 9.4–12.3)
RBC # BLD AUTO: 3.94 M/UL (ref 4.2–5.4)
WBC # BLD AUTO: 5.1 K/UL (ref 4.8–10.8)

## 2024-01-05 PROCEDURE — 36415 COLL VENOUS BLD VENIPUNCTURE: CPT | Performed by: INTERNAL MEDICINE

## 2024-01-08 ENCOUNTER — OFFICE VISIT (OUTPATIENT)
Dept: FAMILY MEDICINE CLINIC | Age: 79
End: 2024-01-08
Payer: MEDICARE

## 2024-01-08 VITALS
HEART RATE: 102 BPM | DIASTOLIC BLOOD PRESSURE: 70 MMHG | HEIGHT: 63 IN | OXYGEN SATURATION: 96 % | TEMPERATURE: 98.8 F | WEIGHT: 154 LBS | SYSTOLIC BLOOD PRESSURE: 128 MMHG | BODY MASS INDEX: 27.29 KG/M2

## 2024-01-08 DIAGNOSIS — Z79.899 MEDICATION MANAGEMENT: ICD-10-CM

## 2024-01-08 DIAGNOSIS — M54.30 SCIATIC NERVE PAIN, UNSPECIFIED LATERALITY: ICD-10-CM

## 2024-01-08 DIAGNOSIS — C50.911 MALIGNANT NEOPLASM OF RIGHT FEMALE BREAST, UNSPECIFIED ESTROGEN RECEPTOR STATUS, UNSPECIFIED SITE OF BREAST (HCC): ICD-10-CM

## 2024-01-08 DIAGNOSIS — I10 ESSENTIAL HYPERTENSION: ICD-10-CM

## 2024-01-08 DIAGNOSIS — E11.9 TYPE 2 DIABETES MELLITUS WITHOUT COMPLICATION, WITHOUT LONG-TERM CURRENT USE OF INSULIN (HCC): Primary | ICD-10-CM

## 2024-01-08 DIAGNOSIS — E11.9 TYPE 2 DIABETES MELLITUS WITHOUT COMPLICATION, WITHOUT LONG-TERM CURRENT USE OF INSULIN (HCC): ICD-10-CM

## 2024-01-08 LAB
CHOLEST SERPL-MCNC: 191 MG/DL (ref 160–199)
HBA1C MFR BLD: 7.8 % (ref 4–6)
HDLC SERPL-MCNC: 59 MG/DL (ref 65–121)
LDLC SERPL CALC-MCNC: 101 MG/DL
Lab: NORMAL
REPORT: NORMAL
THIS TEST SENT TO: NORMAL
TRIGL SERPL-MCNC: 156 MG/DL (ref 0–149)

## 2024-01-08 PROCEDURE — 3078F DIAST BP <80 MM HG: CPT | Performed by: NURSE PRACTITIONER

## 2024-01-08 PROCEDURE — 1036F TOBACCO NON-USER: CPT | Performed by: NURSE PRACTITIONER

## 2024-01-08 PROCEDURE — G8417 CALC BMI ABV UP PARAM F/U: HCPCS | Performed by: NURSE PRACTITIONER

## 2024-01-08 PROCEDURE — 1123F ACP DISCUSS/DSCN MKR DOCD: CPT | Performed by: NURSE PRACTITIONER

## 2024-01-08 PROCEDURE — 99214 OFFICE O/P EST MOD 30 MIN: CPT | Performed by: NURSE PRACTITIONER

## 2024-01-08 PROCEDURE — 1090F PRES/ABSN URINE INCON ASSESS: CPT | Performed by: NURSE PRACTITIONER

## 2024-01-08 PROCEDURE — G8484 FLU IMMUNIZE NO ADMIN: HCPCS | Performed by: NURSE PRACTITIONER

## 2024-01-08 PROCEDURE — G8399 PT W/DXA RESULTS DOCUMENT: HCPCS | Performed by: NURSE PRACTITIONER

## 2024-01-08 PROCEDURE — G8427 DOCREV CUR MEDS BY ELIG CLIN: HCPCS | Performed by: NURSE PRACTITIONER

## 2024-01-08 PROCEDURE — 3074F SYST BP LT 130 MM HG: CPT | Performed by: NURSE PRACTITIONER

## 2024-01-08 RX ORDER — FUROSEMIDE 20 MG/1
20 TABLET ORAL DAILY
Qty: 90 TABLET | Refills: 1 | Status: SHIPPED | OUTPATIENT
Start: 2024-01-08

## 2024-01-08 RX ORDER — TIZANIDINE 4 MG/1
4 TABLET ORAL NIGHTLY
Qty: 90 TABLET | Refills: 1 | Status: SHIPPED | OUTPATIENT
Start: 2024-01-08

## 2024-01-08 RX ORDER — GABAPENTIN 100 MG/1
100 CAPSULE ORAL 2 TIMES DAILY
Qty: 180 CAPSULE | Refills: 1 | Status: SHIPPED | OUTPATIENT
Start: 2024-01-08 | End: 2024-07-06

## 2024-01-08 RX ORDER — LISINOPRIL 10 MG/1
10 TABLET ORAL 2 TIMES DAILY
Qty: 180 TABLET | Refills: 1 | Status: SHIPPED | OUTPATIENT
Start: 2024-01-08

## 2024-01-08 RX ORDER — RALOXIFENE HYDROCHLORIDE 60 MG/1
60 TABLET, FILM COATED ORAL DAILY
Qty: 90 TABLET | Refills: 1 | Status: SHIPPED | OUTPATIENT
Start: 2024-01-08

## 2024-01-08 ASSESSMENT — PATIENT HEALTH QUESTIONNAIRE - PHQ9
1. LITTLE INTEREST OR PLEASURE IN DOING THINGS: 0
2. FEELING DOWN, DEPRESSED OR HOPELESS: 0
SUM OF ALL RESPONSES TO PHQ QUESTIONS 1-9: 0
SUM OF ALL RESPONSES TO PHQ9 QUESTIONS 1 & 2: 0
SUM OF ALL RESPONSES TO PHQ QUESTIONS 1-9: 0

## 2024-01-08 ASSESSMENT — ENCOUNTER SYMPTOMS
TROUBLE SWALLOWING: 0
COUGH: 0
ABDOMINAL PAIN: 0
SHORTNESS OF BREATH: 0
SORE THROAT: 0
CONSTIPATION: 0
BACK PAIN: 1

## 2024-01-08 NOTE — PROGRESS NOTES
lisinopril (PRINIVIL;ZESTRIL) 10 MG tablet Take 1 tablet by mouth 2 times daily Yes Josefina Ballesteros APRN   gabapentin (NEURONTIN) 100 MG capsule Take 1 capsule by mouth 2 times daily for 180 days. Max Daily Amount: 200 mg Yes Josefina Ballesteros APRN   tiZANidine (ZANAFLEX) 4 MG tablet Take 1 tablet by mouth at bedtime Yes Josefina Ballesteros APRN   blood glucose test strips (ASCENSIA AUTODISC VI;ONE TOUCH ULTRA TEST VI) strip 1 each by In Vitro route daily Daily . One touch ultra mini E11.9 Yes Josefina Ballesteros APRN   glimepiride (AMARYL) 2 MG tablet TAKE 1 TABLET BY MOUTH ONCE DAILY IN THE MORNING BEFORE BREAKFAST Yes Josefina Ballesteros APRN   ibuprofen (ADVIL;MOTRIN) 800 MG tablet Take 1 tablet by mouth 2 times daily as needed for Pain Yes Josefina Ballesteros APRN   blood glucose test strips (ONE TOUCH ULTRA TEST) strip USE TO TEST BLOOD SUGAR TWICE DAILY E11.9 Yes Josefina Ballesteros APRN   UNABLE TO FIND Test strips insurance preferred  2 times a day FBS and 2 hour after meal  E11.9 Yes Josefina Ballesteros APRN   UNABLE TO FIND E11.9  glucomter Device  Insurance preference Yes Josefina Ballesteros APRN   Lancets MISC 2 times a day Yes Josefina Ballesteros APRN   aspirin 81 MG tablet Take 1 tablet by mouth daily Yes Provider, MD Gianna     No Known Allergies    Review of Systems   Constitutional:  Negative for fever and unexpected weight change.   HENT:  Negative for sore throat and trouble swallowing.    Respiratory:  Negative for cough and shortness of breath.    Cardiovascular:  Negative for chest pain and palpitations.   Gastrointestinal:  Negative for abdominal pain and constipation.   Genitourinary: Negative.    Musculoskeletal:  Positive for back pain and myalgias. Negative for neck pain.   Neurological:  Negative for dizziness and headaches.   Psychiatric/Behavioral:  Positive for sleep disturbance. Negative for dysphoric mood. The patient is not nervous/anxious.        OBJECTIVE:    Physical

## 2024-01-11 LAB
AMPHETAMINES SERPL QL SCN: NEGATIVE NG/ML
ANNOTATION COMMENT IMP: NORMAL
BARBITURATES SERPL QL SCN: NEGATIVE NG/ML
BENZODIAZ SERPL QL SCN: NEGATIVE NG/ML
BUPRENORPHINE SERPL-MCNC: NEGATIVE NG/ML
CANNABINOIDS SERPL QL SCN: NEGATIVE NG/ML
COCAINE SERPL QL SCN: NEGATIVE NG/ML
METHADONE SERPL QL SCN: NEGATIVE NG/ML
METHAMPHET SERPL QL: NEGATIVE NG/ML
OPIATES SERPL QL SCN: NEGATIVE NG/ML
OXYCODONE SERPL QL: NEGATIVE NG/ML
PCP SERPL QL SCN: NEGATIVE NG/ML

## 2024-01-16 LAB — MISCELLANEOUS LAB TEST ORDER: NORMAL

## 2024-01-17 LAB — MISCELLANEOUS LAB TEST RESULT: NORMAL

## 2024-01-18 LAB
MISCELLANEOUS LAB TEST ORDER: NORMAL
MISCELLANEOUS LAB TEST RESULT: NORMAL

## 2024-02-12 ENCOUNTER — TELEPHONE (OUTPATIENT)
Dept: FAMILY MEDICINE CLINIC | Age: 79
End: 2024-02-12

## 2024-02-12 NOTE — TELEPHONE ENCOUNTER
Pt called stating the last time she was here there was a discussion about a medication for weight loss and diabetes. She said she hasn't hear anything else about it and wanted to know if she was going to be put on it.

## 2024-02-15 NOTE — TELEPHONE ENCOUNTER
I spoke with patient and she is going to call [place] pharmacy to see if they have the medication for her to . I told her to call if there are any issues.

## 2024-04-11 ENCOUNTER — TELEPHONE (OUTPATIENT)
Dept: FAMILY MEDICINE CLINIC | Age: 79
End: 2024-04-11

## 2024-04-11 DIAGNOSIS — E11.9 TYPE 2 DIABETES MELLITUS WITHOUT COMPLICATION, WITHOUT LONG-TERM CURRENT USE OF INSULIN (HCC): Primary | ICD-10-CM

## 2024-04-11 NOTE — TELEPHONE ENCOUNTER
This patient just wants a lab order put in. If you could let me know and I will call her back. Thanks!

## 2024-04-15 DIAGNOSIS — E11.9 TYPE 2 DIABETES MELLITUS WITHOUT COMPLICATION, WITHOUT LONG-TERM CURRENT USE OF INSULIN (HCC): ICD-10-CM

## 2024-04-15 LAB — HBA1C MFR BLD: 8.5 % (ref 4–6)

## 2024-04-18 ENCOUNTER — OFFICE VISIT (OUTPATIENT)
Dept: FAMILY MEDICINE CLINIC | Age: 79
End: 2024-04-18
Payer: MEDICARE

## 2024-04-18 VITALS
HEART RATE: 79 BPM | TEMPERATURE: 98.7 F | OXYGEN SATURATION: 94 % | SYSTOLIC BLOOD PRESSURE: 130 MMHG | WEIGHT: 157 LBS | BODY MASS INDEX: 27.81 KG/M2 | DIASTOLIC BLOOD PRESSURE: 84 MMHG | RESPIRATION RATE: 17 BRPM

## 2024-04-18 DIAGNOSIS — E11.9 TYPE 2 DIABETES MELLITUS WITHOUT COMPLICATION, WITHOUT LONG-TERM CURRENT USE OF INSULIN (HCC): ICD-10-CM

## 2024-04-18 DIAGNOSIS — I10 ESSENTIAL HYPERTENSION: ICD-10-CM

## 2024-04-18 DIAGNOSIS — M54.30 SCIATIC NERVE PAIN, UNSPECIFIED LATERALITY: ICD-10-CM

## 2024-04-18 DIAGNOSIS — C50.911 MALIGNANT NEOPLASM OF RIGHT FEMALE BREAST, UNSPECIFIED ESTROGEN RECEPTOR STATUS, UNSPECIFIED SITE OF BREAST (HCC): ICD-10-CM

## 2024-04-18 PROCEDURE — 3075F SYST BP GE 130 - 139MM HG: CPT | Performed by: NURSE PRACTITIONER

## 2024-04-18 PROCEDURE — 1036F TOBACCO NON-USER: CPT | Performed by: NURSE PRACTITIONER

## 2024-04-18 PROCEDURE — 99214 OFFICE O/P EST MOD 30 MIN: CPT | Performed by: NURSE PRACTITIONER

## 2024-04-18 PROCEDURE — 3079F DIAST BP 80-89 MM HG: CPT | Performed by: NURSE PRACTITIONER

## 2024-04-18 PROCEDURE — G8427 DOCREV CUR MEDS BY ELIG CLIN: HCPCS | Performed by: NURSE PRACTITIONER

## 2024-04-18 PROCEDURE — 1123F ACP DISCUSS/DSCN MKR DOCD: CPT | Performed by: NURSE PRACTITIONER

## 2024-04-18 PROCEDURE — 1090F PRES/ABSN URINE INCON ASSESS: CPT | Performed by: NURSE PRACTITIONER

## 2024-04-18 PROCEDURE — G8399 PT W/DXA RESULTS DOCUMENT: HCPCS | Performed by: NURSE PRACTITIONER

## 2024-04-18 PROCEDURE — G8417 CALC BMI ABV UP PARAM F/U: HCPCS | Performed by: NURSE PRACTITIONER

## 2024-04-18 PROCEDURE — 3052F HG A1C>EQUAL 8.0%<EQUAL 9.0%: CPT | Performed by: NURSE PRACTITIONER

## 2024-04-18 RX ORDER — GLIMEPIRIDE 2 MG/1
TABLET ORAL
Qty: 90 TABLET | Refills: 0 | Status: SHIPPED | OUTPATIENT
Start: 2024-04-18

## 2024-04-18 RX ORDER — ORAL SEMAGLUTIDE 7 MG/1
1 TABLET ORAL DAILY
Qty: 30 TABLET | Refills: 3 | Status: SHIPPED | OUTPATIENT
Start: 2024-04-18

## 2024-04-18 RX ORDER — GABAPENTIN 100 MG/1
100 CAPSULE ORAL 2 TIMES DAILY
Qty: 180 CAPSULE | Refills: 1 | Status: SHIPPED | OUTPATIENT
Start: 2024-04-18 | End: 2024-10-15

## 2024-04-18 RX ORDER — RALOXIFENE HYDROCHLORIDE 60 MG/1
60 TABLET, FILM COATED ORAL DAILY
Qty: 90 TABLET | Refills: 1 | Status: SHIPPED | OUTPATIENT
Start: 2024-04-18

## 2024-04-18 RX ORDER — LISINOPRIL 10 MG/1
10 TABLET ORAL 2 TIMES DAILY
Qty: 180 TABLET | Refills: 1 | Status: SHIPPED | OUTPATIENT
Start: 2024-04-18

## 2024-04-18 RX ORDER — GABAPENTIN 100 MG/1
100 CAPSULE ORAL 2 TIMES DAILY
Qty: 180 CAPSULE | Refills: 1 | Status: SHIPPED | OUTPATIENT
Start: 2024-04-18 | End: 2024-04-18

## 2024-04-18 RX ORDER — FUROSEMIDE 20 MG/1
20 TABLET ORAL DAILY
Qty: 90 TABLET | Refills: 1 | Status: SHIPPED | OUTPATIENT
Start: 2024-04-18

## 2024-04-18 RX ORDER — TIZANIDINE 4 MG/1
4 TABLET ORAL NIGHTLY
Qty: 90 TABLET | Refills: 1 | Status: SHIPPED | OUTPATIENT
Start: 2024-04-18

## 2024-04-18 RX ORDER — GABAPENTIN 100 MG/1
100 CAPSULE ORAL 2 TIMES DAILY
Qty: 180 CAPSULE | Refills: 1 | Status: SHIPPED | OUTPATIENT
Start: 2024-04-18 | End: 2024-04-18 | Stop reason: CLARIF

## 2024-04-18 RX ORDER — ORAL SEMAGLUTIDE 7 MG/1
1 TABLET ORAL DAILY
Qty: 30 TABLET | Refills: 3 | Status: CANCELLED | OUTPATIENT
Start: 2024-04-18

## 2024-04-18 SDOH — ECONOMIC STABILITY: HOUSING INSECURITY
IN THE LAST 12 MONTHS, WAS THERE A TIME WHEN YOU DID NOT HAVE A STEADY PLACE TO SLEEP OR SLEPT IN A SHELTER (INCLUDING NOW)?: NO

## 2024-04-18 SDOH — ECONOMIC STABILITY: FOOD INSECURITY: WITHIN THE PAST 12 MONTHS, THE FOOD YOU BOUGHT JUST DIDN'T LAST AND YOU DIDN'T HAVE MONEY TO GET MORE.: NEVER TRUE

## 2024-04-18 SDOH — ECONOMIC STABILITY: INCOME INSECURITY: HOW HARD IS IT FOR YOU TO PAY FOR THE VERY BASICS LIKE FOOD, HOUSING, MEDICAL CARE, AND HEATING?: NOT HARD AT ALL

## 2024-04-18 SDOH — ECONOMIC STABILITY: FOOD INSECURITY: WITHIN THE PAST 12 MONTHS, YOU WORRIED THAT YOUR FOOD WOULD RUN OUT BEFORE YOU GOT MONEY TO BUY MORE.: NEVER TRUE

## 2024-04-18 ASSESSMENT — ENCOUNTER SYMPTOMS
ABDOMINAL PAIN: 0
TROUBLE SWALLOWING: 0
BACK PAIN: 1
CONSTIPATION: 0
COUGH: 0
SHORTNESS OF BREATH: 0

## 2024-04-18 ASSESSMENT — PATIENT HEALTH QUESTIONNAIRE - PHQ9
SUM OF ALL RESPONSES TO PHQ9 QUESTIONS 1 & 2: 0
SUM OF ALL RESPONSES TO PHQ QUESTIONS 1-9: 0
1. LITTLE INTEREST OR PLEASURE IN DOING THINGS: NOT AT ALL
2. FEELING DOWN, DEPRESSED OR HOPELESS: NOT AT ALL
SUM OF ALL RESPONSES TO PHQ QUESTIONS 1-9: 0

## 2024-04-18 NOTE — PROGRESS NOTES
Upper Body:      Right upper body: No supraclavicular adenopathy.      Left upper body: No supraclavicular adenopathy.   Skin:     General: Skin is warm and dry.      Coloration: Skin is not pale.      Findings: No lesion or rash.      Nails: There is no clubbing.   Neurological:      Mental Status: She is alert and oriented to person, place, and time.      Motor: No weakness or tremor.      Coordination: Coordination normal.      Deep Tendon Reflexes: Reflexes are normal and symmetric.   Psychiatric:         Attention and Perception: Attention normal.         Mood and Affect: Mood normal.         Speech: Speech normal.         Behavior: Behavior normal. Behavior is cooperative.         Thought Content: Thought content normal.         Cognition and Memory: Cognition and memory normal.         Judgment: Judgment normal.        /84   Pulse 79   Temp 98.7 °F (37.1 °C) (Infrared)   Resp 17   Wt 71.2 kg (157 lb)   SpO2 94%   BMI 27.81 kg/m²      ASSESSMENT:      ICD-10-CM    1. Essential hypertension  I10 furosemide (LASIX) 20 MG tablet     lisinopril (PRINIVIL;ZESTRIL) 10 MG tablet     CT CARDIAC CALCIUM SCORING      2. Sciatic nerve pain, unspecified laterality  M54.30 tiZANidine (ZANAFLEX) 4 MG tablet     gabapentin (NEURONTIN) 100 MG capsule     DISCONTINUED: gabapentin (NEURONTIN) 100 MG capsule     DISCONTINUED: gabapentin (NEURONTIN) 100 MG capsule      3. Type 2 diabetes mellitus without complication, without long-term current use of insulin (HCC)  E11.9 glimepiride (AMARYL) 2 MG tablet     metFORMIN (GLUCOPHAGE) 1000 MG tablet     CT CARDIAC CALCIUM SCORING      4. Malignant neoplasm of right female breast, unspecified estrogen receptor status, unspecified site of breast (HCC)  C50.911 raloxifene (EVISTA) 60 MG tablet          PLAN:    Nina GERMAN Lady: Medication Refill  Plan is to make adjustments on her Rybelsus diabetic medication and recheck in 3 months.  Review cardiac calcium scoring

## 2024-04-19 ENCOUNTER — NURSE ONLY (OUTPATIENT)
Dept: FAMILY MEDICINE CLINIC | Age: 79
End: 2024-04-19

## 2024-04-19 SDOH — ECONOMIC STABILITY: INCOME INSECURITY: HOW HARD IS IT FOR YOU TO PAY FOR THE VERY BASICS LIKE FOOD, HOUSING, MEDICAL CARE, AND HEATING?: NOT HARD AT ALL

## 2024-04-19 SDOH — ECONOMIC STABILITY: FOOD INSECURITY: WITHIN THE PAST 12 MONTHS, THE FOOD YOU BOUGHT JUST DIDN'T LAST AND YOU DIDN'T HAVE MONEY TO GET MORE.: NEVER TRUE

## 2024-04-19 SDOH — ECONOMIC STABILITY: FOOD INSECURITY: WITHIN THE PAST 12 MONTHS, YOU WORRIED THAT YOUR FOOD WOULD RUN OUT BEFORE YOU GOT MONEY TO BUY MORE.: NEVER TRUE

## 2024-04-19 NOTE — PROGRESS NOTES
Question 4/18/2024  3:20 PM CDT - Filed by Patient   How hard is it for you to pay for the very basics like food, housing, medical care, and heating?    Within the past 12 months, you worried that your food would run out before you got the money to buy more.    Within the past 12 months, the food you bought just didn’t last and you didn’t have money to get more.    In the past 12 months, has lack of transportation kept you from meetings, work, or from getting things needed for daily living?    In the last 12 months, was there a time when you did not have a steady place to sleep or slept in a shelter (including now)?

## 2024-04-22 DIAGNOSIS — M54.30 SCIATIC NERVE PAIN, UNSPECIFIED LATERALITY: ICD-10-CM

## 2024-04-22 NOTE — TELEPHONE ENCOUNTER
Pharmacy requests a refill on Nina Elizabeth's medication. Medication was originally sent to pharmacy on 4/18 but was canceled and they were unable to fill. Refill was sent back to us from pharmacy.    Last Office Visit: 4/18/2024  Next Office Visit: Visit date not found     Requested Prescriptions     Pending Prescriptions Disp Refills    gabapentin (NEURONTIN) 100 MG capsule [Pharmacy Med Name: GABAPENTIN 100 MG CAPS 100 Capsule] 180 capsule 1     Sig: Take 1 capsule by mouth 2 times daily.       Jody Mccormick LPN

## 2024-04-23 RX ORDER — GABAPENTIN 100 MG/1
100 CAPSULE ORAL 2 TIMES DAILY
Qty: 180 CAPSULE | Refills: 1 | Status: SHIPPED | OUTPATIENT
Start: 2024-04-23 | End: 2024-10-20

## 2024-04-24 ENCOUNTER — TELEPHONE (OUTPATIENT)
Dept: FAMILY MEDICINE CLINIC | Age: 79
End: 2024-04-24

## 2024-04-24 NOTE — TELEPHONE ENCOUNTER
Called patient, verified name and date of birth, and gave results in detail as per provider notes below. Patient verbalized understanding and had no further questions.

## 2024-04-24 NOTE — TELEPHONE ENCOUNTER
----- Message from TREVON Kumar sent at 4/18/2024 11:24 AM CDT -----  A1c has increased up to 8.7 I have increased the Rybelsus from 3 mg to 7 mg called in new to the new prescription and would like to recheck this in 3 months

## 2024-04-30 ENCOUNTER — HOSPITAL ENCOUNTER (OUTPATIENT)
Dept: CT IMAGING | Age: 79
Discharge: HOME | End: 2024-04-30
Payer: MEDICARE

## 2024-04-30 DIAGNOSIS — E11.9 TYPE 2 DIABETES MELLITUS WITHOUT COMPLICATION, WITHOUT LONG-TERM CURRENT USE OF INSULIN (HCC): ICD-10-CM

## 2024-04-30 DIAGNOSIS — I10 ESSENTIAL HYPERTENSION: ICD-10-CM

## 2024-04-30 PROCEDURE — 75571 CT HRT W/O DYE W/CA TEST: CPT

## 2024-05-02 ENCOUNTER — TELEPHONE (OUTPATIENT)
Dept: FAMILY MEDICINE CLINIC | Age: 79
End: 2024-05-02

## 2024-05-02 DIAGNOSIS — R93.1 ELEVATED CORONARY ARTERY CALCIUM SCORE: Primary | ICD-10-CM

## 2024-05-02 NOTE — TELEPHONE ENCOUNTER
Attempted to call patient to inform her that Dr. Parada retired and to find out who else she would like to see. VM full. Will attempt to call again later.

## 2024-05-02 NOTE — TELEPHONE ENCOUNTER
----- Message from TREVON Kumar sent at 5/2/2024  8:38 AM CDT -----  The CT calcium scoring does show elevation in the RCA I would recommend a cardiology consult who would she like to see

## 2024-05-02 NOTE — TELEPHONE ENCOUNTER
Called patient, verified name and date of birth, and gave results in detail as per provider notes below. Patient verbalized understanding and had no further questions.    Patient agrees to Cardiology referral. She request Dr. Parada at [facility].

## 2024-06-11 DIAGNOSIS — I10 ESSENTIAL HYPERTENSION: ICD-10-CM

## 2024-06-12 NOTE — TELEPHONE ENCOUNTER
Pharmacy requests a refill on Nina Elizabeth's medication.    Last Office Visit: 4/18/2024  Next Office Visit: Visit date not found     Requested Prescriptions     Pending Prescriptions Disp Refills    lisinopril (PRINIVIL;ZESTRIL) 10 MG tablet [Pharmacy Med Name: LISINOPRIL 10 MG TAB 10 Tablet] 180 tablet 1     Sig: Take 1 tablet by mouth 2 times daily       Jody Mccormick LPN

## 2024-06-13 RX ORDER — LISINOPRIL 10 MG/1
10 TABLET ORAL 2 TIMES DAILY
Qty: 180 TABLET | Refills: 1 | Status: SHIPPED | OUTPATIENT
Start: 2024-06-13

## 2024-06-20 ENCOUNTER — OFFICE VISIT (OUTPATIENT)
Dept: FAMILY MEDICINE CLINIC | Age: 79
End: 2024-06-20
Payer: MEDICARE

## 2024-06-20 VITALS
DIASTOLIC BLOOD PRESSURE: 98 MMHG | SYSTOLIC BLOOD PRESSURE: 150 MMHG | HEART RATE: 74 BPM | BODY MASS INDEX: 25.69 KG/M2 | OXYGEN SATURATION: 96 % | TEMPERATURE: 98.4 F | WEIGHT: 145 LBS | HEIGHT: 63 IN

## 2024-06-20 DIAGNOSIS — E11.9 TYPE 2 DIABETES MELLITUS WITHOUT COMPLICATION, WITHOUT LONG-TERM CURRENT USE OF INSULIN (HCC): ICD-10-CM

## 2024-06-20 DIAGNOSIS — I10 ESSENTIAL HYPERTENSION: ICD-10-CM

## 2024-06-20 DIAGNOSIS — I10 ESSENTIAL HYPERTENSION: Primary | ICD-10-CM

## 2024-06-20 LAB
ALBUMIN SERPL-MCNC: 4.5 G/DL (ref 3.5–5.2)
ALP SERPL-CCNC: 68 U/L (ref 35–104)
ALT SERPL-CCNC: 15 U/L (ref 5–33)
ANION GAP SERPL CALCULATED.3IONS-SCNC: 16 MMOL/L (ref 7–19)
AST SERPL-CCNC: 16 U/L (ref 5–32)
BILIRUB SERPL-MCNC: 0.3 MG/DL (ref 0.2–1.2)
BUN SERPL-MCNC: 14 MG/DL (ref 8–23)
CALCIUM SERPL-MCNC: 9.6 MG/DL (ref 8.8–10.2)
CHLORIDE SERPL-SCNC: 99 MMOL/L (ref 98–111)
CHOLEST SERPL-MCNC: 180 MG/DL (ref 160–199)
CO2 SERPL-SCNC: 26 MMOL/L (ref 22–29)
CREAT SERPL-MCNC: 0.7 MG/DL (ref 0.5–0.9)
ERYTHROCYTE [DISTWIDTH] IN BLOOD BY AUTOMATED COUNT: 11.9 % (ref 11.5–14.5)
GLUCOSE SERPL-MCNC: 122 MG/DL (ref 74–109)
HBA1C MFR BLD: 7.1 % (ref 4–6)
HCT VFR BLD AUTO: 42.7 % (ref 37–47)
HDLC SERPL-MCNC: 68 MG/DL (ref 65–121)
HGB BLD-MCNC: 13.5 G/DL (ref 12–16)
LDLC SERPL CALC-MCNC: 94 MG/DL
MCH RBC QN AUTO: 32.3 PG (ref 27–31)
MCHC RBC AUTO-ENTMCNC: 31.6 G/DL (ref 33–37)
MCV RBC AUTO: 102.2 FL (ref 81–99)
PLATELET # BLD AUTO: 250 K/UL (ref 130–400)
PMV BLD AUTO: 9.2 FL (ref 9.4–12.3)
POTASSIUM SERPL-SCNC: 4.9 MMOL/L (ref 3.5–5)
PROT SERPL-MCNC: 7.7 G/DL (ref 6.6–8.7)
RBC # BLD AUTO: 4.18 M/UL (ref 4.2–5.4)
SODIUM SERPL-SCNC: 141 MMOL/L (ref 136–145)
TRIGL SERPL-MCNC: 92 MG/DL (ref 0–149)
WBC # BLD AUTO: 6.1 K/UL (ref 4.8–10.8)

## 2024-06-20 PROCEDURE — 3080F DIAST BP >= 90 MM HG: CPT | Performed by: NURSE PRACTITIONER

## 2024-06-20 PROCEDURE — 1090F PRES/ABSN URINE INCON ASSESS: CPT | Performed by: NURSE PRACTITIONER

## 2024-06-20 PROCEDURE — 3077F SYST BP >= 140 MM HG: CPT | Performed by: NURSE PRACTITIONER

## 2024-06-20 PROCEDURE — 1123F ACP DISCUSS/DSCN MKR DOCD: CPT | Performed by: NURSE PRACTITIONER

## 2024-06-20 PROCEDURE — 3052F HG A1C>EQUAL 8.0%<EQUAL 9.0%: CPT | Performed by: NURSE PRACTITIONER

## 2024-06-20 PROCEDURE — G8417 CALC BMI ABV UP PARAM F/U: HCPCS | Performed by: NURSE PRACTITIONER

## 2024-06-20 PROCEDURE — G8427 DOCREV CUR MEDS BY ELIG CLIN: HCPCS | Performed by: NURSE PRACTITIONER

## 2024-06-20 PROCEDURE — G8399 PT W/DXA RESULTS DOCUMENT: HCPCS | Performed by: NURSE PRACTITIONER

## 2024-06-20 PROCEDURE — 99214 OFFICE O/P EST MOD 30 MIN: CPT | Performed by: NURSE PRACTITIONER

## 2024-06-20 PROCEDURE — 1036F TOBACCO NON-USER: CPT | Performed by: NURSE PRACTITIONER

## 2024-06-20 RX ORDER — GLIMEPIRIDE 2 MG/1
TABLET ORAL
Qty: 90 TABLET | Refills: 1 | Status: SHIPPED | OUTPATIENT
Start: 2024-06-20

## 2024-06-20 RX ORDER — AMLODIPINE BESYLATE 2.5 MG/1
2.5 TABLET ORAL DAILY
Qty: 30 TABLET | Refills: 3 | Status: SHIPPED | OUTPATIENT
Start: 2024-06-20

## 2024-06-20 SDOH — ECONOMIC STABILITY: FOOD INSECURITY: WITHIN THE PAST 12 MONTHS, THE FOOD YOU BOUGHT JUST DIDN'T LAST AND YOU DIDN'T HAVE MONEY TO GET MORE.: NEVER TRUE

## 2024-06-20 SDOH — ECONOMIC STABILITY: INCOME INSECURITY: HOW HARD IS IT FOR YOU TO PAY FOR THE VERY BASICS LIKE FOOD, HOUSING, MEDICAL CARE, AND HEATING?: NOT HARD AT ALL

## 2024-06-20 SDOH — ECONOMIC STABILITY: FOOD INSECURITY: WITHIN THE PAST 12 MONTHS, YOU WORRIED THAT YOUR FOOD WOULD RUN OUT BEFORE YOU GOT MONEY TO BUY MORE.: NEVER TRUE

## 2024-06-20 ASSESSMENT — ENCOUNTER SYMPTOMS
CONSTIPATION: 0
COUGH: 0
ABDOMINAL PAIN: 0
SHORTNESS OF BREATH: 0

## 2024-06-20 ASSESSMENT — PATIENT HEALTH QUESTIONNAIRE - PHQ9
SUM OF ALL RESPONSES TO PHQ QUESTIONS 1-9: 0
1. LITTLE INTEREST OR PLEASURE IN DOING THINGS: NOT AT ALL
SUM OF ALL RESPONSES TO PHQ9 QUESTIONS 1 & 2: 0
SUM OF ALL RESPONSES TO PHQ QUESTIONS 1-9: 0

## 2024-06-20 NOTE — PROGRESS NOTES
Mouth/Throat:      Lips: Pink.      Mouth: Mucous membranes are moist.      Dentition: Normal dentition.      Pharynx: Oropharynx is clear. Uvula midline.   Eyes:      General: Lids are normal.         Right eye: No discharge.         Left eye: No discharge.      Extraocular Movements: Extraocular movements intact.      Conjunctiva/sclera: Conjunctivae normal.      Right eye: Right conjunctiva is not injected.      Left eye: Left conjunctiva is not injected.      Pupils: Pupils are equal, round, and reactive to light.   Neck:      Thyroid: No thyromegaly.      Vascular: No carotid bruit or JVD.   Cardiovascular:      Rate and Rhythm: Normal rate and regular rhythm.      Pulses: Normal pulses.           Carotid pulses are 2+ on the right side and 2+ on the left side.       Radial pulses are 2+ on the right side and 2+ on the left side.      Heart sounds: Normal heart sounds, S1 normal and S2 normal. No murmur heard.  Pulmonary:      Effort: Pulmonary effort is normal.      Breath sounds: Normal breath sounds.   Abdominal:      General: Bowel sounds are normal. There is no distension or abdominal bruit.      Palpations: Abdomen is soft. There is no mass.      Hernia: No hernia is present.   Musculoskeletal:         General: Normal range of motion.      Cervical back: Full passive range of motion without pain, normal range of motion and neck supple.      Right lower leg: No edema.      Left lower leg: No edema.   Lymphadenopathy:      Cervical: No cervical adenopathy.      Right cervical: No superficial cervical adenopathy.     Left cervical: No superficial cervical adenopathy.      Upper Body:      Right upper body: No supraclavicular adenopathy.      Left upper body: No supraclavicular adenopathy.   Skin:     General: Skin is warm and dry.      Coloration: Skin is not pale.      Findings: No lesion or rash.      Nails: There is no clubbing.   Neurological:      Mental Status: She is alert and oriented to person,

## 2024-06-25 ENCOUNTER — OFFICE VISIT (OUTPATIENT)
Dept: CARDIOLOGY CLINIC | Age: 79
End: 2024-06-25
Payer: MEDICARE

## 2024-06-25 VITALS
HEIGHT: 63 IN | WEIGHT: 147 LBS | SYSTOLIC BLOOD PRESSURE: 128 MMHG | BODY MASS INDEX: 26.05 KG/M2 | HEART RATE: 117 BPM | OXYGEN SATURATION: 95 % | DIASTOLIC BLOOD PRESSURE: 88 MMHG

## 2024-06-25 DIAGNOSIS — R06.02 SHORTNESS OF BREATH: Primary | ICD-10-CM

## 2024-06-25 DIAGNOSIS — I25.118 CORONARY ARTERY DISEASE INVOLVING NATIVE CORONARY ARTERY OF NATIVE HEART WITH OTHER FORM OF ANGINA PECTORIS (HCC): ICD-10-CM

## 2024-06-25 DIAGNOSIS — R01.1 HEART MURMUR: ICD-10-CM

## 2024-06-25 PROCEDURE — 3079F DIAST BP 80-89 MM HG: CPT | Performed by: INTERNAL MEDICINE

## 2024-06-25 PROCEDURE — G8417 CALC BMI ABV UP PARAM F/U: HCPCS | Performed by: INTERNAL MEDICINE

## 2024-06-25 PROCEDURE — 1090F PRES/ABSN URINE INCON ASSESS: CPT | Performed by: INTERNAL MEDICINE

## 2024-06-25 PROCEDURE — G8427 DOCREV CUR MEDS BY ELIG CLIN: HCPCS | Performed by: INTERNAL MEDICINE

## 2024-06-25 PROCEDURE — 1036F TOBACCO NON-USER: CPT | Performed by: INTERNAL MEDICINE

## 2024-06-25 PROCEDURE — 1123F ACP DISCUSS/DSCN MKR DOCD: CPT | Performed by: INTERNAL MEDICINE

## 2024-06-25 PROCEDURE — 99204 OFFICE O/P NEW MOD 45 MIN: CPT | Performed by: INTERNAL MEDICINE

## 2024-06-25 PROCEDURE — G8399 PT W/DXA RESULTS DOCUMENT: HCPCS | Performed by: INTERNAL MEDICINE

## 2024-06-25 PROCEDURE — 3074F SYST BP LT 130 MM HG: CPT | Performed by: INTERNAL MEDICINE

## 2024-06-25 PROCEDURE — 93000 ELECTROCARDIOGRAM COMPLETE: CPT | Performed by: INTERNAL MEDICINE

## 2024-06-25 RX ORDER — ATORVASTATIN CALCIUM 20 MG/1
20 TABLET, FILM COATED ORAL DAILY
Qty: 90 TABLET | Refills: 0 | Status: SHIPPED | OUTPATIENT
Start: 2024-06-25

## 2024-06-25 NOTE — PROGRESS NOTES
capsule 1    furosemide (LASIX) 20 MG tablet Take 1 tablet by mouth daily 90 tablet 1    metFORMIN (GLUCOPHAGE) 1000 MG tablet Take 1 tablet by mouth 2 times daily (with meals) 180 tablet 1    raloxifene (EVISTA) 60 MG tablet Take 1 tablet by mouth daily 90 tablet 1    tiZANidine (ZANAFLEX) 4 MG tablet Take 1 tablet by mouth at bedtime 90 tablet 1    blood glucose test strips (ASCENSIA AUTODISC VI;ONE TOUCH ULTRA TEST VI) strip 1 each by In Vitro route daily Daily . One touch ultra mini E11.9 100 each 3    UNABLE TO FIND Test strips insurance preferred  2 times a day FBS and 2 hour after meal  E11.9 100 strip 5    UNABLE TO FIND E11.9  glucomter Device  Insurance preference 1 Device 0    Lancets MISC 2 times a day 100 each 5    aspirin 81 MG tablet Take 1 tablet by mouth daily      ibuprofen (ADVIL;MOTRIN) 800 MG tablet Take 1 tablet by mouth 2 times daily as needed for Pain 180 tablet 1    blood glucose test strips (ONE TOUCH ULTRA TEST) strip USE TO TEST BLOOD SUGAR TWICE DAILY E11.9 100 strip 4     No current facility-administered medications for this visit.       Allergies:  Patient has no known allergies.    Social History:  Social History     Occupational History    Not on file   Tobacco Use    Smoking status: Never    Smokeless tobacco: Never   Substance and Sexual Activity    Alcohol use: Yes     Comment: OCCASIONALLY    Drug use: No    Sexual activity: Not on file         Family History:       Problem Relation Age of Onset    Cancer Sister     Cancer Brother     Ovarian Cancer Maternal Grandmother         OVARIAN    Colon Cancer Neg Hx     Colon Polyps Neg Hx          Physical Examination:  /88   Pulse (!) 117   Ht 1.6 m (5' 3\")   Wt 66.7 kg (147 lb)   SpO2 95%   BMI 26.04 kg/m²   Physical Exam  Vitals reviewed.   Constitutional:       General: She is not in acute distress.     Appearance: Normal appearance. She is not ill-appearing, toxic-appearing or diaphoretic.   HENT:      Head:

## 2024-06-28 ENCOUNTER — HOSPITAL ENCOUNTER (OUTPATIENT)
Dept: WOMENS IMAGING | Age: 79
Discharge: HOME | End: 2024-06-28
Attending: INTERNAL MEDICINE
Payer: MEDICARE

## 2024-06-28 DIAGNOSIS — C50.911 MALIGNANT NEOPLASM OF RIGHT FEMALE BREAST, UNSPECIFIED ESTROGEN RECEPTOR STATUS, UNSPECIFIED SITE OF BREAST (HCC): ICD-10-CM

## 2024-06-28 DIAGNOSIS — Z12.31 ENCOUNTER FOR SCREENING MAMMOGRAM FOR MALIGNANT NEOPLASM OF BREAST: ICD-10-CM

## 2024-06-28 PROCEDURE — 77063 BREAST TOMOSYNTHESIS BI: CPT

## 2024-06-28 ASSESSMENT — ENCOUNTER SYMPTOMS
EYE REDNESS: 0
DIARRHEA: 0
NAUSEA: 0
EYE PAIN: 0
WHEEZING: 0
CHEST TIGHTNESS: 0
COUGH: 0
APNEA: 0
SHORTNESS OF BREATH: 0
CONSTIPATION: 0
EYE DISCHARGE: 0
ABDOMINAL PAIN: 0
VOMITING: 0
SORE THROAT: 0
ABDOMINAL DISTENTION: 0
BLOOD IN STOOL: 0
FACIAL SWELLING: 0

## 2024-07-02 ENCOUNTER — HOSPITAL ENCOUNTER (OUTPATIENT)
Age: 79
Discharge: HOME | End: 2024-07-04
Attending: INTERNAL MEDICINE
Payer: MEDICARE

## 2024-07-02 VITALS
SYSTOLIC BLOOD PRESSURE: 156 MMHG | HEIGHT: 63 IN | WEIGHT: 147 LBS | BODY MASS INDEX: 26.05 KG/M2 | DIASTOLIC BLOOD PRESSURE: 88 MMHG

## 2024-07-02 DIAGNOSIS — R01.1 HEART MURMUR: ICD-10-CM

## 2024-07-02 PROCEDURE — 6360000004 HC RX CONTRAST MEDICATION: Performed by: INTERNAL MEDICINE

## 2024-07-02 PROCEDURE — C8929 TTE W OR WO FOL WCON,DOPPLER: HCPCS

## 2024-07-02 RX ADMIN — PERFLUTREN 1.5 ML: 6.52 INJECTION, SUSPENSION INTRAVENOUS at 15:06

## 2024-07-03 LAB
ECHO AO ROOT DIAM: 2.8 CM
ECHO AO ROOT INDEX: 1.65 CM/M2
ECHO AO SINUS VALSALVA DIAM: 2.9 CM
ECHO AO SINUS VALSALVA INDEX: 1.71 CM/M2
ECHO AO ST JNCT DIAM: 2.2 CM
ECHO AV AREA PEAK VELOCITY: 0.8 CM2
ECHO AV AREA VTI: 0.9 CM2
ECHO AV AREA/BSA PEAK VELOCITY: 0.5 CM2/M2
ECHO AV AREA/BSA VTI: 0.5 CM2/M2
ECHO AV CUSP MM: 0.6 CM
ECHO AV MEAN GRADIENT: 22 MMHG
ECHO AV MEAN VELOCITY: 2.2 M/S
ECHO AV PEAK GRADIENT: 41 MMHG
ECHO AV PEAK VELOCITY: 3.2 M/S
ECHO AV VELOCITY RATIO: 0.25
ECHO AV VTI: 56 CM
ECHO BSA: 1.72 M2
ECHO EST RA PRESSURE: 3 MMHG
ECHO LA AREA 2C: 15.4 CM2
ECHO LA AREA 4C: 12.8 CM2
ECHO LA DIAMETER INDEX: 1.82 CM/M2
ECHO LA DIAMETER: 3.1 CM
ECHO LA MAJOR AXIS: 4.4 CM
ECHO LA MINOR AXIS: 4.8 CM
ECHO LA TO AORTIC ROOT RATIO: 1.11
ECHO LA VOL BP: 37 ML (ref 22–52)
ECHO LA VOL MOD A2C: 40 ML (ref 22–52)
ECHO LA VOL MOD A4C: 31 ML (ref 22–52)
ECHO LA VOL/BSA BIPLANE: 22 ML/M2 (ref 16–34)
ECHO LA VOLUME INDEX MOD A2C: 24 ML/M2 (ref 16–34)
ECHO LA VOLUME INDEX MOD A4C: 18 ML/M2 (ref 16–34)
ECHO LV E' LATERAL VELOCITY: 9 CM/S
ECHO LV EDV A2C: 91 ML
ECHO LV EDV A4C: 83 ML
ECHO LV EDV INDEX A4C: 49 ML/M2
ECHO LV EDV NDEX A2C: 54 ML/M2
ECHO LV EJECTION FRACTION A2C: 58 %
ECHO LV EJECTION FRACTION A4C: 54 %
ECHO LV EJECTION FRACTION BIPLANE: 55 % (ref 55–100)
ECHO LV ESV A2C: 38 ML
ECHO LV ESV A4C: 38 ML
ECHO LV ESV INDEX A2C: 22 ML/M2
ECHO LV ESV INDEX A4C: 22 ML/M2
ECHO LV FRACTIONAL SHORTENING: 35 % (ref 28–44)
ECHO LV INTERNAL DIMENSION DIASTOLE INDEX: 2.35 CM/M2
ECHO LV INTERNAL DIMENSION DIASTOLIC: 4 CM (ref 3.9–5.3)
ECHO LV INTERNAL DIMENSION SYSTOLIC INDEX: 1.53 CM/M2
ECHO LV INTERNAL DIMENSION SYSTOLIC: 2.6 CM
ECHO LV IVSD: 1.4 CM (ref 0.6–0.9)
ECHO LV MASS 2D: 197.6 G (ref 67–162)
ECHO LV MASS INDEX 2D: 116.2 G/M2 (ref 43–95)
ECHO LV POSTERIOR WALL DIASTOLIC: 1.3 CM (ref 0.6–0.9)
ECHO LV RELATIVE WALL THICKNESS RATIO: 0.65
ECHO LVOT AREA: 3.1 CM2
ECHO LVOT AV VTI INDEX: 0.29
ECHO LVOT DIAM: 2 CM
ECHO LVOT MEAN GRADIENT: 1 MMHG
ECHO LVOT PEAK GRADIENT: 3 MMHG
ECHO LVOT PEAK VELOCITY: 0.8 M/S
ECHO LVOT STROKE VOLUME INDEX: 30.5 ML/M2
ECHO LVOT SV: 51.8 ML
ECHO LVOT VTI: 16.5 CM
ECHO MV AREA VTI: 2.2 CM2
ECHO MV LVOT VTI INDEX: 1.42
ECHO MV MAX VELOCITY: 1.5 M/S
ECHO MV MEAN GRADIENT: 3 MMHG
ECHO MV MEAN VELOCITY: 0.8 M/S
ECHO MV PEAK GRADIENT: 8 MMHG
ECHO MV VTI: 23.5 CM
ECHO PV MAX VELOCITY: 1.4 M/S
ECHO PV MEAN GRADIENT: 4 MMHG
ECHO PV MEAN VELOCITY: 0.9 M/S
ECHO PV PEAK GRADIENT: 8 MMHG
ECHO PV VTI: 22.2 CM
ECHO RA AREA 4C: 7.2 CM2
ECHO RA END SYSTOLIC VOLUME APICAL 4 CHAMBER INDEX BSA: 7 ML/M2
ECHO RA VOLUME: 12 ML
ECHO RIGHT VENTRICULAR SYSTOLIC PRESSURE (RVSP): 36 MMHG
ECHO RV BASAL DIMENSION: 2.6 CM
ECHO RV LONGITUDINAL DIMENSION: 6.8 CM
ECHO RV MID DIMENSION: 2.1 CM
ECHO TV REGURGITANT MAX VELOCITY: 2.86 M/S
ECHO TV REGURGITANT PEAK GRADIENT: 33 MMHG

## 2024-07-03 PROCEDURE — 93306 TTE W/DOPPLER COMPLETE: CPT | Performed by: INTERNAL MEDICINE

## 2024-07-03 NOTE — RESULT ENCOUNTER NOTE
Mild aortic stenosis on echocardiogram in 2023.  Disease has progressed to moderate stenosis on most recent study.  Last office visit patient without complaints of shortness of breath or chest pain.  Plan for repeat imaging in 6 months to exclude rapid progression.

## 2024-07-08 DIAGNOSIS — R92.8 ABNORMAL MAMMOGRAM OF LEFT BREAST: Primary | ICD-10-CM

## 2024-07-08 DIAGNOSIS — C50.911 MALIGNANT NEOPLASM OF RIGHT FEMALE BREAST, UNSPECIFIED ESTROGEN RECEPTOR STATUS, UNSPECIFIED SITE OF BREAST (HCC): ICD-10-CM

## 2024-07-12 ENCOUNTER — HOSPITAL ENCOUNTER (OUTPATIENT)
Dept: WOMENS IMAGING | Age: 79
Discharge: HOME | End: 2024-07-12
Payer: MEDICARE

## 2024-07-12 DIAGNOSIS — R92.8 ABNORMAL MAMMOGRAM OF LEFT BREAST: ICD-10-CM

## 2024-07-12 DIAGNOSIS — C50.911 MALIGNANT NEOPLASM OF RIGHT FEMALE BREAST, UNSPECIFIED ESTROGEN RECEPTOR STATUS, UNSPECIFIED SITE OF BREAST (HCC): ICD-10-CM

## 2024-07-12 PROCEDURE — G0279 TOMOSYNTHESIS, MAMMO: HCPCS

## 2024-07-16 DIAGNOSIS — E11.9 TYPE 2 DIABETES MELLITUS WITHOUT COMPLICATION, WITHOUT LONG-TERM CURRENT USE OF INSULIN (HCC): ICD-10-CM

## 2024-07-16 DIAGNOSIS — I10 ESSENTIAL HYPERTENSION: ICD-10-CM

## 2024-07-16 DIAGNOSIS — C50.911 MALIGNANT NEOPLASM OF RIGHT FEMALE BREAST, UNSPECIFIED ESTROGEN RECEPTOR STATUS, UNSPECIFIED SITE OF BREAST (HCC): ICD-10-CM

## 2024-07-17 RX ORDER — FUROSEMIDE 20 MG/1
20 TABLET ORAL DAILY
Qty: 90 TABLET | Refills: 1 | Status: SHIPPED | OUTPATIENT
Start: 2024-07-17

## 2024-07-17 RX ORDER — RALOXIFENE HYDROCHLORIDE 60 MG/1
60 TABLET, FILM COATED ORAL DAILY
Qty: 90 TABLET | Refills: 1 | Status: SHIPPED | OUTPATIENT
Start: 2024-07-17

## 2024-07-17 NOTE — TELEPHONE ENCOUNTER
Nina GERMAN  called to request a refill on her medication.      Last office visit : 6/20/2024   Next office visit : Visit date not found     Requested Prescriptions     Pending Prescriptions Disp Refills    raloxifene (EVISTA) 60 MG tablet [Pharmacy Med Name: RALOXIFENE HCL 60 MG TABS 60 Tablet] 90 tablet 1     Sig: Take 1 tablet by mouth daily    metFORMIN (GLUCOPHAGE) 1000 MG tablet [Pharmacy Med Name: METFORMIN HCL 1000 MG TAB 1000 Tablet] 180 tablet 1     Sig: Take 1 tablet by mouth 2 times daily (with meals)    furosemide (LASIX) 20 MG tablet [Pharmacy Med Name: FUROSEMIDE 20 MG TAB 20 Tablet] 90 tablet 1     Sig: Take 1 tablet by mouth daily            Ileana Theodore LPN

## 2024-07-22 ENCOUNTER — TELEPHONE (OUTPATIENT)
Dept: HEMATOLOGY | Age: 79
End: 2024-07-22

## 2024-07-22 NOTE — TELEPHONE ENCOUNTER
Called and left detailed vm about appt. date and time on 07/30/24. I left message to come at appt. time & not any earlier & we would gladly take care of everything at that time of their follow up appt. time. Left number to call back.

## 2024-07-29 DIAGNOSIS — D75.89 MACROCYTIC: Primary | ICD-10-CM

## 2024-07-29 DIAGNOSIS — C50.911 MALIGNANT NEOPLASM OF RIGHT FEMALE BREAST, UNSPECIFIED ESTROGEN RECEPTOR STATUS, UNSPECIFIED SITE OF BREAST (HCC): ICD-10-CM

## 2024-07-29 NOTE — PROGRESS NOTES
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 Forman on 10/4/2021 with a 3 year recall  Final Diagnosis    Anastomosis site, colon, biopsies:  Hyperplastic colonic mucosa.    Referral is being made for recall colonoscopy    #4  Bone health    DEXA scan at [facility] on 11/26/2018 showed osteopenia  She is on calcium and vitamin D    #5  GYN cancer screening    Talia Favian, her PCP monitors her from the GYN standpoint.      #6   Chronic liver mass abnormality dating back to at least 1993    An ultrasound of the liver on 7/6/2017 documented a large 10.2 x 8.8 x 8.1 cm septated cystic mass in the liver of uncertain nature with a CAT scan of the liver recommended.    A CT scan of the abdomen with IV contrast on 7/10/2017 described a large hypodense left hepatic lobe lesion measuring 9.2 x 8.1 x 8.9 cm in size with a thin septation noted with him it which was partially calcified. No enhancement of the lesion was identified. Other multiple hepatic cysts were also described along with hepatic steatosis.   Records from the last 24 years on this patient were reviewed in detail.    A CT scan of the abdomen and pelvis from 1/28/1993 was found where the cysts were initially described.     An ultrasound of the liver on 2/4/1993 documented the largest cyst measuring 4.8 x 5.7 cm at that time.    Nina does not have symptoms in the right upper quadrant.  Given all of the above findings listed, this is most consistent with a benign process. With conservative yearly ultrasound monitoring only recommended unless changes are found.    The followup ultrasound of the liver on 5/9/2018 revealed stable cysts.     Ultrasound of the liver on 5/6/2019 at [facility] was compared to 5/19/2018 and revealed:  Fatty infiltration of the liver  9 x 9.2 x

## 2024-07-30 ENCOUNTER — OFFICE VISIT (OUTPATIENT)
Dept: HEMATOLOGY | Age: 79
End: 2024-07-30
Payer: MEDICARE

## 2024-07-30 ENCOUNTER — HOSPITAL ENCOUNTER (OUTPATIENT)
Dept: INFUSION THERAPY | Age: 79
Discharge: HOME | End: 2024-07-30
Payer: MEDICARE

## 2024-07-30 VITALS
TEMPERATURE: 98.2 F | HEART RATE: 97 BPM | SYSTOLIC BLOOD PRESSURE: 132 MMHG | BODY MASS INDEX: 25.96 KG/M2 | OXYGEN SATURATION: 97 % | DIASTOLIC BLOOD PRESSURE: 70 MMHG | HEIGHT: 63 IN | WEIGHT: 146.5 LBS

## 2024-07-30 DIAGNOSIS — C50.911 MALIGNANT NEOPLASM OF RIGHT FEMALE BREAST, UNSPECIFIED ESTROGEN RECEPTOR STATUS, UNSPECIFIED SITE OF BREAST (HCC): ICD-10-CM

## 2024-07-30 DIAGNOSIS — Z12.11 ENCOUNTER FOR SCREENING COLONOSCOPY: ICD-10-CM

## 2024-07-30 DIAGNOSIS — Z12.31 ENCOUNTER FOR SCREENING MAMMOGRAM FOR MALIGNANT NEOPLASM OF BREAST: ICD-10-CM

## 2024-07-30 DIAGNOSIS — Z00.00 HEALTHCARE MAINTENANCE: ICD-10-CM

## 2024-07-30 DIAGNOSIS — Z85.038 HISTORY OF COLON CANCER: ICD-10-CM

## 2024-07-30 DIAGNOSIS — R71.8 ELEVATED MCV: ICD-10-CM

## 2024-07-30 DIAGNOSIS — D75.89 MACROCYTIC: ICD-10-CM

## 2024-07-30 DIAGNOSIS — Z45.2 ENCOUNTER FOR CENTRAL LINE CARE: ICD-10-CM

## 2024-07-30 DIAGNOSIS — C50.911 MALIGNANT NEOPLASM OF RIGHT FEMALE BREAST, UNSPECIFIED ESTROGEN RECEPTOR STATUS, UNSPECIFIED SITE OF BREAST (HCC): Primary | ICD-10-CM

## 2024-07-30 LAB
ALBUMIN SERPL-MCNC: 4.1 G/DL (ref 3.5–5.2)
ALP SERPL-CCNC: 57 U/L (ref 35–104)
ALT SERPL-CCNC: 14 U/L (ref 5–33)
ANION GAP SERPL CALCULATED.3IONS-SCNC: 13 MMOL/L (ref 7–19)
AST SERPL-CCNC: 20 U/L (ref 5–32)
BASOPHILS # BLD: 0.06 K/UL (ref 0.01–0.08)
BASOPHILS NFR BLD: 0.8 % (ref 0.1–1.2)
BILIRUB SERPL-MCNC: 0.3 MG/DL (ref 0–1.2)
BUN SERPL-MCNC: 12 MG/DL (ref 8–23)
CALCIUM SERPL-MCNC: 9.1 MG/DL (ref 8.8–10.2)
CHLORIDE SERPL-SCNC: 101 MMOL/L (ref 98–107)
CO2 SERPL-SCNC: 26 MMOL/L (ref 22–29)
CREAT SERPL-MCNC: 0.7 MG/DL (ref 0.5–0.9)
EOSINOPHIL # BLD: 0.22 K/UL (ref 0.04–0.54)
EOSINOPHIL NFR BLD: 2.8 % (ref 0.7–7)
ERYTHROCYTE [DISTWIDTH] IN BLOOD BY AUTOMATED COUNT: 12 % (ref 11.7–14.4)
GLUCOSE SERPL-MCNC: 130 MG/DL (ref 70–99)
HCT VFR BLD AUTO: 41.1 % (ref 34.1–44.9)
HGB BLD-MCNC: 13.4 G/DL (ref 11.2–15.7)
LYMPHOCYTES # BLD: 2.12 K/UL (ref 1.18–3.74)
LYMPHOCYTES NFR BLD: 26.9 % (ref 19.3–53.1)
MCH RBC QN AUTO: 32.9 PG (ref 25.6–32.2)
MCHC RBC AUTO-ENTMCNC: 32.6 G/DL (ref 32.3–35.5)
MCV RBC AUTO: 101 FL (ref 79.4–94.8)
MONOCYTES # BLD: 0.53 K/UL (ref 0.24–0.82)
MONOCYTES NFR BLD: 6.7 % (ref 4.7–12.5)
NEUTROPHILS # BLD: 4.93 K/UL (ref 1.56–6.13)
NEUTS SEG NFR BLD: 62.4 % (ref 34–71.1)
PLATELET # BLD AUTO: 226 K/UL (ref 182–369)
PMV BLD AUTO: 8.7 FL (ref 7.4–10.4)
POTASSIUM SERPL-SCNC: 4.2 MMOL/L (ref 3.5–5.1)
PROT SERPL-MCNC: 7.6 G/DL (ref 6.4–8.3)
RBC # BLD AUTO: 4.07 M/UL (ref 3.93–5.22)
SODIUM SERPL-SCNC: 140 MMOL/L (ref 136–145)
WBC # BLD AUTO: 7.89 K/UL (ref 3.98–10.04)

## 2024-07-30 PROCEDURE — G8417 CALC BMI ABV UP PARAM F/U: HCPCS | Performed by: INTERNAL MEDICINE

## 2024-07-30 PROCEDURE — 99213 OFFICE O/P EST LOW 20 MIN: CPT

## 2024-07-30 PROCEDURE — 36415 COLL VENOUS BLD VENIPUNCTURE: CPT

## 2024-07-30 PROCEDURE — G8399 PT W/DXA RESULTS DOCUMENT: HCPCS | Performed by: INTERNAL MEDICINE

## 2024-07-30 PROCEDURE — 1036F TOBACCO NON-USER: CPT | Performed by: INTERNAL MEDICINE

## 2024-07-30 PROCEDURE — 3075F SYST BP GE 130 - 139MM HG: CPT | Performed by: INTERNAL MEDICINE

## 2024-07-30 PROCEDURE — 1090F PRES/ABSN URINE INCON ASSESS: CPT | Performed by: INTERNAL MEDICINE

## 2024-07-30 PROCEDURE — G8427 DOCREV CUR MEDS BY ELIG CLIN: HCPCS | Performed by: INTERNAL MEDICINE

## 2024-07-30 PROCEDURE — 99214 OFFICE O/P EST MOD 30 MIN: CPT | Performed by: INTERNAL MEDICINE

## 2024-07-30 PROCEDURE — 3078F DIAST BP <80 MM HG: CPT | Performed by: INTERNAL MEDICINE

## 2024-07-30 PROCEDURE — 80053 COMPREHEN METABOLIC PANEL: CPT

## 2024-07-30 PROCEDURE — 85025 COMPLETE CBC W/AUTO DIFF WBC: CPT

## 2024-07-30 PROCEDURE — 1123F ACP DISCUSS/DSCN MKR DOCD: CPT | Performed by: INTERNAL MEDICINE

## 2024-08-05 DIAGNOSIS — M54.30 SCIATIC NERVE PAIN, UNSPECIFIED LATERALITY: ICD-10-CM

## 2024-08-05 RX ORDER — TIZANIDINE 4 MG/1
4 TABLET ORAL NIGHTLY
Qty: 90 TABLET | Refills: 1 | Status: SHIPPED | OUTPATIENT
Start: 2024-08-05

## 2024-08-05 NOTE — TELEPHONE ENCOUNTER
Nina Elizabeth called to request a refill on her medication.      Last office visit : 6/20/2024   Next office visit : Visit date not found     Last UDS:   Benzodiazepines,Bld   Date Value Ref Range Status   01/08/2024 Negative Cutoff 50 ng/mL Final       Last Dani: 8/5/2024  Medication Contract: 1/8/2024   Last Fill: 7/23/2024    Requested Prescriptions     Pending Prescriptions Disp Refills    gabapentin (NEURONTIN) 100 MG capsule [Pharmacy Med Name: GABAPENTIN 100 MG CAPS 100 Capsule] 180 capsule 1     Sig: Take 1 capsule by mouth 2 times daily.     Signed Prescriptions Disp Refills    tiZANidine (ZANAFLEX) 4 MG tablet 90 tablet 1     Sig: TAKE ONE TABLET BY MOUTH EVERY NIGHT AT BEDTIME     Authorizing Provider: RUSTY CEDILLO     Ordering User: CYNDEE ARRIAGA         Please approve or refuse this medication.   Cyndee Arriaga LPN

## 2024-08-06 RX ORDER — GABAPENTIN 100 MG/1
100 CAPSULE ORAL 2 TIMES DAILY
Qty: 180 CAPSULE | Refills: 1 | Status: SHIPPED | OUTPATIENT
Start: 2024-08-06 | End: 2025-02-02

## 2024-08-21 ENCOUNTER — HOSPITAL ENCOUNTER (OUTPATIENT)
Dept: INFUSION THERAPY | Age: 79
Discharge: HOME | End: 2024-08-21
Payer: MEDICARE

## 2024-08-21 DIAGNOSIS — Z45.2 ENCOUNTER FOR CENTRAL LINE CARE: Primary | ICD-10-CM

## 2024-08-21 PROCEDURE — 6360000002 HC RX W HCPCS: Performed by: INTERNAL MEDICINE

## 2024-08-21 PROCEDURE — 2580000003 HC RX 258: Performed by: INTERNAL MEDICINE

## 2024-08-21 PROCEDURE — 96523 IRRIG DRUG DELIVERY DEVICE: CPT

## 2024-08-21 RX ORDER — HEPARIN 100 UNIT/ML
500 SYRINGE INTRAVENOUS PRN
Status: DISCONTINUED | OUTPATIENT
Start: 2024-08-21 | End: 2024-08-22 | Stop reason: HOSPADM

## 2024-08-21 RX ORDER — SODIUM CHLORIDE 0.9 % (FLUSH) 0.9 %
5-40 SYRINGE (ML) INJECTION PRN
Status: DISCONTINUED | OUTPATIENT
Start: 2024-08-21 | End: 2024-08-22 | Stop reason: HOSPADM

## 2024-08-21 RX ADMIN — SODIUM CHLORIDE, PRESERVATIVE FREE 10 ML: 5 INJECTION INTRAVENOUS at 12:36

## 2024-08-21 RX ADMIN — HEPARIN 500 UNITS: 100 SYRINGE at 12:36

## 2024-09-05 DIAGNOSIS — I10 ESSENTIAL HYPERTENSION: ICD-10-CM

## 2024-09-06 RX ORDER — LISINOPRIL 10 MG/1
10 TABLET ORAL 2 TIMES DAILY
Qty: 180 TABLET | Refills: 0 | Status: SHIPPED | OUTPATIENT
Start: 2024-09-06

## 2024-09-06 NOTE — TELEPHONE ENCOUNTER
Nina Elizabeth called to request a refill on her medication.      Last office visit : 6/20/2024   Next office visit : Visit date not found     Requested Prescriptions     Pending Prescriptions Disp Refills    lisinopril (PRINIVIL;ZESTRIL) 10 MG tablet [Pharmacy Med Name: LISINOPRIL 10 MG TAB 10 Tablet] 180 tablet 0     Sig: Take 1 tablet by mouth 2 times daily            Ileana Theodore LPN

## 2024-09-10 ENCOUNTER — HOSPITAL ENCOUNTER (OUTPATIENT)
Dept: NUCLEAR MEDICINE | Age: 79
Discharge: HOME | End: 2024-09-12
Attending: INTERNAL MEDICINE

## 2024-09-10 ENCOUNTER — HOSPITAL ENCOUNTER (OUTPATIENT)
Dept: NUCLEAR MEDICINE | Age: 79
Discharge: HOME | End: 2024-09-12
Attending: INTERNAL MEDICINE
Payer: MEDICARE

## 2024-09-10 DIAGNOSIS — I25.118 CORONARY ARTERY DISEASE INVOLVING NATIVE CORONARY ARTERY OF NATIVE HEART WITH OTHER FORM OF ANGINA PECTORIS (HCC): ICD-10-CM

## 2024-09-10 LAB
NUC STRESS EJECTION FRACTION: 56 %
STRESS BASELINE DIAS BP: 85 MMHG
STRESS BASELINE HR: 96 BPM
STRESS BASELINE SYS BP: 201 MMHG
STRESS EXERCISE DUR MIN: 5 MIN
STRESS EXERCISE DUR SEC: 0 SEC
STRESS PEAK DIAS BP: 102 MMHG
STRESS PEAK SYS BP: 206 MMHG
STRESS PERCENT HR ACHIEVED: 86 %
STRESS POST PEAK HR: 123 BPM
STRESS RATE PRESSURE PRODUCT: NORMAL BPM*MMHG
STRESS STAGE 1 BP: NORMAL MMHG
STRESS STAGE 1 DURATION: 1 MIN:SEC
STRESS STAGE 1 HR: 101 BPM
STRESS STAGE 2 BP: NORMAL MMHG
STRESS STAGE 2 DURATION: 1 MIN:SEC
STRESS STAGE 2 HR: 120 BPM
STRESS STAGE 3 BP: NORMAL MMHG
STRESS STAGE 3 DURATION: 1 MIN:SEC
STRESS STAGE 3 HR: 122 BPM
STRESS STAGE 4 BP: NORMAL MMHG
STRESS STAGE 4 DURATION: 1 MIN:SEC
STRESS STAGE 4 HR: 120 BPM
STRESS STAGE 5 BP: NORMAL MMHG
STRESS STAGE 5 DURATION: 1 MIN:SEC
STRESS STAGE 5 HR: 116 BPM
STRESS STAGE RECOVERY 1 BP: NORMAL MMHG
STRESS STAGE RECOVERY 1 DURATION: 1 MIN:SEC
STRESS STAGE RECOVERY 1 HR: 116 BPM
STRESS TARGET HR: 143 BPM

## 2024-09-10 PROCEDURE — 93018 CV STRESS TEST I&R ONLY: CPT | Performed by: INTERNAL MEDICINE

## 2024-09-10 PROCEDURE — 6360000002 HC RX W HCPCS: Performed by: INTERNAL MEDICINE

## 2024-09-10 PROCEDURE — 78452 HT MUSCLE IMAGE SPECT MULT: CPT | Performed by: INTERNAL MEDICINE

## 2024-09-10 PROCEDURE — 93016 CV STRESS TEST SUPVJ ONLY: CPT | Performed by: INTERNAL MEDICINE

## 2024-09-10 PROCEDURE — 93017 CV STRESS TEST TRACING ONLY: CPT

## 2024-09-10 PROCEDURE — 3430000000 HC RX DIAGNOSTIC RADIOPHARMACEUTICAL: Performed by: INTERNAL MEDICINE

## 2024-09-10 PROCEDURE — A9502 TC99M TETROFOSMIN: HCPCS | Performed by: INTERNAL MEDICINE

## 2024-09-10 PROCEDURE — 78452 HT MUSCLE IMAGE SPECT MULT: CPT

## 2024-09-10 RX ORDER — REGADENOSON 0.08 MG/ML
0.4 INJECTION, SOLUTION INTRAVENOUS
Status: COMPLETED | OUTPATIENT
Start: 2024-09-10 | End: 2024-09-10

## 2024-09-10 RX ADMIN — REGADENOSON 0.4 MG: 0.08 INJECTION, SOLUTION INTRAVENOUS at 12:44

## 2024-09-10 RX ADMIN — TETROFOSMIN 8 MILLICURIE: 0.23 INJECTION, POWDER, LYOPHILIZED, FOR SOLUTION INTRAVENOUS at 13:52

## 2024-09-10 RX ADMIN — TETROFOSMIN 24 MILLICURIE: 0.23 INJECTION, POWDER, LYOPHILIZED, FOR SOLUTION INTRAVENOUS at 13:53

## 2024-09-17 DIAGNOSIS — E11.9 TYPE 2 DIABETES MELLITUS WITHOUT COMPLICATION, WITHOUT LONG-TERM CURRENT USE OF INSULIN (HCC): ICD-10-CM

## 2024-09-17 RX ORDER — GLIMEPIRIDE 2 MG/1
TABLET ORAL
Qty: 90 TABLET | Refills: 1 | Status: SHIPPED | OUTPATIENT
Start: 2024-09-17

## 2024-09-18 ENCOUNTER — OFFICE VISIT (OUTPATIENT)
Dept: CARDIOLOGY CLINIC | Age: 79
End: 2024-09-18
Payer: MEDICARE

## 2024-09-18 VITALS
BODY MASS INDEX: 25.61 KG/M2 | WEIGHT: 150 LBS | OXYGEN SATURATION: 98 % | HEART RATE: 128 BPM | DIASTOLIC BLOOD PRESSURE: 80 MMHG | SYSTOLIC BLOOD PRESSURE: 130 MMHG | HEIGHT: 64 IN

## 2024-09-18 DIAGNOSIS — I10 ESSENTIAL HYPERTENSION: ICD-10-CM

## 2024-09-18 DIAGNOSIS — R00.0 TACHYCARDIA: ICD-10-CM

## 2024-09-18 DIAGNOSIS — I35.0 NONRHEUMATIC AORTIC VALVE STENOSIS: Primary | ICD-10-CM

## 2024-09-18 DIAGNOSIS — I25.10 CORONARY ARTERY CALCIFICATION SEEN ON CT SCAN: ICD-10-CM

## 2024-09-18 PROCEDURE — G8427 DOCREV CUR MEDS BY ELIG CLIN: HCPCS | Performed by: CLINICAL NURSE SPECIALIST

## 2024-09-18 PROCEDURE — G8417 CALC BMI ABV UP PARAM F/U: HCPCS | Performed by: CLINICAL NURSE SPECIALIST

## 2024-09-18 PROCEDURE — 3079F DIAST BP 80-89 MM HG: CPT | Performed by: CLINICAL NURSE SPECIALIST

## 2024-09-18 PROCEDURE — 1090F PRES/ABSN URINE INCON ASSESS: CPT | Performed by: CLINICAL NURSE SPECIALIST

## 2024-09-18 PROCEDURE — 3075F SYST BP GE 130 - 139MM HG: CPT | Performed by: CLINICAL NURSE SPECIALIST

## 2024-09-18 PROCEDURE — 1036F TOBACCO NON-USER: CPT | Performed by: CLINICAL NURSE SPECIALIST

## 2024-09-18 PROCEDURE — 1123F ACP DISCUSS/DSCN MKR DOCD: CPT | Performed by: CLINICAL NURSE SPECIALIST

## 2024-09-18 PROCEDURE — 99214 OFFICE O/P EST MOD 30 MIN: CPT | Performed by: CLINICAL NURSE SPECIALIST

## 2024-09-18 PROCEDURE — G8399 PT W/DXA RESULTS DOCUMENT: HCPCS | Performed by: CLINICAL NURSE SPECIALIST

## 2024-09-18 RX ORDER — METOPROLOL SUCCINATE 25 MG/1
25 TABLET, EXTENDED RELEASE ORAL NIGHTLY
Qty: 30 TABLET | Refills: 3 | Status: SHIPPED | OUTPATIENT
Start: 2024-09-18

## 2024-09-23 DIAGNOSIS — I10 ESSENTIAL HYPERTENSION: ICD-10-CM

## 2024-09-23 RX ORDER — AMLODIPINE BESYLATE 2.5 MG/1
2.5 TABLET ORAL DAILY
Qty: 30 TABLET | Refills: 3 | Status: SHIPPED | OUTPATIENT
Start: 2024-09-23

## 2024-09-25 RX ORDER — ATORVASTATIN CALCIUM 20 MG/1
20 TABLET, FILM COATED ORAL DAILY
Qty: 90 TABLET | Refills: 1 | Status: SHIPPED | OUTPATIENT
Start: 2024-09-25

## 2024-10-11 DIAGNOSIS — E11.9 TYPE 2 DIABETES MELLITUS WITHOUT COMPLICATION, WITHOUT LONG-TERM CURRENT USE OF INSULIN (HCC): ICD-10-CM

## 2024-10-11 NOTE — TELEPHONE ENCOUNTER
Nina Lepedavid called to request a refill on her medication.      Last office visit : 6/20/2024   Next office visit : Visit date not found     Requested Prescriptions     Pending Prescriptions Disp Refills    metFORMIN (GLUCOPHAGE) 1000 MG tablet [Pharmacy Med Name: METFORMIN HCL 1000 MG TAB 1000 Tablet] 180 tablet 1     Sig: Take 1 tablet by mouth 2 times daily (with meals)            Ileana Theodore LPN

## 2024-10-28 ENCOUNTER — OFFICE VISIT (OUTPATIENT)
Dept: FAMILY MEDICINE CLINIC | Age: 79
End: 2024-10-28
Payer: MEDICARE

## 2024-10-28 VITALS
DIASTOLIC BLOOD PRESSURE: 84 MMHG | BODY MASS INDEX: 25.44 KG/M2 | HEART RATE: 110 BPM | HEIGHT: 64 IN | WEIGHT: 149 LBS | OXYGEN SATURATION: 94 % | TEMPERATURE: 97.9 F | SYSTOLIC BLOOD PRESSURE: 132 MMHG

## 2024-10-28 DIAGNOSIS — Z00.00 MEDICARE ANNUAL WELLNESS VISIT, SUBSEQUENT: Primary | ICD-10-CM

## 2024-10-28 PROCEDURE — G0439 PPPS, SUBSEQ VISIT: HCPCS | Performed by: NURSE PRACTITIONER

## 2024-10-28 PROCEDURE — 1123F ACP DISCUSS/DSCN MKR DOCD: CPT | Performed by: NURSE PRACTITIONER

## 2024-10-28 PROCEDURE — 3079F DIAST BP 80-89 MM HG: CPT | Performed by: NURSE PRACTITIONER

## 2024-10-28 PROCEDURE — 3075F SYST BP GE 130 - 139MM HG: CPT | Performed by: NURSE PRACTITIONER

## 2024-10-28 PROCEDURE — 1159F MED LIST DOCD IN RCRD: CPT | Performed by: NURSE PRACTITIONER

## 2024-10-28 PROCEDURE — G8484 FLU IMMUNIZE NO ADMIN: HCPCS | Performed by: NURSE PRACTITIONER

## 2024-10-28 SDOH — HEALTH STABILITY: PHYSICAL HEALTH: ON AVERAGE, HOW MANY MINUTES DO YOU ENGAGE IN EXERCISE AT THIS LEVEL?: 20 MIN

## 2024-10-28 SDOH — HEALTH STABILITY: PHYSICAL HEALTH: ON AVERAGE, HOW MANY DAYS PER WEEK DO YOU ENGAGE IN MODERATE TO STRENUOUS EXERCISE (LIKE A BRISK WALK)?: 3 DAYS

## 2024-10-28 ASSESSMENT — LIFESTYLE VARIABLES
HOW OFTEN DO YOU HAVE A DRINK CONTAINING ALCOHOL: MONTHLY OR LESS
HOW OFTEN DO YOU HAVE A DRINK CONTAINING ALCOHOL: 2
HOW OFTEN DO YOU HAVE SIX OR MORE DRINKS ON ONE OCCASION: 1
HOW MANY STANDARD DRINKS CONTAINING ALCOHOL DO YOU HAVE ON A TYPICAL DAY: PATIENT DOES NOT DRINK
HOW MANY STANDARD DRINKS CONTAINING ALCOHOL DO YOU HAVE ON A TYPICAL DAY: 0

## 2024-10-28 ASSESSMENT — PATIENT HEALTH QUESTIONNAIRE - PHQ9
1. LITTLE INTEREST OR PLEASURE IN DOING THINGS: NOT AT ALL
SUM OF ALL RESPONSES TO PHQ QUESTIONS 1-9: 0
SUM OF ALL RESPONSES TO PHQ9 QUESTIONS 1 & 2: 0
SUM OF ALL RESPONSES TO PHQ QUESTIONS 1-9: 0
SUM OF ALL RESPONSES TO PHQ QUESTIONS 1-9: 0
2. FEELING DOWN, DEPRESSED OR HOPELESS: NOT AT ALL
SUM OF ALL RESPONSES TO PHQ QUESTIONS 1-9: 0

## 2024-10-28 NOTE — PATIENT INSTRUCTIONS
Eating Healthy Foods: Care Instructions  With every meal, you can make healthy food choices. Try to eat a variety of fruits, vegetables, whole grains, lean proteins, and low-fat dairy products. This can help you get the right balance of nutrients, including vitamins and minerals. Small changes add up over time. You can start by adding one healthy food to your meals each day.    Try to make half your plate fruits and vegetables, one-fourth whole grains, and one-fourth lean proteins. Try including dairy with your meals.   Eat more fruits and vegetables. Try to have them with most meals and snacks.   Foods for healthy eating        Fruits   These can be fresh, frozen, canned, or dried.  Try to choose whole fruit rather than fruit juice.  Eat a variety of colors.        Vegetables   These can be fresh, frozen, canned, or dried.  Beans, peas, and lentils count too.        Whole grains   Choose whole-grain breads, cereals, and noodles.  Try brown rice.        Lean proteins   These can include lean meat, poultry, fish, and eggs.  You can also have tofu, beans, peas, lentils, nuts, and seeds.        Dairy   Try milk, yogurt, and cheese.  Choose low-fat or fat-free when you can.  If you need to, use lactose-free milk or fortified plant-based milk products, such as soy milk.        Water   Drink water when you're thirsty.  Limit sugar-sweetened drinks, including soda, fruit drinks, and sports drinks.  Where can you learn more?  Go to https://www.[employer].net/patientEd and enter T756 to learn more about \"Eating Healthy Foods: Care Instructions.\"  Current as of: September 20, 2023  Content Version: 14.2  © 2024 [employer].   Care instructions adapted under license by [employer]. If you have questions about a medical condition or this instruction, always ask your healthcare professional. [employer] disclaims any warranty or liability for your use of this information.           [employer]

## 2024-10-28 NOTE — PROGRESS NOTES
Medicare Annual Wellness Visit    Nina Elizabeth is here for Medicare AWV    Assessment & Plan   Medicare annual wellness visit, subsequent    Recommendations for Preventive Services Due: see orders and patient instructions/AVS.  Recommended screening schedule for the next 5-10 years is provided to the patient in written form: see Patient Instructions/AVS.     Return in 1 year (on 10/28/2025) for Medicare AWV.     Subjective       Patient's complete Health Risk Assessment and screening values have been reviewed and are found in Flowsheets. The following problems were reviewed today and where indicated follow up appointments were made and/or referrals ordered.    Positive Risk Factor Screenings with Interventions:                 Poor Eating Habits/Diet:  Do you eat balanced/healthy meals regularly?: (!) No  Interventions:  Patient comments: Terrible into the sweets. Eating fresh corn.           Advanced Directives:  Do you have a Living Will?: (!) No    Intervention:  has NO advanced directive - not interested in additional information                     Objective   Vitals:    10/28/24 1500   BP: 132/84   Site: Left Upper Arm   Position: Sitting   Cuff Size: Medium Adult   Pulse: (!) 110   Temp: 97.9 °F (36.6 °C)   TempSrc: Temporal   SpO2: 94%   Weight: 67.6 kg (149 lb)   Height: 1.626 m (5' 4\")      Body mass index is 25.58 kg/m².                No Known Allergies  Prior to Visit Medications    Medication Sig Taking? Authorizing Provider   metFORMIN (GLUCOPHAGE) 1000 MG tablet Take 1 tablet by mouth 2 times daily (with meals) Yes Josefina Ballesteros APRN   atorvastatin (LIPITOR) 20 MG tablet Take 1 tablet by mouth daily Yes Sophia Arellano APRN   amLODIPine (NORVASC) 2.5 MG tablet Take 1 tablet by mouth daily Yes Josefina Ballesteros APRN   metoprolol succinate (TOPROL XL) 25 MG extended release tablet Take 1 tablet by mouth at bedtime Yes Sophia Arellano APRN   glimepiride (AMARYL) 2 MG tablet TAKE 1 TABLET BY MOUTH

## 2024-11-06 ENCOUNTER — OFFICE VISIT (OUTPATIENT)
Dept: GASTROENTEROLOGY | Facility: CLINIC | Age: 79
End: 2024-11-06
Payer: MEDICARE

## 2024-11-06 VITALS
HEIGHT: 64 IN | TEMPERATURE: 97.3 F | WEIGHT: 149 LBS | BODY MASS INDEX: 25.44 KG/M2 | SYSTOLIC BLOOD PRESSURE: 158 MMHG | HEART RATE: 116 BPM | OXYGEN SATURATION: 94 % | DIASTOLIC BLOOD PRESSURE: 88 MMHG

## 2024-11-06 DIAGNOSIS — Z85.038 PERSONAL HISTORY OF COLON CANCER: Primary | ICD-10-CM

## 2024-11-06 DIAGNOSIS — Z80.0 FAMILY HX OF COLON CANCER: ICD-10-CM

## 2024-11-06 RX ORDER — ATORVASTATIN CALCIUM 20 MG/1
20 TABLET, FILM COATED ORAL DAILY
COMMUNITY

## 2024-11-06 RX ORDER — METOPROLOL SUCCINATE 25 MG/1
25 TABLET, EXTENDED RELEASE ORAL DAILY
COMMUNITY

## 2024-11-06 RX ORDER — AMLODIPINE BESYLATE 2.5 MG/1
2.5 TABLET ORAL DAILY
COMMUNITY

## 2024-11-06 NOTE — PROGRESS NOTES
[facility] Gastroenterology    Primary Physician Sera Oseguera APRN    2024    Nasreen Ayala   1945      Chief Complaint   Patient presents with    Colonoscopy       Subjective     HPI    Nasreen Ayala is a 79 y.o. female who presents as a referral for preventative maintenance. She has no complaints of nausea or vomiting. No change in bowels. No wt loss. No BRBPR. No melena. No abdominal pain.         COLONOSCOPY (10/04/2021 10:58) recall 3 years.   Tissue Pathology Exam (10/04/2021 11:10) hyperplastic.   2020 tubular adenomatous colon polyp.     Sister had colon cancer at age 60.        Echo 2024 moderate av stenosis.       Past Medical History:   Diagnosis Date    BCC (basal cell carcinoma of skin) 2023    Left eye's medial canthus    Breast cancer     Right    Colon polyp     Diabetes mellitus     Diverticulosis     Heart murmur     Hemorrhoids     Hyperlipidemia     Hypertension     Lesion of skin of nose 2023    Left medial canthul lesion       Past Surgical History:   Procedure Laterality Date    BREAST BIOPSY       SECTION      COLON SURGERY  2021    COLONOSCOPY  10/29/2014    polyp destroyed, mild diverticulosis    COLONOSCOPY  2008    small internal hemorrhoid, 2 polyps, adenomatous, mild diverticulosis    COLONOSCOPY N/A 2020    Procedure: COLONOSCOPY WITH ANESTHESIA;  Surgeon: Efraín Beck MD;  Location: [facility] ENDOSCOPY;  Service: Gastroenterology;  Laterality: N/A;  pre: hx of polyps  post: polyps. Bx and ink at hepatic flexure polyp site.diverticulosis.   sera oseguera    COLONOSCOPY N/A 10/04/2021    Procedure: COLONOSCOPY WITH ANESTHESIA;  Surgeon: Efraín Beck MD;  Location: [facility] ENDOSCOPY;  Service: Gastroenterology;  Laterality: N/A;  preop; hx of colon cancer  postop; diverticulosis; polyps   PCP Sera Oseguera     EXCISION LESION Left 2023    Excisional Bx Left medial canthul lesion.    HEAD/NECK LESION/CYST  EXCISION Left 09/19/2023    Procedure: EXCISIONAL BIOPSY OF LEFT MEDIAL CANTHUL LESION;  Surgeon: Seth Blackburn MD;  Location:  [facility] OR;  Service: Plastics;  Laterality: Left;    HEAD/NECK LESION/CYST EXCISION Left 11/17/2023    Procedure: RE-EXCISION OF LEFT MEDIAL CANTHUS BASAL CELL CARCINOMA;  Surgeon: Seth Blackburn MD;  Location:  [facility] OR;  Service: Plastics;  Laterality: Left;    MASTECTOMY Right        Outpatient Medications Marked as Taking for the 11/6/24 encounter (Office Visit) with Rhonda Hansen APRN   Medication Sig Dispense Refill    amLODIPine (NORVASC) 2.5 MG tablet Take 1 tablet by mouth Daily.      aspirin 81 MG EC tablet Take 1 tablet by mouth Daily.      atorvastatin (LIPITOR) 20 MG tablet Take 1 tablet by mouth Daily.      furosemide (LASIX) 20 MG tablet Take 1 tablet by mouth 2 (Two) Times a Day.      gabapentin (NEURONTIN) 100 MG capsule Take 10 mg by mouth 2 (Two) Times a Day.      glimepiride (AMARYL) 2 MG tablet Take 0.5 tablets by mouth Every Morning Before Breakfast.      lisinopril (PRINIVIL,ZESTRIL) 5 MG tablet Take 2 tablets by mouth 2 (Two) Times a Day.      metFORMIN (GLUCOPHAGE) 1000 MG tablet TAKE 1 TABLET BY MOUTH TWICE DAILY WITH MEALS      metoprolol succinate XL (TOPROL-XL) 25 MG 24 hr tablet Take 1 tablet by mouth Daily.      raloxifene (EVISTA) 60 MG tablet Take 1 tablet by mouth Daily.      tiZANidine (ZANAFLEX) 4 MG tablet Take 1 tablet by mouth At Night As Needed for Muscle Spasms.         No Known Allergies    Social History     Socioeconomic History    Marital status:    Tobacco Use    Smoking status: Never    Smokeless tobacco: Never   Vaping Use    Vaping status: Never Used   Substance and Sexual Activity    Alcohol use: Yes     Comment: occ    Drug use: Never    Sexual activity: Defer     Partners: Male     Birth control/protection: Post-menopausal       Family History   Problem Relation Age of Onset    Colon cancer Sister         psuedomyxoma     No Known Problems Mother     Diabetes Father     Lung cancer Brother     Colon polyps Neg Hx        Review of Systems   Constitutional:  Negative for chills, fever and unexpected weight change.   Respiratory:  Negative for shortness of breath.    Cardiovascular:  Negative for chest pain.   Gastrointestinal:  Negative for abdominal distention, abdominal pain, anal bleeding, blood in stool, constipation, diarrhea, nausea and vomiting.       Objective     Vitals:    11/06/24 1250   BP: 158/88   Pulse: 116   Temp: 97.3 °F (36.3 °C)   SpO2: 94%         11/06/24  1250   Weight: 67.6 kg (149 lb)     Body mass index is 25.98 kg/m².    Physical Exam  Vitals reviewed.   Constitutional:       General: She is not in acute distress.  Cardiovascular:      Rate and Rhythm: Normal rate and regular rhythm.      Heart sounds: Murmur heard.   Pulmonary:      Effort: Pulmonary effort is normal.      Breath sounds: Normal breath sounds.   Abdominal:      General: Bowel sounds are normal. There is no distension.      Palpations: Abdomen is soft.      Tenderness: There is no abdominal tenderness.   Skin:     General: Skin is warm and dry.   Neurological:      Mental Status: She is alert.         Imaging Results (Most Recent)       None            Assessment & Plan     Diagnoses and all orders for this visit:    1. Personal history of colon cancer (Primary)  Comments:  malignant colon polyp 2020  Orders:  -     Case Request; Standing  -     Case Request    2. Family hx of colon cancer    Other orders  -     Implement Anesthesia Orders Day of Procedure; Standing          Schedule colonoscopy. Miralax prep.                COLONOSCOPY WITH ANESTHESIA (N/A)  All risks, benefits, alternatives, and indications of colonoscopy procedure have been discussed with the patient. Risks to include perforation of the colon requiring possible surgery or colostomy, risk of bleeding from biopsies or removal of colon tissue, possibility of missing a colon  polyp or cancer, or adverse drug reaction.  Benefits to include the diagnosis and management of disease of the colon and rectum. Alternatives to include barium enema, radiographic evaluation, lab testing or no intervention. Pt verbalizes understanding and agrees.     There are no Patient Instructions on file for this visit.    Rhonda Hansen, APRN

## 2024-11-11 ENCOUNTER — TELEPHONE (OUTPATIENT)
Dept: FAMILY MEDICINE CLINIC | Age: 79
End: 2024-11-11

## 2024-11-11 NOTE — TELEPHONE ENCOUNTER
Patient called this morning and has gotten a letter from her insurance stating her records and medications indicate she has either of the 2 conditions. Spacticity or Multiple sclerosis. She states they are requiring 5 years of her medications from her insurance company to prove she does not have these and has not been treated for wither of these.

## 2024-11-13 NOTE — TELEPHONE ENCOUNTER
Patient notified of the need to sign the records release for the 5 years of the medication lists. Patient voiced understanding.

## 2024-12-09 ENCOUNTER — TELEPHONE (OUTPATIENT)
Dept: GASTROENTEROLOGY | Facility: CLINIC | Age: 79
End: 2024-12-09

## 2024-12-09 NOTE — TELEPHONE ENCOUNTER
PT LVM stating she needed to reschedule her procedure.  PT is scheduled for 12-12-24.  PT is sick.  Tried to call her back to get her reschedule.  I LVM

## 2025-01-08 DIAGNOSIS — C50.911 MALIGNANT NEOPLASM OF RIGHT FEMALE BREAST, UNSPECIFIED ESTROGEN RECEPTOR STATUS, UNSPECIFIED SITE OF BREAST (HCC): ICD-10-CM

## 2025-01-08 DIAGNOSIS — E11.9 TYPE 2 DIABETES MELLITUS WITHOUT COMPLICATION, WITHOUT LONG-TERM CURRENT USE OF INSULIN (HCC): ICD-10-CM

## 2025-01-08 DIAGNOSIS — I10 ESSENTIAL HYPERTENSION: ICD-10-CM

## 2025-01-08 NOTE — TELEPHONE ENCOUNTER
Last OV 10/28/2024  Next OV Visit date not found      Requested Prescriptions     Pending Prescriptions Disp Refills    raloxifene (EVISTA) 60 MG tablet [Pharmacy Med Name: RALOXIFENE HCL 60 MG TABS 60 Tablet] 90 tablet 1     Sig: Take 1 tablet by mouth daily    furosemide (LASIX) 20 MG tablet [Pharmacy Med Name: FUROSEMIDE 20 MG TAB 20 Tablet] 90 tablet 1     Sig: Take 1 tablet by mouth daily    metFORMIN (GLUCOPHAGE) 1000 MG tablet [Pharmacy Med Name: METFORMIN HCL 1000 MG TABS 1000 Tablet] 180 tablet 1     Sig: Take 1 tablet by mouth 2 times daily (with meals)

## 2025-01-09 RX ORDER — FUROSEMIDE 20 MG/1
20 TABLET ORAL DAILY
Qty: 90 TABLET | Refills: 1 | Status: SHIPPED | OUTPATIENT
Start: 2025-01-09

## 2025-01-09 RX ORDER — RALOXIFENE HYDROCHLORIDE 60 MG/1
60 TABLET, FILM COATED ORAL DAILY
Qty: 90 TABLET | Refills: 1 | Status: SHIPPED | OUTPATIENT
Start: 2025-01-09

## 2025-01-27 ENCOUNTER — TELEPHONE (OUTPATIENT)
Dept: HEMATOLOGY | Age: 80
End: 2025-01-27

## 2025-01-27 DIAGNOSIS — I10 ESSENTIAL HYPERTENSION: ICD-10-CM

## 2025-01-27 NOTE — TELEPHONE ENCOUNTER
Nina Sherleydavid - 1945- stated that she won't be here for her appointment on 25 and will need to be rescheduled. I sent message to  staff to get this rescheduled.

## 2025-01-28 RX ORDER — METOPROLOL SUCCINATE 25 MG/1
25 TABLET, EXTENDED RELEASE ORAL NIGHTLY
Qty: 30 TABLET | Refills: 3 | Status: SHIPPED | OUTPATIENT
Start: 2025-01-28

## 2025-01-28 RX ORDER — AMLODIPINE BESYLATE 2.5 MG/1
2.5 TABLET ORAL DAILY
Qty: 30 TABLET | Refills: 3 | Status: SHIPPED | OUTPATIENT
Start: 2025-01-28

## 2025-01-28 NOTE — TELEPHONE ENCOUNTER
Nina Lepedavid called to request a refill on her medication.      Last office visit : 10/28/2024   Next office visit : Visit date not found     Requested Prescriptions     Pending Prescriptions Disp Refills    amLODIPine (NORVASC) 2.5 MG tablet [Pharmacy Med Name: AMLODIPINE BESYLATE 2.5 MG 2.5 Tablet] 30 tablet 3     Sig: Take 1 tablet by mouth daily            Ileana Theodore LPN

## 2025-02-04 DIAGNOSIS — M54.30 SCIATIC NERVE PAIN, UNSPECIFIED LATERALITY: ICD-10-CM

## 2025-02-04 DIAGNOSIS — E11.9 TYPE 2 DIABETES MELLITUS WITHOUT COMPLICATION, WITHOUT LONG-TERM CURRENT USE OF INSULIN (HCC): ICD-10-CM

## 2025-02-04 NOTE — TELEPHONE ENCOUNTER
Nina GERMAN  called to request a refill on her medication.      Last office visit : 10/28/2024   Next office visit : Visit date not found     Requested Prescriptions     Pending Prescriptions Disp Refills    metFORMIN (GLUCOPHAGE) 1000 MG tablet 180 tablet 1     Sig: Take 1 tablet by mouth 2 times daily (with meals)    gabapentin (NEURONTIN) 100 MG capsule 180 capsule 1     Sig: Take 1 capsule by mouth 2 times daily for 180 days. Max Daily Amount: 200 mg            Ileana Theodore LPN

## 2025-02-04 NOTE — TELEPHONE ENCOUNTER
Nina Elizabeth called to request a refill on her medication.      Last office visit : 10/28/2024   Next office visit : Visit date not found     Last UDS:   Benzodiazepines,Bld   Date Value Ref Range Status   01/08/2024 Negative Cutoff 50 ng/mL Final       Last Dani: 2/3/2025  Medication Contract: Needs New Med contract   Last Fill: 11/6/2025    Needs UDS as well.    Requested Prescriptions     Pending Prescriptions Disp Refills    gabapentin (NEURONTIN) 100 MG capsule 180 capsule 1     Sig: Take 1 capsule by mouth 2 times daily for 180 days. Max Daily Amount: 200 mg     Signed Prescriptions Disp Refills    metFORMIN (GLUCOPHAGE) 1000 MG tablet 180 tablet 1     Sig: Take 1 tablet by mouth 2 times daily (with meals)     Authorizing Provider: RUSTY CEDILLO     Ordering User: CYNDEE ARRIAGA         Please approve or refuse this medication.   Cyndee Arriaga LPN

## 2025-02-06 RX ORDER — GABAPENTIN 100 MG/1
100 CAPSULE ORAL 2 TIMES DAILY
Qty: 180 CAPSULE | Refills: 1 | Status: SHIPPED | OUTPATIENT
Start: 2025-02-06 | End: 2025-08-05

## 2025-02-10 ENCOUNTER — OFFICE VISIT (OUTPATIENT)
Age: 80
End: 2025-02-10
Payer: MEDICARE

## 2025-02-10 VITALS
SYSTOLIC BLOOD PRESSURE: 118 MMHG | DIASTOLIC BLOOD PRESSURE: 76 MMHG | TEMPERATURE: 97.6 F | OXYGEN SATURATION: 95 % | HEART RATE: 92 BPM | BODY MASS INDEX: 25.1 KG/M2 | HEIGHT: 64 IN | WEIGHT: 147 LBS

## 2025-02-10 DIAGNOSIS — I10 ESSENTIAL HYPERTENSION: ICD-10-CM

## 2025-02-10 DIAGNOSIS — R53.83 OTHER FATIGUE: Primary | ICD-10-CM

## 2025-02-10 DIAGNOSIS — E11.9 TYPE 2 DIABETES MELLITUS WITHOUT COMPLICATION, WITHOUT LONG-TERM CURRENT USE OF INSULIN (HCC): ICD-10-CM

## 2025-02-10 PROCEDURE — 3078F DIAST BP <80 MM HG: CPT | Performed by: NURSE PRACTITIONER

## 2025-02-10 PROCEDURE — G8399 PT W/DXA RESULTS DOCUMENT: HCPCS | Performed by: NURSE PRACTITIONER

## 2025-02-10 PROCEDURE — G8417 CALC BMI ABV UP PARAM F/U: HCPCS | Performed by: NURSE PRACTITIONER

## 2025-02-10 PROCEDURE — 1090F PRES/ABSN URINE INCON ASSESS: CPT | Performed by: NURSE PRACTITIONER

## 2025-02-10 PROCEDURE — 3074F SYST BP LT 130 MM HG: CPT | Performed by: NURSE PRACTITIONER

## 2025-02-10 PROCEDURE — 1159F MED LIST DOCD IN RCRD: CPT | Performed by: NURSE PRACTITIONER

## 2025-02-10 PROCEDURE — 1123F ACP DISCUSS/DSCN MKR DOCD: CPT | Performed by: NURSE PRACTITIONER

## 2025-02-10 PROCEDURE — 99214 OFFICE O/P EST MOD 30 MIN: CPT | Performed by: NURSE PRACTITIONER

## 2025-02-10 PROCEDURE — G8427 DOCREV CUR MEDS BY ELIG CLIN: HCPCS | Performed by: NURSE PRACTITIONER

## 2025-02-10 RX ORDER — DAPAGLIFLOZIN 5 MG/1
5 TABLET, FILM COATED ORAL EVERY MORNING
Qty: 90 TABLET | Refills: 0 | Status: SHIPPED | OUTPATIENT
Start: 2025-02-10

## 2025-02-10 RX ORDER — GLIMEPIRIDE 2 MG/1
TABLET ORAL
Qty: 90 TABLET | Refills: 1 | Status: SHIPPED | OUTPATIENT
Start: 2025-02-10

## 2025-02-10 RX ORDER — METOPROLOL SUCCINATE 25 MG/1
25 TABLET, EXTENDED RELEASE ORAL NIGHTLY
Qty: 90 TABLET | Refills: 1 | Status: SHIPPED | OUTPATIENT
Start: 2025-02-10

## 2025-02-10 RX ORDER — AMLODIPINE BESYLATE 2.5 MG/1
2.5 TABLET ORAL DAILY
Qty: 90 TABLET | Refills: 1 | Status: SHIPPED | OUTPATIENT
Start: 2025-02-10

## 2025-02-10 RX ORDER — LISINOPRIL 10 MG/1
10 TABLET ORAL 2 TIMES DAILY
Qty: 180 TABLET | Refills: 1 | Status: SHIPPED | OUTPATIENT
Start: 2025-02-10

## 2025-02-10 SDOH — ECONOMIC STABILITY: TRANSPORTATION INSECURITY
IN THE PAST 12 MONTHS, HAS THE LACK OF TRANSPORTATION KEPT YOU FROM MEDICAL APPOINTMENTS OR FROM GETTING MEDICATIONS?: NO

## 2025-02-10 SDOH — ECONOMIC STABILITY: FOOD INSECURITY: WITHIN THE PAST 12 MONTHS, THE FOOD YOU BOUGHT JUST DIDN'T LAST AND YOU DIDN'T HAVE MONEY TO GET MORE.: NEVER TRUE

## 2025-02-10 SDOH — ECONOMIC STABILITY: INCOME INSECURITY: IN THE LAST 12 MONTHS, WAS THERE A TIME WHEN YOU WERE NOT ABLE TO PAY THE MORTGAGE OR RENT ON TIME?: NO

## 2025-02-10 SDOH — ECONOMIC STABILITY: FOOD INSECURITY: WITHIN THE PAST 12 MONTHS, YOU WORRIED THAT YOUR FOOD WOULD RUN OUT BEFORE YOU GOT MONEY TO BUY MORE.: NEVER TRUE

## 2025-02-10 SDOH — ECONOMIC STABILITY: TRANSPORTATION INSECURITY
IN THE PAST 12 MONTHS, HAS LACK OF TRANSPORTATION KEPT YOU FROM MEETINGS, WORK, OR FROM GETTING THINGS NEEDED FOR DAILY LIVING?: NO

## 2025-02-10 ASSESSMENT — PATIENT HEALTH QUESTIONNAIRE - PHQ9
SUM OF ALL RESPONSES TO PHQ QUESTIONS 1-9: 0
2. FEELING DOWN, DEPRESSED OR HOPELESS: NOT AT ALL
1. LITTLE INTEREST OR PLEASURE IN DOING THINGS: NOT AT ALL
SUM OF ALL RESPONSES TO PHQ QUESTIONS 1-9: 0
SUM OF ALL RESPONSES TO PHQ9 QUESTIONS 1 & 2: 0
SUM OF ALL RESPONSES TO PHQ9 QUESTIONS 1 & 2: 0
SUM OF ALL RESPONSES TO PHQ QUESTIONS 1-9: 0
1. LITTLE INTEREST OR PLEASURE IN DOING THINGS: NOT AT ALL
SUM OF ALL RESPONSES TO PHQ QUESTIONS 1-9: 0
2. FEELING DOWN, DEPRESSED OR HOPELESS: NOT AT ALL

## 2025-02-10 ASSESSMENT — ENCOUNTER SYMPTOMS
SHORTNESS OF BREATH: 0
COUGH: 0

## 2025-02-10 NOTE — PROGRESS NOTES
SUBJECTIVE:  Check up   Patient ID: Nina GERMAN Lady is a 79 y.o. female.    HPI:   HPI   DM: GLucose running not good due to the holidays getting out of a routine. No low glucose reading. 180-150 not getting into the low 100's   HTN is controlled in the office has added to blood pressure medicines after seeing cardiology Toprol and Norvasc  She would like for me to call these in an 90-day supply  Cholesterol no problems with medication   Has been to see Cardiologist when I asked her what was discussed she said she could not remember so I have reviewed the note from Kylee Arellano.  We discussed symptoms of worsening of her heart valve and tried to help her identify signs     Sees holden for breast cancer sees once a year and is normal   Past Medical History:   Diagnosis Date    Basal cell carcinoma (BCC) in situ of skin     left side of nose on corner of eye    Breast cancer (HCC)     RIGHT SIDE    Cancer (HCC)     breast right breast    Diabetes type 2, controlled (HCC)     Encounter for central line care 06/21/2021    H/O screening mammography 12/06/2018    LEFT BR 2 @     History of therapeutic radiation 2000    Hx antineoplastic chemo 2000    Hypertension     Osteoarthritis     Osteopenia     on Evista      Prior to Visit Medications    Medication Sig Taking? Authorizing Provider   glimepiride (AMARYL) 2 MG tablet TAKE 1 TABLET BY MOUTH ONCE DAILY IN THE MORNING BEFORE BREAKFAST Yes Josefina Ballesteros APRN   lisinopril (PRINIVIL;ZESTRIL) 10 MG tablet Take 1 tablet by mouth 2 times daily Yes Josefina Ballesteros APRN   dapagliflozin (FARXIGA) 5 MG tablet Take 1 tablet by mouth every morning Yes Josefina Ballesteros APRN   metoprolol succinate (TOPROL XL) 25 MG extended release tablet Take 1 tablet by mouth at bedtime Yes Josefina Ballesteros APRN   amLODIPine (NORVASC) 2.5 MG tablet Take 1 tablet by mouth daily Yes Josefina Ballesteros APRN   gabapentin (NEURONTIN) 100 MG capsule Take 1 capsule by mouth 2 times

## 2025-02-24 DIAGNOSIS — R53.83 OTHER FATIGUE: ICD-10-CM

## 2025-02-24 DIAGNOSIS — E11.9 TYPE 2 DIABETES MELLITUS WITHOUT COMPLICATION, WITHOUT LONG-TERM CURRENT USE OF INSULIN (HCC): ICD-10-CM

## 2025-02-24 DIAGNOSIS — I10 ESSENTIAL HYPERTENSION: ICD-10-CM

## 2025-02-24 LAB
ALBUMIN SERPL-MCNC: 4.1 G/DL (ref 3.5–5.2)
ALP SERPL-CCNC: 62 U/L (ref 35–104)
ALT SERPL-CCNC: 13 U/L (ref 5–33)
ANION GAP SERPL CALCULATED.3IONS-SCNC: 16 MMOL/L (ref 8–16)
AST SERPL-CCNC: 19 U/L (ref 5–32)
BILIRUB SERPL-MCNC: 0.5 MG/DL (ref 0.2–1.2)
BUN SERPL-MCNC: 17 MG/DL (ref 8–23)
CALCIUM SERPL-MCNC: 9.3 MG/DL (ref 8.8–10.2)
CHLORIDE SERPL-SCNC: 98 MMOL/L (ref 98–107)
CHOLEST SERPL-MCNC: 114 MG/DL (ref 0–199)
CO2 SERPL-SCNC: 25 MMOL/L (ref 22–29)
CREAT SERPL-MCNC: 0.7 MG/DL (ref 0.5–0.9)
ERYTHROCYTE [DISTWIDTH] IN BLOOD BY AUTOMATED COUNT: 13 % (ref 11.5–14.5)
GLUCOSE SERPL-MCNC: 120 MG/DL (ref 70–99)
HBA1C MFR BLD: 7.8 % (ref 4–5.6)
HCT VFR BLD AUTO: 43.5 % (ref 37–47)
HDLC SERPL-MCNC: 57 MG/DL (ref 40–60)
HGB BLD-MCNC: 13.9 G/DL (ref 12–16)
LDLC SERPL CALC-MCNC: 32 MG/DL
MCH RBC QN AUTO: 33.1 PG (ref 27–31)
MCHC RBC AUTO-ENTMCNC: 32 G/DL (ref 33–37)
MCV RBC AUTO: 103.6 FL (ref 81–99)
PLATELET # BLD AUTO: 267 K/UL (ref 130–400)
PMV BLD AUTO: 9.7 FL (ref 9.4–12.3)
POTASSIUM SERPL-SCNC: 4.4 MMOL/L (ref 3.5–5.1)
PROT SERPL-MCNC: 7.6 G/DL (ref 6.4–8.3)
RBC # BLD AUTO: 4.2 M/UL (ref 4.2–5.4)
SODIUM SERPL-SCNC: 139 MMOL/L (ref 136–145)
T4 FREE SERPL-MCNC: 1.19 NG/DL (ref 0.93–1.7)
TRIGL SERPL-MCNC: 125 MG/DL (ref 0–149)
TSH SERPL DL<=0.005 MIU/L-ACNC: 2.7 UIU/ML (ref 0.27–4.2)
WBC # BLD AUTO: 10.9 K/UL (ref 4.8–10.8)

## 2025-02-25 DIAGNOSIS — E11.9 TYPE 2 DIABETES MELLITUS WITHOUT COMPLICATION, WITHOUT LONG-TERM CURRENT USE OF INSULIN (HCC): Primary | ICD-10-CM

## 2025-02-26 LAB
CREAT UR-MCNC: 142.7 MG/DL (ref 28–217)
MICROALBUMIN UR-MCNC: 1.9 MG/DL (ref 0–1.99)
MICROALBUMIN/CREAT UR-RTO: 13.3 MG/G (ref 0–29)

## 2025-03-12 NOTE — PROGRESS NOTES
Thyroid Nodule Nodule 1.5 CM  Opted not to pursue evaluation        of tubular adenoma, see note    Note: Margins of excision cannot be commented upon due to fragmented nature of the biopsy  2.  . Rectum, polyp, polypectomy - Hyperplastic polyp   Recommendation:  · Refer to colorectal surgery for consideration of surgical management of incompletely resected large hepatic flexure polyp. CT Abd & Pelvis on 12/9/2020 at [facility] in [place] revealed:  · No metastatic disease identified in the abdomen or pelvis. · Multiple hepatic cysts, the largest of which demonstrates wall calcification and may represent a biliary cystadenoma, celiac foregut cyst, or a cyst complicated by prior hemorrhage/inflammation. · Follow-up MRI in 3-6 months is recommended. Alternatively, comparison with prior studies may be helpful if available. 12/17/2020 - Right hemicolectomy at [facility] in [place]  Physician(s):  Garo Martínez M.D., Naresh Miranda M.D., Lisa Greene M.D. Diagnosis:  Leela Quezada, right colon, right colectomy      - Adenocarcinoma with signet ring features, associated with tubular adenoma  - Tumor invades submucosa, pT1  - Perineural invasion present  - Lymphovascular space invasion is not identified  - Margins are negative for tumor  - Appendix with no histopathologic abnormality  - No evidence of malignancy in twenty-two lymph nodes (0/22)    Jim was evaluated by medical oncology at [facility] in [place]. She was told that no further intervention or work-up would be necessary. No adjuvant therapy recommended. This is appropriate. TARGET BREAST CANCER SITES:  1. Right mastectomy post chemo/ radiation in April 1993. TUMOR HISTORY: Inflammatory right breast cancer diagnosed 1/22/1993  Jim originally presented in 1/93 with an engorged, inflamed right breast. She had a history of chronic fibrocystic disease of the breast and initially she thought this was secondary to periodic menstrual engorgement that she had had.      Jim underwent a right breast biopsy on 1/22/93 by Dr. Hortencia Olmedo due to the persistence of the swelling. Pathology revealed a 3.5 X 2.0 X 1.0cm indurated mass with dermal lymphatic involvement of infiltrating breast cancer. She underwent neoadjuvant chemotherapy with FAC. She also underwent neoadjuvant XRT that consisted of 5,040 cGy. She received 3 courses of the chemotherapy therapy during the radiation therapy with a good result. She subsequently underwent a right mastectomy in 4/93. This was an ER/AR negative breast cancer. Following surgery she received 3 more courses of adjuvant FAC totaling 6 courses of FAC. She then went on to receive 6 more courses of CMF chemotherapy. She has continued with regular follow-up clinical visits without evidence of new or recurrent disease. She had a left breast biopsy on 2/24/03 by Dr. Denver Claire that revealed benign fibrocystic disease changes. TREATMENT SUMMARY:  1. FAC x 3.   2. Radiation therapy to the right breast and chest wall area with 5040 cGy. 3. Right mastectomy in April 1993.   4. FAC x 3.   5. CMF x 6. Allergies:  Patient has no known allergies. Medicines:  Current Outpatient Medications   Medication Sig Dispense Refill    furosemide (LASIX) 20 MG tablet Take 1 tablet by mouth daily 90 tablet 1    glimepiride (AMARYL) 2 MG tablet Take 1 tablet by mouth every morning (before breakfast) 90 tablet 1    lisinopril (PRINIVIL;ZESTRIL) 5 MG tablet Take 1 tablet by mouth 2 times daily 180 tablet 1    raloxifene (EVISTA) 60 MG tablet Take 1 tablet by mouth daily 90 tablet 1    metFORMIN (GLUCOPHAGE) 1000 MG tablet TAKE 1 TABLET BY MOUTH TWICE DAILY WITH MEALS 180 tablet 1    blood glucose test strips (ASCENSIA AUTODISC VI;ONE TOUCH ULTRA TEST VI) strip 1 each by In Vitro route daily Daily .  One touch ultra mini E11.9 100 each 3    blood glucose test strips (ONE TOUCH ULTRA TEST) strip USE TO TEST BLOOD SUGAR TWICE DAILY E11.9 100 strip 4  UNABLE TO FIND Test strips insurance preferred  2 times a day FBS and 2 hour after meal  E11.9 100 strip 5    UNABLE TO FIND E11.9  glucomter Device  Insurance preference 1 Device 0    Lancets MISC 2 times a day 100 each 5    aspirin 81 MG tablet Take 81 mg by mouth daily      Ketoprofen  MG CP24 Take 200 mg by mouth as needed       No current facility-administered medications for this visit. Facility-Administered Medications Ordered in Other Visits   Medication Dose Route Frequency Provider Last Rate Last Admin    sodium chloride flush 0.9 % injection 5-40 mL  5-40 mL Intravenous PRN Barbara Prather MD   10 mL at 21 1442    0.9 % sodium chloride infusion  25 mL Intravenous PRN Barbara Prather MD        heparin flush 100 UNIT/ML injection 500 Units  500 Units Intracatheter PRN Barbara Prather MD   500 Units at 21 1442       Past Medical History:      Diagnosis Date    [facility] [address]     Cancer [facility])     breast right breast    Diabetes type 2, controlled ([facility] [address]     Encounter for central line care 2021    H/O screening mammography 2018    LEFT BR 2 @LH     History of therapeutic radiation 2000    Hx antineoplastic chemo     Hypertension     Osteoarthritis     Osteopenia     on [employer]        Past Surgical History:      Procedure Laterality Date    BREAST BIOPSY Left     NEGATIVE PER DR. MARTINS    BREAST SURGERY Right     masectomy      SECTION      COLONOSCOPY  10/29/2014    PER DR. Varsha Jones RECALL 5 YEARS     MASTECTOMY Right 2000    TUBAL LIGATION      TUNNELED VENOUS PORT PLACEMENT          Family History:      Problem Relation Age of Onset    Cancer Sister     Cancer Brother     Cancer Maternal Grandmother         OVARIAN        Social History  Social History     Tobacco Use    Smoking status: Never Smoker    Smokeless tobacco: Never Used   Substance Use Topics    Alcohol use: Yes     Comment: OCCASIONALLY    Drug use:  No REVIEW OF SYSTEMS:    Constitutional: no fever, no night sweats,  fatigue;   HEENT: no blurring of vision, no double vision, no hearing difficulty, no tinnitus,no ulceration, no dysphagia  Lungs: no cough, no shortness of breath, no wheeze;   CVS: no palpitation, no chest pain, no shortness of breath;  GI: no abdominal pain, no nausea , no vomiting, no constipation;   BETZAIDA: no dysuria, frequency and urgency, no hematuria, no kidney stones;   Musculoskeletal: no joint pain, swelling , stiffness;   Endocrine: no polyuria, polydypsia, no cold or heat intolerence; Hematology/lymphatic: no easy brusing or bleeding, no hx of clotting disorder; no peripheral adenopathy. Dermatology: no skin rash, no eczema, no pruritis;   Psychiatry: no depression, no anxiety,no panic attacks, no suicide ideation; Neurology: no syncope, no seizures, no numbness or tingling of hands, no numbness or tingling of feet, no paresis;      PHYSICAL EXAM:    CONSTITUTIONAL: Alert, appropriate, no acute distress,   EYES: Non icteric, EOM intact, pupils equal round and reactive to light and accommodation. ENT: Oral mucus membranes moist, no oral pharyngeal lesions. External inspection of ears and nose are normal.   NECK: Supple, no masses. No palpable thyroid mass    CHEST/LUNGS: CTA bilaterally, normal respiratory effort   CARDIOVASCULAR: RRR, no murmurs. No lower extremity edema   ABDOMEN: soft non-tender, active bowel sounds, no hepatosplenomegaly. No palpable masses. EXTREMITIES: warm, Full ROM of all fours extremities. No focal weakness. SKIN: warm, dry with no rashes or lesions  LYMPH: No cervical, clavicular, axillary, or inguinal lymphadenopathy  NEUROLOGIC: follows commands, non focal.   PSYCH: mood and affect appropriate. Alert and oriented to time and place and person. Labs:  BMP: No results for input(s): NA, K, CL, CO2, PHOS, BUN, CREATININE, CALCIUM in the last 72 hours.   CBC:   Recent Labs     06/21/21  1301 WBC 8.90   HGB 13.6   HCT 43.5   .1*        LIVER PROFILE: No results for input(s): AST, ALT, LIPASE, BILIDIR, BILITOT, ALKPHOS in the last 72 hours. Invalid input(s): AMYLASE,  ALB  PT/INR: No results for input(s): PROTIME, INR in the last 72 hours. APTT: No results for input(s): APTT in the last 72 hours. BNP:  No results for input(s): BNP in the last 72 hours. Ionized Calcium:No results for input(s): IONCA in the last 72 hours. Magnesium:No results for input(s): MG in the last 72 hours. Phosphorus:No results for input(s): PHOS in the last 72 hours. HgbA1C: No results for input(s): LABA1C in the last 72 hours. Hepatic: No results for input(s): ALKPHOS, ALT, AST, PROT, BILITOT, BILIDIR, LABALBU in the last 72 hours. Lactic Acid: No results for input(s): LACTA in the last 72 hours. Troponin: No results for input(s): CKTOTAL, CKMB, TROPONINT in the last 72 hours. ABGs: No results for input(s): PH, PCO2, PO2, HCO3, O2SAT in the last 72 hours. CRP:  No results for input(s): CRP in the last 72 hours. Sed Rate:  No results for input(s): SEDRATE in the last 72 hours. Cultures:   No results for input(s): CULTURE in the last 72 hours. Radiology reports as per the Radiologist  Radiology: [facility] Digital Screen Unilateral Left    Result Date: 6/2/2020  EXAMINATION:  [facility] DIGITAL SCREEN UNILATERAL LEFT  6/2/2020 2:43 PM CLINICAL HISTORY: Screening. Prior right mastectomy. Prior surgical biopsy on the left. No family history of breast cancer. COMPARISON STUDIES: 5/10/2019, 12/6/2018, 5/24/2018, 5/8/2017 and 5/1/2015. BREAST DENSITY: The breasts are heterogeneously dense which may lower the sensitivity of mammography (Pattern C). Digital and 3-D mammography were performed. Computer aided detection was utilized. TECHNIQUE: Digital 2-D and 3-D mammography were performed. Computer aided detection was utilized.  The patient's information has been added to a reminder sytem with a target due date for the next mammogram. FINDINGS: The parenchymal pattern appears stable. There is scarring in the upper outer quadrant of the left breast related to surgical biopsy. This appears stable. There is a HydroMark clip in the medial breast related to prior image guided biopsy. There are no new masses. There are few scattered benign calcifications. There are no suspicious clusters. There is no skin thickening. 1. Benign mammogram. BI-RADS 2. 2. Screening mammography recommended in one year. 3. Computer aided detection and 3-D tomosynthesis were utilized. Signed by Dr Raghu Shin on 6/2/2020 2:52 PM       ASSESSMENT AND PLAN:    Brooke Tinoco is a pleasant 76 y/o  female managed with primary and secondary diagnoses as outlined  · Right sided ER/GA negative inflammatory breast cancer made on 1/22/1993. · Resected right stage I ( pT1, N0 M0) Adenocarcinoma with signet ring features 12/17/2020   · Chronic liver mass abnormality  · Port flush yearly, patient request  · Tumor screening and health maintenance    Brooke Tinoco was treated with neoadjuvant chemotherapy consisting of FAC x 3 followed by Radiation therapy to the right breast and chest wall area. A Right mastectomy in April 1993 was followed by adjuvant chemotherapy with FAC x 3 and CMF x 6. Brooke Tinoco had her screening colonoscopy on 10/1/2020 documenting a large sessile polyp. This eventually led to a right laparoscopic colectomy in [address] [place] [address]. She is here for review of this and follow-up of her previous history of right inflammatory breast cancer. #1  Resected right stage I ( pT1, N0 M0) Adenocarcinoma with signet ring features 12/17/2020     Colonoscopy 10/1/2020 per Dr Lio Chiu at [facility]  Impression:             2. 30 mm hepatic flexure polyp located in between two folds which was proximal to a previously placed tattoo marking.  Incomplete removal with piecemeal EMR as described above, with a portion of the proximal margin of the polyp that was not amenable to chest wall area. A Right mastectomy in April 1993 was followed by adjuvant chemotherapy with FAC x 3 and CMF x 6. She is here for continued follow-up today for examination. Review of systems is unremarkable for new developments. Physical examination today, 6/21/2021 does not reveal evidence of local or regional recurrence in the right chest wall, axilla, supra or interclavicular areas. The left breast, axilla, supra and infraclavicular areas is unremarkable for abnormalities. Specifically in the upper lateral aspect of the left breast. This area is examined in detail without abnormality being felt. Lungs are clear heart is regular abdomen is soft and benign without organomegaly masses or tenderness. Extremities without edema. Neurological exam is grossly intact. Today's exam is completely negative. 2D ECHO on 12/6/2018 had an EF of 5560%. Unilateral left mammogram on 6/16/2021 was BI-RADS 2. CBC today 6/21/2021 reveals a WBC of 8.90. Hgb is 13.6 with an MCV of 105.1 and platelet count of 816,122 . I will see her back in follow-up in 1 year      #3  Breast cancer screening    Unilateral left mammogram on 6/16/2021 was BI-RADS 2. #4  GI cancer screening    Colonoscopy 10/1/2020 per Dr Celso Wells at [address] [facility]  Impression:             3. 30 mm hepatic flexure polyp located in between two folds which was proximal to a previously placed tattoo marking. Incomplete removal with piecemeal EMR as described above, with a portion of the proximal margin of the polyp that was not amenable to endoscopic resection. 2.   One 2 mm polyp in the rectum, removed with a jumbo cold forceps. Resected and retrieved. Pathology:  1.    Colon, hepatic flexure polyp, endoscopic mucosal resection - Fragments of tubular adenoma, see note    Note: Margins of excision cannot be commented upon due to fragmented nature of the biopsy  2.  . Rectum, polyp, polypectomy - Hyperplastic polyp   Recommendation:  · Refer to colorectal surgery for consideration of surgical management of incompletely resected large hepatic flexure polyp. Zeke Blackwell underwent a right hemicolectomy at [facility] in [place] for a   right stage I (pT1, N0 M0) Adenocarcinoma with signet ring features 12/17/2020 by Kevin Rodriguez. Caren Patel M.D.,       #4  Bone health    DEXA scan at [facility] on 11/26/2018 showed osteopenia  She is on calcium and vitamin D    #5  GYN cancer screening    Thane Duane, her PCP monitors her from the GYN standpoint. #6   Chronic liver mass abnormality dating back to at least 1993    An ultrasound of the liver on 7/6/2017 documented a large 10.2 x 8.8 x 8.1 cm septated cystic mass in the liver of uncertain nature with a CAT scan of the liver recommended. A CT scan of the abdomen with IV contrast on 7/10/2017 described a large hypodense left hepatic lobe lesion measuring 9.2 x 8.1 x 8.9 cm in size with a thin septation noted with him it which was partially calcified. No enhancement of the lesion was identified. Other multiple hepatic cysts were also described along with hepatic steatosis. Records from the last 24 years on this patient were reviewed in detail. A CT scan of the abdomen and pelvis from 1/28/1993 was found where the cysts were initially described. An ultrasound of the liver on 2/4/1993 documented the largest cyst measuring 4.8 x 5.7 cm at that time. Zeke Blackwell does not have symptoms in the right upper quadrant. Given all of the above findings listed, this is most consistent with a benign process. With conservative yearly ultrasound monitoring only recommended unless changes are found. The followup ultrasound of the liver on 5/9/2018 revealed stable cysts.      Ultrasound of the liver on 5/6/2019 at [facility] was compared to 5/19/2018 and revealed:  · Fatty infiltration of the liver  · 9 x 9.2 x 8.9 cm large septated cyst   · an adjacent 2.4 x 3.2 x 3.5 cm smaller cyst has increased in size moderately from the previous study when it measured 2.1 x 1.70 1.8 cm. A followup ultrasound will be done next year in May    #7  Port flush    Noam Carpio is due for port flush. She gets this done yearly, declines to have it more frequently or to have the port removed      IObie am scribing for Kayley Ennis MD. Electronically signed by Ave Jovel RN on 6/21/2021 at [facility] was seen today for follow-up. Diagnoses and all orders for this visit:    Malignant neoplasm of right female breast, unspecified estrogen receptor status, unspecified site of breast ([facility] [address]  -     CEA; Future  -     Cancer Antigen 15-3; Future    Liver mass  -     CEA; Future    Encounter for central line care    Health care maintenance    Encounter for screening mammogram for malignant neoplasm of breast  -     Cancel: [facility] DIGITAL SCREEN UNILATERAL LEFT; Future    Post-menopause  -     Cancel: [facility] DIGITAL SCREEN UNILATERAL LEFT; Future        Orders Placed This Encounter   Procedures    CEA     Standing Status:   Future     Standing Expiration Date:   6/21/2022    Cancer Antigen 15-3     Standing Status:   Future     Standing Expiration Date:   6/21/2022       No orders of the defined types were placed in this encounter. No follow-ups on file. I, Dr. Bennie Vela, personally performed the services described in this documentation as scribed by [facility] in my presence, and it is both accurate and complete.

## 2025-03-14 RX ORDER — ATORVASTATIN CALCIUM 20 MG/1
20 TABLET, FILM COATED ORAL DAILY
Qty: 90 TABLET | Refills: 1 | Status: SHIPPED | OUTPATIENT
Start: 2025-03-14

## 2025-04-05 DIAGNOSIS — E11.9 TYPE 2 DIABETES MELLITUS WITHOUT COMPLICATION, WITHOUT LONG-TERM CURRENT USE OF INSULIN: ICD-10-CM

## 2025-04-07 NOTE — TELEPHONE ENCOUNTER
Nina Lepedavid called to request a refill on her medication.      Last office visit : 2/10/2025  Next office visit : Visit date not found     Requested Prescriptions     Pending Prescriptions Disp Refills    metFORMIN (GLUCOPHAGE) 1000 MG tablet [Pharmacy Med Name: METFORMIN HCL 1000 MG TABS 1000 Tablet] 180 tablet 1     Sig: Take 1 tablet by mouth 2 times daily (with meals)            Ileana Theodore LPN

## 2025-05-02 ENCOUNTER — TELEPHONE (OUTPATIENT)
Dept: HEMATOLOGY | Age: 80
End: 2025-05-02

## 2025-05-02 NOTE — TELEPHONE ENCOUNTER
Tried to call Nina to reschedule appointment with Dr. Ramírez in July, since he is out of office . No answer could not leave voicemail     R/S appointment to 8/25/2025 with Dr. Ramírez

## 2025-05-12 DIAGNOSIS — I10 ESSENTIAL HYPERTENSION: ICD-10-CM

## 2025-05-12 DIAGNOSIS — E11.9 TYPE 2 DIABETES MELLITUS WITHOUT COMPLICATION, WITHOUT LONG-TERM CURRENT USE OF INSULIN (HCC): ICD-10-CM

## 2025-05-12 RX ORDER — GLIMEPIRIDE 2 MG/1
TABLET ORAL
Qty: 90 TABLET | Refills: 1 | Status: SHIPPED | OUTPATIENT
Start: 2025-05-12

## 2025-05-12 RX ORDER — LISINOPRIL 10 MG/1
10 TABLET ORAL 2 TIMES DAILY
Qty: 180 TABLET | Refills: 1 | Status: SHIPPED | OUTPATIENT
Start: 2025-05-12

## 2025-05-14 DIAGNOSIS — M54.30 SCIATIC NERVE PAIN, UNSPECIFIED LATERALITY: ICD-10-CM

## 2025-05-14 NOTE — TELEPHONE ENCOUNTER
Nina Elizabeth called to request a refill on her medication.      Last office visit : 2/10/2025   Next office visit : Visit date not found     Last UDS:   Benzodiazepines,Bld   Date Value Ref Range Status   01/08/2024 Negative Cutoff 50 ng/mL Final       Last Dani: 5/14/2025  Medication Contract: NEEDS CONTRACT   Last Fill: 5/14/2025 no refills left after picking up today.       Requested Prescriptions     Pending Prescriptions Disp Refills    gabapentin (NEURONTIN) 100 MG capsule [Pharmacy Med Name: GABAPENTIN 100 MG CAPS 100 Capsule] 180 capsule 1     Sig: Take 1 capsule by mouth 2 times daily for 184 days. Max Daily Amount: 200 mg         Please approve or refuse this medication.   Ileana Theodore LPN

## 2025-05-15 RX ORDER — GABAPENTIN 100 MG/1
100 CAPSULE ORAL 2 TIMES DAILY
Qty: 180 CAPSULE | Refills: 1 | Status: SHIPPED | OUTPATIENT
Start: 2025-05-15 | End: 2025-11-15

## 2025-05-28 RX ORDER — METOPROLOL SUCCINATE 25 MG/1
25 TABLET, EXTENDED RELEASE ORAL NIGHTLY
Qty: 30 TABLET | Refills: 4 | Status: SHIPPED | OUTPATIENT
Start: 2025-05-28

## 2025-06-06 ENCOUNTER — HOSPITAL ENCOUNTER (OUTPATIENT)
Age: 80
Discharge: HOME | End: 2025-06-08
Payer: MEDICARE

## 2025-06-06 VITALS
SYSTOLIC BLOOD PRESSURE: 142 MMHG | BODY MASS INDEX: 27.29 KG/M2 | WEIGHT: 154 LBS | HEIGHT: 63 IN | DIASTOLIC BLOOD PRESSURE: 69 MMHG

## 2025-06-06 DIAGNOSIS — I35.0 NONRHEUMATIC AORTIC VALVE STENOSIS: ICD-10-CM

## 2025-06-06 PROCEDURE — 93306 TTE W/DOPPLER COMPLETE: CPT

## 2025-06-07 LAB
ECHO AO ROOT DIAM: 2.2 CM
ECHO AO ROOT INDEX: 1.27 CM/M2
ECHO AV AREA PEAK VELOCITY: 0.8 CM2
ECHO AV AREA VTI: 0.7 CM2
ECHO AV AREA/BSA PEAK VELOCITY: 0.5 CM2/M2
ECHO AV AREA/BSA VTI: 0.4 CM2/M2
ECHO AV MEAN GRADIENT: 30 MMHG
ECHO AV MEAN VELOCITY: 2.5 M/S
ECHO AV PEAK GRADIENT: 48 MMHG
ECHO AV PEAK VELOCITY: 3.5 M/S
ECHO AV VELOCITY RATIO: 0.26
ECHO AV VTI: 68.2 CM
ECHO BSA: 1.76 M2
ECHO EST RA PRESSURE: 3 MMHG
ECHO IVC PROX: 1.5 CM
ECHO LA AREA 2C: 13.4 CM2
ECHO LA AREA 4C: 13.1 CM2
ECHO LA DIAMETER INDEX: 1.91 CM/M2
ECHO LA DIAMETER: 3.3 CM
ECHO LA MAJOR AXIS: 4.4 CM
ECHO LA MINOR AXIS: 4.2 CM
ECHO LA TO AORTIC ROOT RATIO: 1.5
ECHO LA VOL BP: 34 ML (ref 22–52)
ECHO LA VOL MOD A2C: 35 ML (ref 22–52)
ECHO LA VOL MOD A4C: 31 ML (ref 22–52)
ECHO LA VOL/BSA BIPLANE: 20 ML/M2 (ref 16–34)
ECHO LA VOLUME INDEX MOD A2C: 20 ML/M2 (ref 16–34)
ECHO LA VOLUME INDEX MOD A4C: 18 ML/M2 (ref 16–34)
ECHO LV E' LATERAL VELOCITY: 8.59 CM/S
ECHO LV E' SEPTAL VELOCITY: 5.66 CM/S
ECHO LV EF PHYSICIAN: 60 %
ECHO LV FRACTIONAL SHORTENING: 37 % (ref 28–44)
ECHO LV INTERNAL DIMENSION DIASTOLE INDEX: 2.02 CM/M2
ECHO LV INTERNAL DIMENSION DIASTOLIC: 3.5 CM (ref 3.9–5.3)
ECHO LV INTERNAL DIMENSION SYSTOLIC INDEX: 1.27 CM/M2
ECHO LV INTERNAL DIMENSION SYSTOLIC: 2.2 CM
ECHO LV IVSD: 1.3 CM (ref 0.6–0.9)
ECHO LV MASS 2D: 153.8 G (ref 67–162)
ECHO LV MASS INDEX 2D: 88.9 G/M2 (ref 43–95)
ECHO LV POSTERIOR WALL DIASTOLIC: 1.3 CM (ref 0.6–0.9)
ECHO LV RELATIVE WALL THICKNESS RATIO: 0.74
ECHO LVOT AREA: 3.1 CM2
ECHO LVOT AV VTI INDEX: 0.21
ECHO LVOT DIAM: 2 CM
ECHO LVOT MEAN GRADIENT: 2 MMHG
ECHO LVOT MEAN GRADIENT: 2 MMHG
ECHO LVOT PEAK GRADIENT: 3 MMHG
ECHO LVOT PEAK VELOCITY: 0.9 M/S
ECHO LVOT STROKE VOLUME INDEX: 26.5 ML/M2
ECHO LVOT SV: 45.8 ML
ECHO LVOT VTI: 14.6 CM
ECHO MV A VELOCITY: 1.47 M/S
ECHO MV E DECELERATION TIME (DT): 137 MS
ECHO MV E VELOCITY: 0.91 M/S
ECHO MV E/A RATIO: 0.62
ECHO MV E/E' LATERAL: 10.59
ECHO MV E/E' RATIO (AVERAGED): 13.34
ECHO MV E/E' SEPTAL: 16.08
ECHO RV TAPSE: 1.6 CM (ref 1.7–?)

## 2025-06-07 PROCEDURE — 93306 TTE W/DOPPLER COMPLETE: CPT | Performed by: INTERNAL MEDICINE

## 2025-06-09 ENCOUNTER — RESULTS FOLLOW-UP (OUTPATIENT)
Dept: CARDIOLOGY CLINIC | Age: 80
End: 2025-06-09

## 2025-06-10 RX ORDER — ATORVASTATIN CALCIUM 20 MG/1
20 TABLET, FILM COATED ORAL DAILY
Qty: 90 TABLET | Refills: 1 | Status: SHIPPED | OUTPATIENT
Start: 2025-06-10

## 2025-06-18 ENCOUNTER — OFFICE VISIT (OUTPATIENT)
Dept: CARDIOLOGY CLINIC | Age: 80
End: 2025-06-18
Payer: MEDICARE

## 2025-06-18 VITALS
HEIGHT: 63 IN | HEART RATE: 120 BPM | WEIGHT: 151 LBS | DIASTOLIC BLOOD PRESSURE: 80 MMHG | SYSTOLIC BLOOD PRESSURE: 138 MMHG | BODY MASS INDEX: 26.75 KG/M2

## 2025-06-18 DIAGNOSIS — I35.0 SEVERE AORTIC STENOSIS: ICD-10-CM

## 2025-06-18 DIAGNOSIS — R00.0 TACHYCARDIA: Primary | ICD-10-CM

## 2025-06-18 DIAGNOSIS — I35.0 NONRHEUMATIC AORTIC VALVE STENOSIS: ICD-10-CM

## 2025-06-18 PROCEDURE — 99205 OFFICE O/P NEW HI 60 MIN: CPT | Performed by: INTERNAL MEDICINE

## 2025-06-18 PROCEDURE — 1123F ACP DISCUSS/DSCN MKR DOCD: CPT | Performed by: INTERNAL MEDICINE

## 2025-06-18 PROCEDURE — 93000 ELECTROCARDIOGRAM COMPLETE: CPT | Performed by: INTERNAL MEDICINE

## 2025-06-18 PROCEDURE — 1159F MED LIST DOCD IN RCRD: CPT | Performed by: INTERNAL MEDICINE

## 2025-06-18 PROCEDURE — 3075F SYST BP GE 130 - 139MM HG: CPT | Performed by: INTERNAL MEDICINE

## 2025-06-18 PROCEDURE — 3079F DIAST BP 80-89 MM HG: CPT | Performed by: INTERNAL MEDICINE

## 2025-06-20 NOTE — PROGRESS NOTES
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 The patient reports no symptoms typically associated with aortic stenosis, including shortness of breath, chest pain, lightheadedness, or dizziness.    Despite the lack of reported symptoms, the patient's echocardiogram shows significant narrowing of the aortic valve, now in the severe range. 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 The patient sleeps with 2 small pillows on a flat bed and denies any leg swelling or recent weight gain.    The patient's current medications for cholesterol, diabetes, heart rate control, and high blood pressure are reported to be stable. However, the patient notes that their heart rate is usually around 110-120 beats per minute. The patient denies any difficulty in performing daily activities, including going to various stores without experiencing symptoms.    Medical History:  - Severe aortic stenosis  - Diabetes  - Hyperlipidemia  - Hypertension    Current and Past Medications and Supplements:  - Cholesterol medication  - Diabetes medication  - Heart rate control medication  - High blood pressure medication    Social History:  Occupation: Works as a realtor, 6 days a week  Physical Activity: Able to go to [place], and the mall without reported limitations    Review of Systems:  General: Negative for fatigue  Cardiovascular: Negative for chest pain, lightheadedness, dizziness, leg swelling. Heart rate usually around 110-120 beats per minute.  Respiratory: Negative for shortness of breath

## 2025-06-23 DIAGNOSIS — I35.0 SEVERE AORTIC STENOSIS: Primary | ICD-10-CM

## 2025-06-24 ENCOUNTER — TELEPHONE (OUTPATIENT)
Dept: CARDIOLOGY CLINIC | Age: 80
End: 2025-06-24

## 2025-06-30 ENCOUNTER — HOSPITAL ENCOUNTER (OUTPATIENT)
Dept: WOMENS IMAGING | Age: 80
Discharge: HOME | End: 2025-06-30
Payer: MEDICARE

## 2025-06-30 VITALS — HEIGHT: 64 IN | BODY MASS INDEX: 25.95 KG/M2 | WEIGHT: 152 LBS

## 2025-06-30 DIAGNOSIS — I35.0 AORTIC STENOSIS, SEVERE: Primary | ICD-10-CM

## 2025-06-30 DIAGNOSIS — C50.911 MALIGNANT NEOPLASM OF RIGHT FEMALE BREAST, UNSPECIFIED ESTROGEN RECEPTOR STATUS, UNSPECIFIED SITE OF BREAST (HCC): ICD-10-CM

## 2025-06-30 DIAGNOSIS — Z12.31 ENCOUNTER FOR SCREENING MAMMOGRAM FOR MALIGNANT NEOPLASM OF BREAST: ICD-10-CM

## 2025-06-30 PROCEDURE — 77063 BREAST TOMOSYNTHESIS BI: CPT

## 2025-07-02 DIAGNOSIS — I10 ESSENTIAL HYPERTENSION: ICD-10-CM

## 2025-07-02 DIAGNOSIS — C50.911 MALIGNANT NEOPLASM OF RIGHT FEMALE BREAST, UNSPECIFIED ESTROGEN RECEPTOR STATUS, UNSPECIFIED SITE OF BREAST (HCC): ICD-10-CM

## 2025-07-02 NOTE — TELEPHONE ENCOUNTER
Received fax from pharmacy requesting refill on pts medication(s). Pt was last seen in office on 2/10/2025  and has a follow up scheduled for Visit date not found. Will send request to  Josefina Ballesteros  for authorization.     Requested Prescriptions     Pending Prescriptions Disp Refills    raloxifene (EVISTA) 60 MG tablet [Pharmacy Med Name: RALOXIFENE HCL 60 MG TABS 60 Tablet] 90 tablet 1     Sig: Take 1 tablet by mouth daily    furosemide (LASIX) 20 MG tablet [Pharmacy Med Name: FUROSEMIDE 20 MG TAB 20 Tablet] 90 tablet 1     Sig: Take 1 tablet by mouth daily

## 2025-07-03 RX ORDER — FUROSEMIDE 20 MG/1
20 TABLET ORAL DAILY
Qty: 90 TABLET | Refills: 1 | Status: SHIPPED | OUTPATIENT
Start: 2025-07-03

## 2025-07-03 RX ORDER — RALOXIFENE HYDROCHLORIDE 60 MG/1
60 TABLET, FILM COATED ORAL DAILY
Qty: 90 TABLET | Refills: 1 | Status: SHIPPED | OUTPATIENT
Start: 2025-07-03

## 2025-08-20 ENCOUNTER — TELEPHONE (OUTPATIENT)
Dept: HEMATOLOGY | Age: 80
End: 2025-08-20

## 2025-08-21 RX ORDER — HEPARIN SODIUM (PORCINE) LOCK FLUSH IV SOLN 100 UNIT/ML 100 UNIT/ML
500 SOLUTION INTRAVENOUS PRN
Status: CANCELLED | OUTPATIENT
Start: 2025-08-21

## 2025-08-21 RX ORDER — SODIUM CHLORIDE 0.9 % (FLUSH) 0.9 %
5-40 SYRINGE (ML) INJECTION PRN
Status: CANCELLED | OUTPATIENT
Start: 2025-08-21

## 2025-08-22 DIAGNOSIS — I10 ESSENTIAL HYPERTENSION: ICD-10-CM

## 2025-08-22 DIAGNOSIS — Z85.3 HISTORY OF RIGHT BREAST CANCER: Primary | ICD-10-CM

## 2025-08-22 RX ORDER — AMLODIPINE BESYLATE 2.5 MG/1
2.5 TABLET ORAL DAILY
Qty: 30 TABLET | Refills: 0 | Status: SHIPPED | OUTPATIENT
Start: 2025-08-22

## 2025-08-25 ENCOUNTER — HOSPITAL ENCOUNTER (OUTPATIENT)
Dept: INFUSION THERAPY | Age: 80
Discharge: HOME | End: 2025-08-25
Payer: MEDICARE

## 2025-08-25 ENCOUNTER — OFFICE VISIT (OUTPATIENT)
Dept: HEMATOLOGY | Age: 80
End: 2025-08-25
Payer: MEDICARE

## 2025-08-25 ENCOUNTER — TELEPHONE (OUTPATIENT)
Dept: HEMATOLOGY | Age: 80
End: 2025-08-25

## 2025-08-25 VITALS
BODY MASS INDEX: 25.23 KG/M2 | WEIGHT: 147 LBS | DIASTOLIC BLOOD PRESSURE: 62 MMHG | RESPIRATION RATE: 16 BRPM | OXYGEN SATURATION: 95 % | SYSTOLIC BLOOD PRESSURE: 144 MMHG | TEMPERATURE: 98.1 F | HEART RATE: 99 BPM

## 2025-08-25 DIAGNOSIS — Z45.2 ENCOUNTER FOR CENTRAL LINE CARE: Primary | ICD-10-CM

## 2025-08-25 DIAGNOSIS — Z85.3 HISTORY OF RIGHT BREAST CANCER: Primary | ICD-10-CM

## 2025-08-25 DIAGNOSIS — Z85.3 HISTORY OF RIGHT BREAST CANCER: ICD-10-CM

## 2025-08-25 DIAGNOSIS — Z12.31 ENCOUNTER FOR SCREENING MAMMOGRAM FOR MALIGNANT NEOPLASM OF BREAST: ICD-10-CM

## 2025-08-25 LAB
ALBUMIN SERPL-MCNC: 4 G/DL (ref 3.5–5.2)
ALP SERPL-CCNC: 48 U/L (ref 35–104)
ALT SERPL-CCNC: 12 U/L (ref 5–33)
ANION GAP SERPL CALCULATED.3IONS-SCNC: 16 MMOL/L (ref 7–19)
AST SERPL-CCNC: 23 U/L (ref 5–32)
BASOPHILS # BLD: 0.06 K/UL (ref 0–0.2)
BASOPHILS NFR BLD: 0.9 % (ref 0–1)
BILIRUB SERPL-MCNC: 0.3 MG/DL (ref 0–1.2)
BUN SERPL-MCNC: 14 MG/DL (ref 8–23)
CA 15-3: 20 U/ML (ref 0–25)
CALCIUM SERPL-MCNC: 9 MG/DL (ref 8.8–10.2)
CEA SERPL-MCNC: 1.1 NG/ML (ref 0–4.7)
CHLORIDE SERPL-SCNC: 101 MMOL/L (ref 98–107)
CO2 SERPL-SCNC: 24 MMOL/L (ref 22–29)
CREAT SERPL-MCNC: 0.7 MG/DL (ref 0.5–0.9)
EOSINOPHIL # BLD: 0.19 K/UL (ref 0–0.6)
EOSINOPHIL NFR BLD: 2.8 % (ref 0–5)
ERYTHROCYTE [DISTWIDTH] IN BLOOD BY AUTOMATED COUNT: 12.4 % (ref 11.5–14.5)
GLUCOSE SERPL-MCNC: 118 MG/DL (ref 70–99)
HCT VFR BLD AUTO: 38.2 % (ref 37–47)
HGB BLD-MCNC: 12.8 G/DL (ref 12–16)
LYMPHOCYTES # BLD: 1.79 K/UL (ref 1.1–4.5)
LYMPHOCYTES NFR BLD: 26.5 % (ref 20–40)
MCH RBC QN AUTO: 33.6 PG (ref 27–31)
MCHC RBC AUTO-ENTMCNC: 33.5 G/DL (ref 33–37)
MCV RBC AUTO: 100.3 FL (ref 81–99)
MONOCYTES # BLD: 0.53 K/UL (ref 0–0.9)
MONOCYTES NFR BLD: 7.9 % (ref 1–10)
NEUTROPHILS # BLD: 4.15 K/UL (ref 1.5–7.5)
NEUTS SEG NFR BLD: 61.5 % (ref 50–65)
PLATELET # BLD AUTO: 200 K/UL (ref 130–400)
PMV BLD AUTO: 8.7 FL (ref 9.4–12.3)
POTASSIUM SERPL-SCNC: 4.1 MMOL/L (ref 3.5–5.1)
PROT SERPL-MCNC: 7 G/DL (ref 6.4–8.3)
RBC # BLD AUTO: 3.81 M/UL (ref 4.2–5.4)
SODIUM SERPL-SCNC: 141 MMOL/L (ref 136–145)
WBC # BLD AUTO: 6.75 K/UL (ref 4.8–10.8)

## 2025-08-25 PROCEDURE — 99213 OFFICE O/P EST LOW 20 MIN: CPT

## 2025-08-25 PROCEDURE — 82378 CARCINOEMBRYONIC ANTIGEN: CPT

## 2025-08-25 PROCEDURE — 85025 COMPLETE CBC W/AUTO DIFF WBC: CPT

## 2025-08-25 PROCEDURE — 3078F DIAST BP <80 MM HG: CPT | Performed by: INTERNAL MEDICINE

## 2025-08-25 PROCEDURE — G8399 PT W/DXA RESULTS DOCUMENT: HCPCS | Performed by: INTERNAL MEDICINE

## 2025-08-25 PROCEDURE — 1036F TOBACCO NON-USER: CPT | Performed by: INTERNAL MEDICINE

## 2025-08-25 PROCEDURE — 1123F ACP DISCUSS/DSCN MKR DOCD: CPT | Performed by: INTERNAL MEDICINE

## 2025-08-25 PROCEDURE — 96523 IRRIG DRUG DELIVERY DEVICE: CPT

## 2025-08-25 PROCEDURE — 1090F PRES/ABSN URINE INCON ASSESS: CPT | Performed by: INTERNAL MEDICINE

## 2025-08-25 PROCEDURE — 99214 OFFICE O/P EST MOD 30 MIN: CPT | Performed by: INTERNAL MEDICINE

## 2025-08-25 PROCEDURE — 80053 COMPREHEN METABOLIC PANEL: CPT

## 2025-08-25 PROCEDURE — 36415 COLL VENOUS BLD VENIPUNCTURE: CPT

## 2025-08-25 PROCEDURE — G8417 CALC BMI ABV UP PARAM F/U: HCPCS | Performed by: INTERNAL MEDICINE

## 2025-08-25 PROCEDURE — 1126F AMNT PAIN NOTED NONE PRSNT: CPT | Performed by: INTERNAL MEDICINE

## 2025-08-25 PROCEDURE — G8427 DOCREV CUR MEDS BY ELIG CLIN: HCPCS | Performed by: INTERNAL MEDICINE

## 2025-08-25 PROCEDURE — 6360000002 HC RX W HCPCS: Performed by: INTERNAL MEDICINE

## 2025-08-25 PROCEDURE — 3077F SYST BP >= 140 MM HG: CPT | Performed by: INTERNAL MEDICINE

## 2025-08-25 PROCEDURE — 1159F MED LIST DOCD IN RCRD: CPT | Performed by: INTERNAL MEDICINE

## 2025-08-25 PROCEDURE — 86300 IMMUNOASSAY TUMOR CA 15-3: CPT

## 2025-08-25 PROCEDURE — 2500000003 HC RX 250 WO HCPCS: Performed by: INTERNAL MEDICINE

## 2025-08-25 RX ORDER — HEPARIN 100 UNIT/ML
500 SYRINGE INTRAVENOUS PRN
OUTPATIENT
Start: 2025-08-25

## 2025-08-25 RX ORDER — SODIUM CHLORIDE 0.9 % (FLUSH) 0.9 %
5-40 SYRINGE (ML) INJECTION PRN
Status: DISCONTINUED | OUTPATIENT
Start: 2025-08-25 | End: 2025-08-26 | Stop reason: HOSPADM

## 2025-08-25 RX ORDER — HEPARIN 100 UNIT/ML
500 SYRINGE INTRAVENOUS PRN
Status: DISCONTINUED | OUTPATIENT
Start: 2025-08-25 | End: 2025-08-26 | Stop reason: HOSPADM

## 2025-08-25 RX ORDER — SODIUM CHLORIDE 0.9 % (FLUSH) 0.9 %
5-40 SYRINGE (ML) INJECTION PRN
OUTPATIENT
Start: 2025-08-25

## 2025-08-25 RX ADMIN — SODIUM CHLORIDE, PRESERVATIVE FREE 10 ML: 5 INJECTION INTRAVENOUS at 11:03

## 2025-08-25 RX ADMIN — HEPARIN 500 UNITS: 100 SYRINGE at 11:03

## 2025-08-26 DIAGNOSIS — Z79.899 MEDICATION MANAGEMENT: ICD-10-CM

## 2025-08-26 DIAGNOSIS — E11.9 TYPE 2 DIABETES MELLITUS WITHOUT COMPLICATION, WITHOUT LONG-TERM CURRENT USE OF INSULIN (HCC): Primary | ICD-10-CM

## 2025-08-26 DIAGNOSIS — I10 ESSENTIAL HYPERTENSION: ICD-10-CM

## 2025-08-26 DIAGNOSIS — R53.83 OTHER FATIGUE: ICD-10-CM

## 2025-09-02 ENCOUNTER — OFFICE VISIT (OUTPATIENT)
Age: 80
End: 2025-09-02
Payer: MEDICARE

## 2025-09-02 VITALS
BODY MASS INDEX: 25.1 KG/M2 | SYSTOLIC BLOOD PRESSURE: 124 MMHG | HEART RATE: 104 BPM | WEIGHT: 147 LBS | TEMPERATURE: 98.4 F | HEIGHT: 64 IN | OXYGEN SATURATION: 92 % | DIASTOLIC BLOOD PRESSURE: 76 MMHG

## 2025-09-02 DIAGNOSIS — E11.9 TYPE 2 DIABETES MELLITUS WITHOUT COMPLICATION, WITHOUT LONG-TERM CURRENT USE OF INSULIN (HCC): ICD-10-CM

## 2025-09-02 DIAGNOSIS — Z79.899 MEDICATION MANAGEMENT: Primary | ICD-10-CM

## 2025-09-02 DIAGNOSIS — I10 ESSENTIAL HYPERTENSION: ICD-10-CM

## 2025-09-02 DIAGNOSIS — M54.30 SCIATIC NERVE PAIN, UNSPECIFIED LATERALITY: ICD-10-CM

## 2025-09-02 PROCEDURE — G8399 PT W/DXA RESULTS DOCUMENT: HCPCS | Performed by: NURSE PRACTITIONER

## 2025-09-02 PROCEDURE — G8427 DOCREV CUR MEDS BY ELIG CLIN: HCPCS | Performed by: NURSE PRACTITIONER

## 2025-09-02 PROCEDURE — 1090F PRES/ABSN URINE INCON ASSESS: CPT | Performed by: NURSE PRACTITIONER

## 2025-09-02 PROCEDURE — 99214 OFFICE O/P EST MOD 30 MIN: CPT | Performed by: NURSE PRACTITIONER

## 2025-09-02 PROCEDURE — 3078F DIAST BP <80 MM HG: CPT | Performed by: NURSE PRACTITIONER

## 2025-09-02 PROCEDURE — 3074F SYST BP LT 130 MM HG: CPT | Performed by: NURSE PRACTITIONER

## 2025-09-02 PROCEDURE — G8417 CALC BMI ABV UP PARAM F/U: HCPCS | Performed by: NURSE PRACTITIONER

## 2025-09-02 PROCEDURE — 3051F HG A1C>EQUAL 7.0%<8.0%: CPT | Performed by: NURSE PRACTITIONER

## 2025-09-02 PROCEDURE — 1123F ACP DISCUSS/DSCN MKR DOCD: CPT | Performed by: NURSE PRACTITIONER

## 2025-09-02 PROCEDURE — 80305 DRUG TEST PRSMV DIR OPT OBS: CPT | Performed by: NURSE PRACTITIONER

## 2025-09-02 PROCEDURE — 1159F MED LIST DOCD IN RCRD: CPT | Performed by: NURSE PRACTITIONER

## 2025-09-02 RX ORDER — LISINOPRIL 10 MG/1
10 TABLET ORAL 2 TIMES DAILY
Qty: 180 TABLET | Refills: 1 | Status: SHIPPED | OUTPATIENT
Start: 2025-09-02

## 2025-09-02 RX ORDER — GABAPENTIN 100 MG/1
100 CAPSULE ORAL 2 TIMES DAILY
Qty: 180 CAPSULE | Refills: 1 | Status: SHIPPED | OUTPATIENT
Start: 2025-09-02 | End: 2026-03-05

## 2025-09-02 RX ORDER — AMLODIPINE BESYLATE 2.5 MG/1
2.5 TABLET ORAL DAILY
Qty: 90 TABLET | Refills: 1 | Status: SHIPPED | OUTPATIENT
Start: 2025-09-02

## 2025-09-02 RX ORDER — METOPROLOL SUCCINATE 25 MG/1
25 TABLET, EXTENDED RELEASE ORAL NIGHTLY
Qty: 90 TABLET | Refills: 1 | Status: SHIPPED | OUTPATIENT
Start: 2025-09-02

## 2025-09-02 RX ORDER — ATORVASTATIN CALCIUM 20 MG/1
20 TABLET, FILM COATED ORAL DAILY
Qty: 90 TABLET | Refills: 1 | Status: SHIPPED | OUTPATIENT
Start: 2025-09-02

## 2025-09-02 RX ORDER — GLIMEPIRIDE 2 MG/1
TABLET ORAL
Qty: 90 TABLET | Refills: 1 | Status: SHIPPED | OUTPATIENT
Start: 2025-09-02

## 2025-09-02 ASSESSMENT — ENCOUNTER SYMPTOMS
BACK PAIN: 1
TROUBLE SWALLOWING: 0
VOICE CHANGE: 0
BLOOD IN STOOL: 0
RHINORRHEA: 1
SHORTNESS OF BREATH: 0
ABDOMINAL PAIN: 0
COUGH: 0

## 2025-09-03 LAB
ALCOHOL URINE: NORMAL
AMPHETAMINE SCREEN URINE: NORMAL
BARBITURATE SCREEN URINE: NORMAL
BENZODIAZEPINE SCREEN, URINE: NORMAL
BUPRENORPHINE URINE: NORMAL
COCAINE METABOLITE SCREEN URINE: NORMAL
FENTANYL SCREEN, URINE: NORMAL
GABAPENTIN SCREEN, URINE: NORMAL
MDMA, URINE: NORMAL
METHADONE SCREEN, URINE: NORMAL
METHAMPHETAMINE, URINE: NORMAL
OPIATE SCREEN URINE: NORMAL
OXYCODONE SCREEN URINE: NORMAL
PHENCYCLIDINE SCREEN URINE: NORMAL
PROPOXYPHENE SCREEN, URINE: NORMAL
SYNTHETIC CANNABINOIDS(K2) SCREEN, URINE: NORMAL
THC SCREEN, URINE: NORMAL
TRAMADOL SCREEN URINE: NORMAL
TRICYCLIC ANTIDEPRESSANTS, UR: NORMAL

## (undated) DEVICE — BLD BEAVR MINI GEN SZ15 ORNG

## (undated) DEVICE — BAPTIST TURNOVER KIT: Brand: MEDLINE INDUSTRIES, INC.

## (undated) DEVICE — TBG SMPL FLTR LINE NASL 02/C02 A/ BX/100

## (undated) DEVICE — THE CHANNEL CLEANING BRUSH IS A NYLON FLEXI BRUSH ATTACHED TO A FLEXIBLE PLASTIC SHEATH DESIGNED TO SAFELY REMOVE DEBRIS FROM FLEXIBLE ENDOSCOPES.

## (undated) DEVICE — PREP SOL POVIDONE/IODINE BT 4OZ

## (undated) DEVICE — PK ENT HD AND NK 30

## (undated) DEVICE — GLV SURG BIOGEL LTX PF 8

## (undated) DEVICE — FRCP BX RADJAW4 NDL 2.8 240 STD OG

## (undated) DEVICE — 4-PORT MANIFOLD: Brand: NEPTUNE 2

## (undated) DEVICE — Device: Brand: SPOT EX ENDOSCOPIC TATTOO

## (undated) DEVICE — PROXIMATE RH ROTATING HEAD SKIN STAPLERS (35 WIDE) CONTAINS 35 STAINLESS STEEL STAPLES: Brand: PROXIMATE

## (undated) DEVICE — CABL BIPOL MEGADYNE 12FT DISP

## (undated) DEVICE — CUFF,BP,DISP,1 TUBE,ADULT,HP: Brand: MEDLINE

## (undated) DEVICE — THE SINGLE USE ETRAP – POLYP TRAP IS USED FOR SUCTION RETRIEVAL OF ENDOSCOPICALLY REMOVED POLYPS.: Brand: ETRAP

## (undated) DEVICE — SNAR POLYP SENSATION MICRO OVL 13 240X40

## (undated) DEVICE — SUBMUCOSAL LIFTING AGENT WITH NEEDLE.: Brand: ORISE™ GEL

## (undated) DEVICE — FRCP BIOP ENDO CAPTURAPRO SPK SERR 2.8MM 230CM

## (undated) DEVICE — TRAP FLD MINIVAC MEGADYNE 100ML

## (undated) DEVICE — MASK,OXYGEN,MED CONC,ADLT,7' TUB, UC: Brand: PENDING

## (undated) DEVICE — NON-ADHERENT PAD: Brand: TELFA

## (undated) DEVICE — SENSR O2 OXIMAX FNGR A/ 18IN NONSTR

## (undated) DEVICE — SUT ETHLN 6/0 PC3 18IN 1866G

## (undated) DEVICE — Device: Brand: DEFENDO AIR/WATER/SUCTION AND BIOPSY VALVE

## (undated) DEVICE — YANKAUER,BULB TIP WITH VENT: Brand: ARGYLE

## (undated) DEVICE — NDL SCLEROTHRPY INTERJECT 25G 4 240 CLR

## (undated) DEVICE — MARKR SKIN W/RULR AND LBL